# Patient Record
Sex: FEMALE | Race: BLACK OR AFRICAN AMERICAN | NOT HISPANIC OR LATINO | Employment: OTHER | URBAN - METROPOLITAN AREA
[De-identification: names, ages, dates, MRNs, and addresses within clinical notes are randomized per-mention and may not be internally consistent; named-entity substitution may affect disease eponyms.]

---

## 2019-04-03 ENCOUNTER — OFFICE VISIT (OUTPATIENT)
Dept: INTERNAL MEDICINE | Facility: CLINIC | Age: 69
End: 2019-04-03
Payer: MEDICARE

## 2019-04-03 VITALS
BODY MASS INDEX: 34.16 KG/M2 | RESPIRATION RATE: 18 BRPM | HEIGHT: 69 IN | OXYGEN SATURATION: 99 % | WEIGHT: 230.63 LBS | SYSTOLIC BLOOD PRESSURE: 162 MMHG | DIASTOLIC BLOOD PRESSURE: 100 MMHG | TEMPERATURE: 97 F | HEART RATE: 96 BPM

## 2019-04-03 DIAGNOSIS — Z12.11 ENCOUNTER FOR SCREENING COLONOSCOPY: ICD-10-CM

## 2019-04-03 DIAGNOSIS — Z78.0 POST-MENOPAUSAL: ICD-10-CM

## 2019-04-03 DIAGNOSIS — Z11.59 ENCOUNTER FOR HEPATITIS C SCREENING TEST FOR LOW RISK PATIENT: ICD-10-CM

## 2019-04-03 DIAGNOSIS — I10 ESSENTIAL HYPERTENSION, BENIGN: ICD-10-CM

## 2019-04-03 DIAGNOSIS — Z12.31 SCREENING MAMMOGRAM, ENCOUNTER FOR: ICD-10-CM

## 2019-04-03 DIAGNOSIS — I10 ESSENTIAL HYPERTENSION: ICD-10-CM

## 2019-04-03 DIAGNOSIS — Z00.00 ENCOUNTER FOR MEDICAL EXAMINATION TO ESTABLISH CARE: Primary | ICD-10-CM

## 2019-04-03 PROCEDURE — 99387 INIT PM E/M NEW PAT 65+ YRS: CPT | Mod: S$GLB,,, | Performed by: FAMILY MEDICINE

## 2019-04-03 PROCEDURE — 99999 PR PBB SHADOW E&M-EST. PATIENT-LVL IV: ICD-10-PCS | Mod: PBBFAC,,, | Performed by: FAMILY MEDICINE

## 2019-04-03 PROCEDURE — 99999 PR PBB SHADOW E&M-EST. PATIENT-LVL IV: CPT | Mod: PBBFAC,,, | Performed by: FAMILY MEDICINE

## 2019-04-03 PROCEDURE — 99387 PR PREVENTIVE VISIT,NEW,65 & OVER: ICD-10-PCS | Mod: S$GLB,,, | Performed by: FAMILY MEDICINE

## 2019-04-03 PROCEDURE — 99214 OFFICE O/P EST MOD 30 MIN: CPT | Mod: PBBFAC | Performed by: FAMILY MEDICINE

## 2019-04-03 RX ORDER — LOSARTAN POTASSIUM AND HYDROCHLOROTHIAZIDE 12.5; 5 MG/1; MG/1
1 TABLET ORAL DAILY
Qty: 90 TABLET | Refills: 1 | Status: SHIPPED | OUTPATIENT
Start: 2019-04-03 | End: 2019-04-24 | Stop reason: ALTCHOICE

## 2019-04-03 NOTE — PROGRESS NOTES
Subjective:       Patient ID: Ambar Duenas is a 68 y.o. female.    Chief Complaint: Establish Care    HPI  Ms. Duenas presents today to establish care.     Acid reflux symptoms   Tums help    Sees GYN on the 23rd     Has not taken her blood pressure medication because she has not had it  She has been walking.     Allergic reaction to pneumonia vaccination     Review of Systems   Constitutional: Negative for activity change, appetite change, fatigue and fever.   HENT: Negative for congestion, ear pain and sinus pressure.    Eyes: Negative for pain and visual disturbance.   Respiratory: Negative for cough, chest tightness and wheezing.    Cardiovascular: Negative for chest pain, palpitations and leg swelling.   Gastrointestinal: Negative for abdominal distention, abdominal pain, constipation, diarrhea, nausea and vomiting.   Endocrine: Negative for polydipsia and polyuria.   Genitourinary: Negative for difficulty urinating, dyspareunia, dysuria, flank pain and hematuria.   Musculoskeletal: Negative for arthralgias and back pain.   Skin: Negative for rash.   Neurological: Negative for dizziness, tremors, syncope, weakness, numbness and headaches.   Psychiatric/Behavioral: Negative for agitation and confusion.           Past Medical History:   Diagnosis Date    Fibroids     GERD (gastroesophageal reflux disease)     Hyperlipidemia     Hypertension      Past Surgical History:   Procedure Laterality Date    HEMORRHOID SURGERY       History reviewed. No pertinent family history.  Social History     Socioeconomic History    Marital status:      Spouse name: Not on file    Number of children: Not on file    Years of education: Not on file    Highest education level: Not on file   Occupational History    Not on file   Social Needs    Financial resource strain: Not on file    Food insecurity:     Worry: Not on file     Inability: Not on file    Transportation needs:     Medical: Not on file      Non-medical: Not on file   Tobacco Use    Smoking status: Never Smoker    Smokeless tobacco: Never Used   Substance and Sexual Activity    Alcohol use: Yes     Alcohol/week: 1.2 oz     Types: 2 Glasses of wine per week     Frequency: 2-4 times a month    Drug use: Never    Sexual activity: Not on file   Lifestyle    Physical activity:     Days per week: Not on file     Minutes per session: Not on file    Stress: Not on file   Relationships    Social connections:     Talks on phone: Not on file     Gets together: Not on file     Attends Religion service: Not on file     Active member of club or organization: Not on file     Attends meetings of clubs or organizations: Not on file     Relationship status: Not on file   Other Topics Concern    Not on file   Social History Narrative    Not on file       Objective:        Physical Exam   Constitutional: She is oriented to person, place, and time. She appears well-nourished. No distress.   HENT:   Head: Normocephalic and atraumatic.   Right Ear: External ear normal.   Left Ear: External ear normal.   Nose: Nose normal.   Mouth/Throat: Oropharynx is clear and moist.   Eyes: Pupils are equal, round, and reactive to light. EOM are normal. Right eye exhibits no discharge. Left eye exhibits no discharge.   Neck: Normal range of motion. Neck supple. No thyromegaly present.   Cardiovascular: Normal rate, regular rhythm and normal heart sounds.   No murmur heard.  Pulmonary/Chest: Effort normal and breath sounds normal. No respiratory distress. She has no wheezes.   Abdominal: Soft. Bowel sounds are normal. She exhibits no distension. There is no tenderness.   Musculoskeletal: Normal range of motion. She exhibits no edema.   Neurological: She is alert and oriented to person, place, and time. Coordination normal.   Skin: Skin is warm and dry. No rash noted.   Psychiatric: She has a normal mood and affect. Her behavior is normal.   Nursing note and vitals reviewed.         No results found for this or any previous visit.    Assessment/Plan:     Encounter for medical examination to establish care  -     Comprehensive metabolic panel; Future; Expected date: 04/03/2019  -     CBC auto differential; Future; Expected date: 04/03/2019    Encounter for hepatitis C screening test for low risk patient  -     Hepatitis C antibody; Future; Expected date: 04/03/2019    Screening mammogram, encounter for  -     Mammo Digital Screening Bilateral With CAD; Future; Expected date: 04/03/2019    Post-menopausal  -     DXA Bone Density Spine And Hip; Future; Expected date: 04/03/2019    Essential hypertension-uncontrolled   Hasn't had medication restarted today  NV in 2 weeks   Diet and exercise encouraged I.e low salt low fat diet  -     Lipid panel; Future; Expected date: 04/03/2019    Essential hypertension, benign  -     losartan-hydrochlorothiazide 50-12.5 mg (HYZAAR) 50-12.5 mg per tablet; Take 1 tablet by mouth once daily.  Dispense: 90 tablet; Refill: 1  BP goal is less than 140/90    Encounter for screening colonoscopy  -     Case request GI: COLONOSCOPY        Follow up in about 1 month (around 5/1/2019).    Gin Vitale MD  [facility]   Family Medicine

## 2019-04-23 ENCOUNTER — TELEPHONE (OUTPATIENT)
Dept: OBSTETRICS AND GYNECOLOGY | Facility: CLINIC | Age: 69
End: 2019-04-23

## 2019-04-23 NOTE — TELEPHONE ENCOUNTER
Spoke to patient and she states that she just wants to cancel her appointment that was for today 04/23/19 with Dr. West and will call back to r/s. Call ended.

## 2019-04-23 NOTE — TELEPHONE ENCOUNTER
----- Message from Karime Silva sent at 4/23/2019  2:31 PM CDT -----  Contact: pt  Please call pt @ [phone] regarding appt today, pt states she was schedule for Thursday, but sytem say today, pt need to reschedule.

## 2019-04-24 ENCOUNTER — TELEPHONE (OUTPATIENT)
Dept: INTERNAL MEDICINE | Facility: CLINIC | Age: 69
End: 2019-04-24

## 2019-04-24 ENCOUNTER — TELEPHONE (OUTPATIENT)
Dept: ENDOSCOPY | Facility: HOSPITAL | Age: 69
End: 2019-04-24

## 2019-04-24 DIAGNOSIS — I10 ESSENTIAL HYPERTENSION: Primary | ICD-10-CM

## 2019-04-24 RX ORDER — OLMESARTAN MEDOXOMIL AND HYDROCHLOROTHIAZIDE 20/12.5 20; 12.5 MG/1; MG/1
1 TABLET ORAL DAILY
Qty: 90 TABLET | Refills: 3 | Status: SHIPPED | OUTPATIENT
Start: 2019-04-24 | End: 2019-04-26

## 2019-04-24 NOTE — TELEPHONE ENCOUNTER
----- Message from Gin Vitale MD sent at 4/23/2019  2:33 PM CDT -----  Please schedule a follow up for her BP in 2 weeks if she is able to make that

## 2019-04-24 NOTE — TELEPHONE ENCOUNTER
Pt states the BP medication was making her head hurt and also it on a new list of medication for recall.  Request medication changed to benicar   Apt made for May to come back and follow up on her BP

## 2019-04-24 NOTE — TELEPHONE ENCOUNTER
Pt received [employer] call to schedule her procedure. She stated that she's currently out of town but will call back to schedule at a later date.

## 2019-04-26 ENCOUNTER — TELEPHONE (OUTPATIENT)
Dept: INTERNAL MEDICINE | Facility: CLINIC | Age: 69
End: 2019-04-26

## 2019-04-26 DIAGNOSIS — I10 ESSENTIAL HYPERTENSION: Primary | ICD-10-CM

## 2019-04-26 RX ORDER — OLMESARTAN MEDOXOMIL 40 MG/1
40 TABLET ORAL DAILY
Qty: 90 TABLET | Refills: 3 | Status: SHIPPED | OUTPATIENT
Start: 2019-04-26 | End: 2019-05-09

## 2019-04-26 NOTE — TELEPHONE ENCOUNTER
Pt states she will be out of town and will not be back until 5/20/2019  Advise pt to keep apt already scheduled with Dr. Vitale on 5/22/2019  Pt verbalized understanding

## 2019-04-26 NOTE — TELEPHONE ENCOUNTER
----- Message from Aissatou Loza sent at 4/26/2019  2:28 PM CDT -----  Contact: Patient  Patient called and stated she has an allergic reaction when she takes HCTZ; she would like to know if she can get Benicar instead. Please call her at [phone].    Thanks,  Aissatou

## 2019-04-29 RX ORDER — LOSARTAN POTASSIUM 50 MG/1
TABLET ORAL
Qty: 30 TABLET | Refills: 0 | OUTPATIENT
Start: 2019-04-29

## 2019-05-08 ENCOUNTER — PATIENT OUTREACH (OUTPATIENT)
Dept: ADMINISTRATIVE | Facility: HOSPITAL | Age: 69
End: 2019-05-08

## 2019-05-08 ENCOUNTER — TELEPHONE (OUTPATIENT)
Dept: INTERNAL MEDICINE | Facility: CLINIC | Age: 69
End: 2019-05-08

## 2019-05-08 NOTE — TELEPHONE ENCOUNTER
----- Message from Malou Palacios sent at 5/8/2019  9:39 AM CDT -----  Contact: self  Pt is calling in regards to blood pressure medication. Pt states medication was to be changed due to pharmacy not able to get pt's medication. Pt is needing blood pressure medication. Pharmacy requesting 2 other blood pressure medications for pt, but is waiting on doctor's office. Please call pt back at [phone].    Pt uses:    [facility] [place] [address] [address] [facility] [place] [address] [facility] [address]  Phone: [phone] Fax: [phone]    Thanks,   Malou Palacios

## 2019-05-08 NOTE — TELEPHONE ENCOUNTER
Pt states Benicar is on back order, requesting one of these medications sent to the pharmacy for her.  Candesartan  telmisartan

## 2019-05-09 RX ORDER — TELMISARTAN 40 MG/1
40 TABLET ORAL DAILY
Qty: 90 TABLET | Refills: 1 | Status: SHIPPED | OUTPATIENT
Start: 2019-05-09 | End: 2020-01-29 | Stop reason: ALTCHOICE

## 2019-06-03 ENCOUNTER — OFFICE VISIT (OUTPATIENT)
Dept: INTERNAL MEDICINE | Facility: CLINIC | Age: 69
End: 2019-06-03
Payer: MEDICARE

## 2019-06-03 VITALS
OXYGEN SATURATION: 98 % | WEIGHT: 224.63 LBS | HEART RATE: 90 BPM | RESPIRATION RATE: 18 BRPM | SYSTOLIC BLOOD PRESSURE: 148 MMHG | HEIGHT: 69 IN | BODY MASS INDEX: 33.27 KG/M2 | TEMPERATURE: 98 F | DIASTOLIC BLOOD PRESSURE: 102 MMHG

## 2019-06-03 DIAGNOSIS — J01.40 ACUTE PANSINUSITIS, RECURRENCE NOT SPECIFIED: ICD-10-CM

## 2019-06-03 DIAGNOSIS — Z12.11 COLON CANCER SCREENING: ICD-10-CM

## 2019-06-03 DIAGNOSIS — I10 ESSENTIAL HYPERTENSION: Primary | ICD-10-CM

## 2019-06-03 DIAGNOSIS — R09.82 ALLERGIC RHINITIS WITH POSTNASAL DRIP: ICD-10-CM

## 2019-06-03 DIAGNOSIS — J30.9 ALLERGIC RHINITIS WITH POSTNASAL DRIP: ICD-10-CM

## 2019-06-03 PROCEDURE — 3077F PR MOST RECENT SYSTOLIC BLOOD PRESSURE >= 140 MM HG: ICD-10-PCS | Mod: CPTII,S$GLB,, | Performed by: FAMILY MEDICINE

## 2019-06-03 PROCEDURE — 99214 OFFICE O/P EST MOD 30 MIN: CPT | Mod: S$GLB,,, | Performed by: FAMILY MEDICINE

## 2019-06-03 PROCEDURE — 3080F PR MOST RECENT DIASTOLIC BLOOD PRESSURE >= 90 MM HG: ICD-10-PCS | Mod: CPTII,S$GLB,, | Performed by: FAMILY MEDICINE

## 2019-06-03 PROCEDURE — 3077F SYST BP >= 140 MM HG: CPT | Mod: CPTII,S$GLB,, | Performed by: FAMILY MEDICINE

## 2019-06-03 PROCEDURE — 1101F PR PT FALLS ASSESS DOC 0-1 FALLS W/OUT INJ PAST YR: ICD-10-PCS | Mod: CPTII,S$GLB,, | Performed by: FAMILY MEDICINE

## 2019-06-03 PROCEDURE — 99214 PR OFFICE/OUTPT VISIT, EST, LEVL IV, 30-39 MIN: ICD-10-PCS | Mod: S$GLB,,, | Performed by: FAMILY MEDICINE

## 2019-06-03 PROCEDURE — 3080F DIAST BP >= 90 MM HG: CPT | Mod: CPTII,S$GLB,, | Performed by: FAMILY MEDICINE

## 2019-06-03 PROCEDURE — 99999 PR PBB SHADOW E&M-EST. PATIENT-LVL III: ICD-10-PCS | Mod: PBBFAC,,, | Performed by: FAMILY MEDICINE

## 2019-06-03 PROCEDURE — 1101F PT FALLS ASSESS-DOCD LE1/YR: CPT | Mod: CPTII,S$GLB,, | Performed by: FAMILY MEDICINE

## 2019-06-03 PROCEDURE — 99999 PR PBB SHADOW E&M-EST. PATIENT-LVL III: CPT | Mod: PBBFAC,,, | Performed by: FAMILY MEDICINE

## 2019-06-03 RX ORDER — LEVOCETIRIZINE DIHYDROCHLORIDE 5 MG/1
5 TABLET, FILM COATED ORAL NIGHTLY
Qty: 10 TABLET | Refills: 0 | Status: SHIPPED | OUTPATIENT
Start: 2019-06-03 | End: 2021-03-11 | Stop reason: SDUPTHER

## 2019-06-03 RX ORDER — AMOXICILLIN 875 MG/1
875 TABLET, FILM COATED ORAL 2 TIMES DAILY
Qty: 14 TABLET | Refills: 0 | Status: SHIPPED | OUTPATIENT
Start: 2019-06-03 | End: 2019-06-10

## 2019-06-03 NOTE — PROGRESS NOTES
Ambar Duenas  68 y.o. Black or  female    Here for follow up on hypertension.    At home she has 137/86  130's/90 yesterday  Just got blood pressure medication for about 2 weeks.     She drove from [place] today.    Went to  for headaches (2.5) weeks ago.   She was having sinus pressure   She was told she had a sinus infection.     Walk for exercise  Changing diet    Reports taking medications as instructed.  Not taking any OTC meds.    Past Medical History:   Diagnosis Date    Fibroids     GERD (gastroesophageal reflux disease)     Hyperlipidemia     Hypertension        Current Outpatient Medications:     telmisartan (MICARDIS) 40 MG Tab, Take 1 tablet (40 mg total) by mouth once daily., Disp: 90 tablet, Rfl: 1    amoxicillin (AMOXIL) 875 MG tablet, Take 1 tablet (875 mg total) by mouth 2 (two) times daily. for 7 days, Disp: 14 tablet, Rfl: 0    levocetirizine (XYZAL) 5 MG tablet, Take 1 tablet (5 mg total) by mouth every evening., Disp: 10 tablet, Rfl: 0    Review of patient's allergies indicates:  No Known Allergies    ROS: Denies dizziness, headache, chest pain, shortness of breath or edema    PE:  GEN: Alert and oriented, no acute distress  HEENT: Maxillary sinus tenderness on palpation, mucosal edema  HEART: Normal S1 and S2, RRR, no lower extremity edema  LUNGS: Respirations unlabored, bilaterally clear to auscultation    ASSESSMENT/PLAN:  Essential hypertension-uncontrolled   Informed patient of digital HTN program and she declined.   She has been exercising and does not consume extra salt.     Feels she has elevated pressures today b/c she has a sinus infection she has been dealing with.     Acute pansinusitis, recurrence not specified  -     amoxicillin (AMOXIL) 875 MG tablet; Take 1 tablet (875 mg total) by mouth 2 (two) times daily. for 7 days  Dispense: 14 tablet; Refill: 0  She did not get antibiotics when it was offered at  a week or so ago.   Treating today.      Allergic rhinitis with postnasal drip  -     levocetirizine (XYZAL) 5 MG tablet; Take 1 tablet (5 mg total) by mouth every evening.  Dispense: 10 tablet; Refill: 0    Colon cancer screening  -     Case request GI: COLONOSCOPY      Follow up 2 weeks

## 2019-06-05 ENCOUNTER — TELEPHONE (OUTPATIENT)
Dept: ENDOSCOPY | Facility: HOSPITAL | Age: 69
End: 2019-06-05

## 2019-06-05 NOTE — TELEPHONE ENCOUNTER
"Calling patient in regards to scheduling endoscopy procedure . Unable to leave voice message, "voicemail full".  "

## 2019-06-12 DIAGNOSIS — R53.83 OTHER FATIGUE: Primary | ICD-10-CM

## 2019-08-20 ENCOUNTER — TELEPHONE (OUTPATIENT)
Dept: INTERNAL MEDICINE | Facility: CLINIC | Age: 69
End: 2019-08-20

## 2019-09-05 ENCOUNTER — PATIENT OUTREACH (OUTPATIENT)
Dept: ADMINISTRATIVE | Facility: HOSPITAL | Age: 69
End: 2019-09-05

## 2019-09-05 NOTE — PROGRESS NOTES
Contacted patient to follow up on overdue Colonoscopy. Mailbox is full, unable to leave a message

## 2019-09-27 ENCOUNTER — PATIENT OUTREACH (OUTPATIENT)
Dept: ADMINISTRATIVE | Facility: HOSPITAL | Age: 69
End: 2019-09-27

## 2019-09-27 NOTE — PROGRESS NOTES
I have attempted without success to contact this patient re Mammogram. No answer, unable to lvm.

## 2019-11-01 ENCOUNTER — TELEPHONE (OUTPATIENT)
Dept: ENDOSCOPY | Facility: HOSPITAL | Age: 69
End: 2019-11-01

## 2019-12-04 ENCOUNTER — TELEPHONE (OUTPATIENT)
Dept: INTERNAL MEDICINE | Facility: CLINIC | Age: 69
End: 2019-12-04

## 2019-12-04 NOTE — TELEPHONE ENCOUNTER
----- Message from Olman Mello sent at 12/4/2019 12:56 PM CST -----  Contact: PT   States she's calling rg being 20 -25 min late and can be reached at [phone] and pt states she may show up //thanks/dbw

## 2020-01-29 ENCOUNTER — TELEPHONE (OUTPATIENT)
Dept: ENDOSCOPY | Facility: HOSPITAL | Age: 70
End: 2020-01-29

## 2020-01-29 ENCOUNTER — OFFICE VISIT (OUTPATIENT)
Dept: INTERNAL MEDICINE | Facility: CLINIC | Age: 70
End: 2020-01-29
Payer: MEDICARE

## 2020-01-29 VITALS
WEIGHT: 236.31 LBS | DIASTOLIC BLOOD PRESSURE: 96 MMHG | SYSTOLIC BLOOD PRESSURE: 158 MMHG | OXYGEN SATURATION: 99 % | TEMPERATURE: 98 F | HEART RATE: 105 BPM | BODY MASS INDEX: 34.9 KG/M2

## 2020-01-29 DIAGNOSIS — I10 ESSENTIAL HYPERTENSION: ICD-10-CM

## 2020-01-29 DIAGNOSIS — Z12.11 COLON CANCER SCREENING: ICD-10-CM

## 2020-01-29 DIAGNOSIS — Z00.00 ROUTINE PHYSICAL EXAMINATION: Primary | ICD-10-CM

## 2020-01-29 DIAGNOSIS — M62.838 MUSCLE SPASM: ICD-10-CM

## 2020-01-29 DIAGNOSIS — N95.0 POSTMENOPAUSAL BLEEDING: ICD-10-CM

## 2020-01-29 PROCEDURE — 3080F PR MOST RECENT DIASTOLIC BLOOD PRESSURE >= 90 MM HG: ICD-10-PCS | Mod: CPTII,S$GLB,, | Performed by: FAMILY MEDICINE

## 2020-01-29 PROCEDURE — 3077F PR MOST RECENT SYSTOLIC BLOOD PRESSURE >= 140 MM HG: ICD-10-PCS | Mod: CPTII,S$GLB,, | Performed by: FAMILY MEDICINE

## 2020-01-29 PROCEDURE — 3080F DIAST BP >= 90 MM HG: CPT | Mod: CPTII,S$GLB,, | Performed by: FAMILY MEDICINE

## 2020-01-29 PROCEDURE — 99999 PR PBB SHADOW E&M-EST. PATIENT-LVL III: ICD-10-PCS | Mod: PBBFAC,,, | Performed by: FAMILY MEDICINE

## 2020-01-29 PROCEDURE — 99397 PR PREVENTIVE VISIT,EST,65 & OVER: ICD-10-PCS | Mod: S$GLB,,, | Performed by: FAMILY MEDICINE

## 2020-01-29 PROCEDURE — 99397 PER PM REEVAL EST PAT 65+ YR: CPT | Mod: S$GLB,,, | Performed by: FAMILY MEDICINE

## 2020-01-29 PROCEDURE — 99999 PR PBB SHADOW E&M-EST. PATIENT-LVL III: CPT | Mod: PBBFAC,,, | Performed by: FAMILY MEDICINE

## 2020-01-29 PROCEDURE — 3077F SYST BP >= 140 MM HG: CPT | Mod: CPTII,S$GLB,, | Performed by: FAMILY MEDICINE

## 2020-01-29 RX ORDER — AMLODIPINE BESYLATE 5 MG/1
5 TABLET ORAL DAILY
Qty: 90 TABLET | Refills: 0 | Status: SHIPPED | OUTPATIENT
Start: 2020-01-29 | End: 2020-05-04 | Stop reason: SDUPTHER

## 2020-01-29 RX ORDER — METHOCARBAMOL 500 MG/1
500 TABLET, FILM COATED ORAL 4 TIMES DAILY
Qty: 40 TABLET | Refills: 0 | Status: SHIPPED | OUTPATIENT
Start: 2020-01-29 | End: 2020-02-08

## 2020-01-29 NOTE — PROGRESS NOTES
Ambar Duenas  01/30/2020  [id number]    Gin Vitale MD  Patient Care Team:  Gin Vitale MD as PCP - General (Internal Medicine)    Has the patient seen any provider outside of the network since the last visit ? (no). If yes, HIPPA forms completed and records requested.      Visit Type:a scheduled routine follow-up visit    Chief Complaint:  No chief complaint on file.      History of Present Illness:  HPI Ms. Duenas presents today for her routine annual exam. She has not had a change in her medical history since her last visit.     She has been working out and muscles are sore.   Started with a  about a month ago.     Review of Systems   Constitutional: Negative for chills and fever.   HENT: Negative for congestion and tinnitus.    Eyes: Negative for blurred vision, pain and discharge.   Respiratory: Negative for cough and wheezing.    Cardiovascular: Negative for chest pain, palpitations, orthopnea and leg swelling.   Gastrointestinal: Negative for abdominal pain, blood in stool, constipation, diarrhea, heartburn, nausea and vomiting.   Genitourinary: Negative for dysuria, flank pain, frequency, hematuria and urgency.   Musculoskeletal: Positive for joint pain and myalgias.   Skin: Negative for itching and rash.   Neurological: Negative for dizziness, tingling and headaches.   Psychiatric/Behavioral: Negative for depression.         Screening Questionnaires:    In the last two weeks how often have you felt down, depressed, or hopeless ( no )    In the last two weeks how often have you had little interest or pleasure in doing  (no )    In the last two weeks how often have you been bothered by the following problems:  1. Feeling nervous, anxious, or on edge ( no )    2. Not being able to stop or control worrying ( no)    3. Worrying too much about different things ( no)    4. Trouble relaxing ( no )    5. Being so restless that it is hard to sit still  (no )    6. Becoming easily annoyed or  irritable (no)    7. Feeling afraid as if something awful might happen (no )    How often do you have a drink containing Alcohol? denied     Do you exercise  (yes ) moderately active    Do you take a baby Aspirin daily ( no)    Do you have an advance directive ( no ) The patient was given information regarding Living Will/Durable Power-of- if requested.     The following were reviewed: Active problem list, medication list, allergies, family history, social history, and Health Maintenance.     History:  Past Medical History:   Diagnosis Date    Fibroids     GERD (gastroesophageal reflux disease)     Hyperlipidemia     Hypertension      Past Surgical History:   Procedure Laterality Date    HEMORRHOID SURGERY       History reviewed. No pertinent family history.  Social History     Socioeconomic History    Marital status:      Spouse name: Not on file    Number of children: Not on file    Years of education: Not on file    Highest education level: Not on file   Occupational History    Not on file   Social Needs    Financial resource strain: Not on file    Food insecurity:     Worry: Not on file     Inability: Not on file    Transportation needs:     Medical: Not on file     Non-medical: Not on file   Tobacco Use    Smoking status: Never Smoker    Smokeless tobacco: Never Used   Substance and Sexual Activity    Alcohol use: Yes     Alcohol/week: 2.0 standard drinks     Types: 2 Glasses of wine per week     Frequency: 2-4 times a month    Drug use: Never    Sexual activity: Not on file   Lifestyle    Physical activity:     Days per week: Not on file     Minutes per session: Not on file    Stress: Not on file   Relationships    Social connections:     Talks on phone: Not on file     Gets together: Not on file     Attends Zoroastrian service: Not on file     Active member of club or organization: Not on file     Attends meetings of clubs or organizations: Not on file     Relationship  status: Not on file   Other Topics Concern    Not on file   Social History Narrative    Not on file     There is no problem list on file for this patient.    Review of patient's allergies indicates:  No Known Allergies    Health Maintenance  The patient has no Health Maintenance topics of status Not Due  Health Maintenance Due   Topic Date Due    Hepatitis C Screening  1950    Lipid Panel  1950    TETANUS VACCINE  08/18/1970    Mammogram  08/18/1992    DEXA SCAN  08/18/1992    Shingles Vaccine (1 of 2) 08/18/2002    Colonoscopy  08/18/2002    Influenza Vaccine (1) 09/01/2019       Medications:  Current Outpatient Medications on File Prior to Visit   Medication Sig Dispense Refill    levocetirizine (XYZAL) 5 MG tablet Take 1 tablet (5 mg total) by mouth every evening. 10 tablet 0     No current facility-administered medications on file prior to visit.        Medications have been reviewed and reconciled with patient at visit today.    Barriers to medications present (no )    Adverse reactions to current medications (no)    Over the counter medications reviewed (Yes) and if needed added to active Medication list.    Exam:  Vitals:    01/29/20 1453   BP: (!) 158/96   Pulse: 105   Temp: 97.7 °F (36.5 °C)     Weight: 107.2 kg (236 lb 5.3 oz)   Body mass index is 34.9 kg/m².      Physical Exam    Laboratory Reviewed: (N/A)  No results found for: WBC, HGB, HCT, PLT, CHOL, TRIG, HDL, LDLDIRECT, ALT, AST, NA, K, CL, CREATININE, BUN, CO2, TSH, PSA, INR, GLUF, HGBA1C, MICROALBUR    Assessment:  The primary encounter diagnosis was Routine physical examination. Diagnoses of BMI 34.0-34.9,adult, Postmenopausal bleeding, Colon cancer screening, Muscle spasm, and Essential hypertension were also pertinent to this visit.    Plan:  Routine physical examination  -     CBC auto differential; Future; Expected date: 01/29/2020  Reviewed medical, social, surgical and family history. Reviewed health maintenance.      BMI 34.0-34.9,adult  -     CBC auto differential; Future; Expected date: 01/29/2020    Postmenopausal bleeding  -     Ambulatory consult to Obstetrics / Gynecology    Colon cancer screening  -     Case request GI: COLONOSCOPY    Muscle spasm  -     methocarbamol (ROBAXIN) 500 MG Tab; Take 1 tablet (500 mg total) by mouth 4 (four) times daily. for 10 days  Dispense: 40 tablet; Refill: 0    Essential hypertension-controlled  -     amLODIPine (NORVASC) 5 MG tablet; Take 1 tablet (5 mg total) by mouth once daily.  Dispense: 90 tablet; Refill: 0        -Patient's lab results were reviewed and discussed with patient  -Treatment options and alternatives were discussed with the patient. Patient expressed understanding. Patient was given the opportunity to ask questions and be an active participant in their medical care. Patient had no further questions or concerns at this time.   -Documentation of patient's health and condition was obtained from family member who was present during visit.  -Patient is an overall moderate risk for health complications from their medical conditions.     Follow up: Follow up in about 1 year (around 1/29/2021), or if symptoms worsen or fail to improve.      Care Plan/Goals: Reviewed N/A  Goals    None             After visit summary printed and given to patient upon discharge.  Patient goals and care plan are included in After visit summary.

## 2020-03-05 ENCOUNTER — PATIENT OUTREACH (OUTPATIENT)
Dept: ADMINISTRATIVE | Facility: OTHER | Age: 70
End: 2020-03-05

## 2020-05-04 DIAGNOSIS — I10 ESSENTIAL HYPERTENSION: ICD-10-CM

## 2020-05-04 RX ORDER — AMLODIPINE BESYLATE 5 MG/1
TABLET ORAL
Qty: 90 TABLET | Refills: 0 | Status: SHIPPED | OUTPATIENT
Start: 2020-05-04 | End: 2020-07-31 | Stop reason: SDUPTHER

## 2020-06-12 ENCOUNTER — PATIENT OUTREACH (OUTPATIENT)
Dept: ADMINISTRATIVE | Facility: HOSPITAL | Age: 70
End: 2020-06-12

## 2020-06-25 ENCOUNTER — PATIENT OUTREACH (OUTPATIENT)
Dept: ADMINISTRATIVE | Facility: HOSPITAL | Age: 70
End: 2020-06-25

## 2020-06-25 NOTE — PROGRESS NOTES
Contacted patient to follow up on overdue Colonoscopy. Patient states she is currently in [place] with her sister and wont be back for two weeks and will call back

## 2020-07-31 ENCOUNTER — PATIENT OUTREACH (OUTPATIENT)
Dept: ADMINISTRATIVE | Facility: HOSPITAL | Age: 70
End: 2020-07-31

## 2020-07-31 ENCOUNTER — TELEPHONE (OUTPATIENT)
Dept: ADMINISTRATIVE | Facility: HOSPITAL | Age: 70
End: 2020-07-31

## 2020-07-31 DIAGNOSIS — I10 ESSENTIAL HYPERTENSION: ICD-10-CM

## 2020-07-31 DIAGNOSIS — Z12.31 ENCOUNTER FOR SCREENING MAMMOGRAM FOR BREAST CANCER: Primary | ICD-10-CM

## 2020-07-31 RX ORDER — AMLODIPINE BESYLATE 5 MG/1
5 TABLET ORAL DAILY
Qty: 90 TABLET | Refills: 0 | Status: SHIPPED | OUTPATIENT
Start: 2020-07-31 | End: 2021-03-11 | Stop reason: SDUPTHER

## 2020-07-31 NOTE — TELEPHONE ENCOUNTER
Working Dr. Vitale Mammo overdue. Spoke with pt she will callback to schedule annual, mammo and dex scan at later time. Her sister is ill right now.

## 2020-07-31 NOTE — TELEPHONE ENCOUNTER
----- Message from Mishel Pichardo sent at 7/31/2020 10:33 AM CDT -----  Regarding: Blood Pressure Medication  Pt says she called a month ago in regards to Amlodipine refill, pt has only 4 pills left and would like a callback to discuss..[phone] (home)

## 2020-10-27 ENCOUNTER — TELEPHONE (OUTPATIENT)
Dept: INTERNAL MEDICINE | Facility: CLINIC | Age: 70
End: 2020-10-27

## 2020-10-27 NOTE — TELEPHONE ENCOUNTER
----- Message from Rachel Cantu sent at 10/27/2020  2:22 PM CDT -----  Regarding: due to weather pt needs to change appt  Type:  Needs Medical Advice    Who Called: pt  Symptoms (please be specific): na/   How long has patient had these symptoms:  n/a  Pharmacy name and phone #:  na/a  Would the patient rather a call back or a response via [facility]? Call back  Best Call Back Number: [phone]  Additional Information: Please call back in regards to appt on tomorrow.Thanks

## 2020-10-29 ENCOUNTER — TELEPHONE (OUTPATIENT)
Dept: ADMINISTRATIVE | Facility: HOSPITAL | Age: 70
End: 2020-10-29

## 2020-10-29 NOTE — TELEPHONE ENCOUNTER
Contacted patient to schedule overdue mammogram. Patient states that she will call back to schedule mammogram.

## 2020-11-10 ENCOUNTER — PATIENT OUTREACH (OUTPATIENT)
Dept: ADMINISTRATIVE | Facility: HOSPITAL | Age: 70
End: 2020-11-10

## 2020-11-10 NOTE — PROGRESS NOTES
Working HTN Report       Called pt for home BP Reading, No answer, L/M     Manda MEDINA LPN Care Coordinator  Care Coordination Department  [facility]  [phone]

## 2020-11-24 ENCOUNTER — PATIENT OUTREACH (OUTPATIENT)
Dept: ADMINISTRATIVE | Facility: OTHER | Age: 70
End: 2020-11-24

## 2020-12-04 NOTE — PROGRESS NOTES
Working mammogram report; I have contacted patient to schedule overdue mammogram.  No answer

## 2020-12-07 ENCOUNTER — TELEPHONE (OUTPATIENT)
Dept: INTERNAL MEDICINE | Facility: CLINIC | Age: 70
End: 2020-12-07

## 2020-12-07 NOTE — TELEPHONE ENCOUNTER
----- Message from Aleena Boothe sent at 12/7/2020  3:14 PM CST -----  Type:  Sooner Apoointment Request    Caller is requesting a sooner appointment.  Caller declined first available appointment listed below.  Caller will not accept being placed on the waitlist and is requesting a message be sent to doctor.  Name of Caller:pt  When is the first available appointment?12/31  Symptoms:she pulled a muscle in her thigh  Would the patient rather a call back or a response via [facility]? Call back   Best Call Back Number:[phone]  Additional Information: .    Thank you

## 2021-02-12 ENCOUNTER — TELEPHONE (OUTPATIENT)
Dept: ADMINISTRATIVE | Facility: HOSPITAL | Age: 71
End: 2021-02-12

## 2021-02-24 ENCOUNTER — TELEPHONE (OUTPATIENT)
Dept: INTERNAL MEDICINE | Facility: CLINIC | Age: 71
End: 2021-02-24

## 2021-03-02 ENCOUNTER — PATIENT OUTREACH (OUTPATIENT)
Dept: ADMINISTRATIVE | Facility: HOSPITAL | Age: 71
End: 2021-03-02

## 2021-03-11 ENCOUNTER — OFFICE VISIT (OUTPATIENT)
Dept: INTERNAL MEDICINE | Facility: CLINIC | Age: 71
End: 2021-03-11
Payer: MEDICARE

## 2021-03-11 ENCOUNTER — HOSPITAL ENCOUNTER (OUTPATIENT)
Dept: RADIOLOGY | Facility: HOSPITAL | Age: 71
Discharge: HOME | End: 2021-03-11
Attending: FAMILY MEDICINE
Payer: MEDICARE

## 2021-03-11 VITALS
OXYGEN SATURATION: 99 % | TEMPERATURE: 99 F | BODY MASS INDEX: 33.86 KG/M2 | HEART RATE: 100 BPM | WEIGHT: 228.63 LBS | DIASTOLIC BLOOD PRESSURE: 92 MMHG | HEIGHT: 69 IN | SYSTOLIC BLOOD PRESSURE: 152 MMHG

## 2021-03-11 DIAGNOSIS — M25.559 HIP PAIN: ICD-10-CM

## 2021-03-11 DIAGNOSIS — Z78.0 ASYMPTOMATIC MENOPAUSAL STATE: ICD-10-CM

## 2021-03-11 DIAGNOSIS — M62.838 MUSCLE SPASM: ICD-10-CM

## 2021-03-11 DIAGNOSIS — Z00.00 ROUTINE PHYSICAL EXAMINATION: Primary | ICD-10-CM

## 2021-03-11 DIAGNOSIS — Z12.11 COLON CANCER SCREENING: ICD-10-CM

## 2021-03-11 DIAGNOSIS — N95.0 POSTMENOPAUSAL BLEEDING: ICD-10-CM

## 2021-03-11 DIAGNOSIS — Z11.59 ENCOUNTER FOR HEPATITIS C SCREENING TEST FOR LOW RISK PATIENT: ICD-10-CM

## 2021-03-11 DIAGNOSIS — R09.82 ALLERGIC RHINITIS WITH POSTNASAL DRIP: ICD-10-CM

## 2021-03-11 DIAGNOSIS — I10 ESSENTIAL HYPERTENSION: ICD-10-CM

## 2021-03-11 DIAGNOSIS — J30.9 ALLERGIC RHINITIS WITH POSTNASAL DRIP: ICD-10-CM

## 2021-03-11 PROCEDURE — 3077F SYST BP >= 140 MM HG: CPT | Mod: CPTII,S$GLB,, | Performed by: FAMILY MEDICINE

## 2021-03-11 PROCEDURE — 3008F BODY MASS INDEX DOCD: CPT | Mod: CPTII,S$GLB,, | Performed by: FAMILY MEDICINE

## 2021-03-11 PROCEDURE — 99397 PER PM REEVAL EST PAT 65+ YR: CPT | Mod: S$GLB,,, | Performed by: FAMILY MEDICINE

## 2021-03-11 PROCEDURE — 3080F PR MOST RECENT DIASTOLIC BLOOD PRESSURE >= 90 MM HG: ICD-10-PCS | Mod: CPTII,S$GLB,, | Performed by: FAMILY MEDICINE

## 2021-03-11 PROCEDURE — 99397 PR PREVENTIVE VISIT,EST,65 & OVER: ICD-10-PCS | Mod: S$GLB,,, | Performed by: FAMILY MEDICINE

## 2021-03-11 PROCEDURE — 3077F PR MOST RECENT SYSTOLIC BLOOD PRESSURE >= 140 MM HG: ICD-10-PCS | Mod: CPTII,S$GLB,, | Performed by: FAMILY MEDICINE

## 2021-03-11 PROCEDURE — 3288F FALL RISK ASSESSMENT DOCD: CPT | Mod: CPTII,S$GLB,, | Performed by: FAMILY MEDICINE

## 2021-03-11 PROCEDURE — 99999 PR PBB SHADOW E&M-EST. PATIENT-LVL III: ICD-10-PCS | Mod: PBBFAC,,, | Performed by: FAMILY MEDICINE

## 2021-03-11 PROCEDURE — 3288F PR FALLS RISK ASSESSMENT DOCUMENTED: ICD-10-PCS | Mod: CPTII,S$GLB,, | Performed by: FAMILY MEDICINE

## 2021-03-11 PROCEDURE — 3080F DIAST BP >= 90 MM HG: CPT | Mod: CPTII,S$GLB,, | Performed by: FAMILY MEDICINE

## 2021-03-11 PROCEDURE — 1101F PT FALLS ASSESS-DOCD LE1/YR: CPT | Mod: CPTII,S$GLB,, | Performed by: FAMILY MEDICINE

## 2021-03-11 PROCEDURE — 1101F PR PT FALLS ASSESS DOC 0-1 FALLS W/OUT INJ PAST YR: ICD-10-PCS | Mod: CPTII,S$GLB,, | Performed by: FAMILY MEDICINE

## 2021-03-11 PROCEDURE — 3008F PR BODY MASS INDEX (BMI) DOCUMENTED: ICD-10-PCS | Mod: CPTII,S$GLB,, | Performed by: FAMILY MEDICINE

## 2021-03-11 PROCEDURE — 99999 PR PBB SHADOW E&M-EST. PATIENT-LVL III: CPT | Mod: PBBFAC,,, | Performed by: FAMILY MEDICINE

## 2021-03-11 RX ORDER — METHOCARBAMOL 500 MG/1
500 TABLET, FILM COATED ORAL 4 TIMES DAILY
Qty: 40 TABLET | Refills: 2 | Status: SHIPPED | OUTPATIENT
Start: 2021-03-11 | End: 2021-03-21

## 2021-03-11 RX ORDER — LEVOCETIRIZINE DIHYDROCHLORIDE 5 MG/1
5 TABLET, FILM COATED ORAL NIGHTLY
Qty: 30 TABLET | Refills: 3 | Status: SHIPPED | OUTPATIENT
Start: 2021-03-11 | End: 2021-04-28

## 2021-03-11 RX ORDER — AMLODIPINE BESYLATE 5 MG/1
5 TABLET ORAL DAILY
Qty: 90 TABLET | Refills: 3 | Status: SHIPPED | OUTPATIENT
Start: 2021-03-11 | End: 2022-06-04

## 2021-03-12 ENCOUNTER — TELEPHONE (OUTPATIENT)
Dept: ENDOSCOPY | Facility: HOSPITAL | Age: 71
End: 2021-03-12

## 2021-03-16 ENCOUNTER — PATIENT OUTREACH (OUTPATIENT)
Dept: ADMINISTRATIVE | Facility: HOSPITAL | Age: 71
End: 2021-03-16

## 2021-03-20 ENCOUNTER — IMMUNIZATION (OUTPATIENT)
Dept: INTERNAL MEDICINE | Facility: CLINIC | Age: 71
End: 2021-03-20
Payer: MEDICARE

## 2021-03-20 DIAGNOSIS — Z23 NEED FOR VACCINATION: Primary | ICD-10-CM

## 2021-03-20 PROCEDURE — 91300 COVID-19, MRNA, LNP-S, PF, 30 MCG/0.3 ML DOSE VACCINE: CPT | Mod: PBBFAC | Performed by: FAMILY MEDICINE

## 2021-03-25 ENCOUNTER — TELEPHONE (OUTPATIENT)
Dept: INTERNAL MEDICINE | Facility: CLINIC | Age: 71
End: 2021-03-25

## 2021-03-31 ENCOUNTER — TELEPHONE (OUTPATIENT)
Dept: INTERNAL MEDICINE | Facility: CLINIC | Age: 71
End: 2021-03-31

## 2021-04-10 ENCOUNTER — IMMUNIZATION (OUTPATIENT)
Dept: INTERNAL MEDICINE | Facility: CLINIC | Age: 71
End: 2021-04-10
Payer: MEDICARE

## 2021-04-10 DIAGNOSIS — Z23 NEED FOR VACCINATION: Primary | ICD-10-CM

## 2021-04-10 PROCEDURE — 91300 COVID-19, MRNA, LNP-S, PF, 30 MCG/0.3 ML DOSE VACCINE: CPT | Mod: S$GLB,,, | Performed by: FAMILY MEDICINE

## 2021-04-10 PROCEDURE — 0002A COVID-19, MRNA, LNP-S, PF, 30 MCG/0.3 ML DOSE VACCINE: CPT | Mod: CV19,S$GLB,, | Performed by: FAMILY MEDICINE

## 2021-04-10 PROCEDURE — 0002A COVID-19, MRNA, LNP-S, PF, 30 MCG/0.3 ML DOSE VACCINE: ICD-10-PCS | Mod: CV19,S$GLB,, | Performed by: FAMILY MEDICINE

## 2021-04-10 PROCEDURE — 91300 COVID-19, MRNA, LNP-S, PF, 30 MCG/0.3 ML DOSE VACCINE: ICD-10-PCS | Mod: S$GLB,,, | Performed by: FAMILY MEDICINE

## 2021-04-13 ENCOUNTER — PATIENT OUTREACH (OUTPATIENT)
Dept: ADMINISTRATIVE | Facility: OTHER | Age: 71
End: 2021-04-13

## 2021-04-14 ENCOUNTER — TELEPHONE (OUTPATIENT)
Dept: OBSTETRICS AND GYNECOLOGY | Facility: CLINIC | Age: 71
End: 2021-04-14

## 2021-04-16 ENCOUNTER — PATIENT OUTREACH (OUTPATIENT)
Dept: ADMINISTRATIVE | Facility: HOSPITAL | Age: 71
End: 2021-04-16

## 2021-04-23 ENCOUNTER — PATIENT OUTREACH (OUTPATIENT)
Dept: ADMINISTRATIVE | Facility: HOSPITAL | Age: 71
End: 2021-04-23

## 2021-04-28 ENCOUNTER — OFFICE VISIT (OUTPATIENT)
Dept: OBSTETRICS AND GYNECOLOGY | Facility: CLINIC | Age: 71
End: 2021-04-28
Payer: MEDICARE

## 2021-04-28 ENCOUNTER — CLINICAL SUPPORT (OUTPATIENT)
Dept: INTERNAL MEDICINE | Facility: CLINIC | Age: 71
End: 2021-04-28
Payer: MEDICARE

## 2021-04-28 ENCOUNTER — HOSPITAL ENCOUNTER (OUTPATIENT)
Dept: RADIOLOGY | Facility: HOSPITAL | Age: 71
Discharge: HOME | End: 2021-04-28
Attending: FAMILY MEDICINE
Payer: MEDICARE

## 2021-04-28 VITALS
WEIGHT: 227.5 LBS | BODY MASS INDEX: 33.69 KG/M2 | SYSTOLIC BLOOD PRESSURE: 138 MMHG | HEIGHT: 69 IN | DIASTOLIC BLOOD PRESSURE: 90 MMHG

## 2021-04-28 VITALS — SYSTOLIC BLOOD PRESSURE: 130 MMHG | DIASTOLIC BLOOD PRESSURE: 84 MMHG

## 2021-04-28 DIAGNOSIS — N95.0 POSTMENOPAUSAL BLEEDING: Primary | ICD-10-CM

## 2021-04-28 PROCEDURE — 1159F MED LIST DOCD IN RCRD: CPT | Mod: HCNC,S$GLB,, | Performed by: NURSE PRACTITIONER

## 2021-04-28 PROCEDURE — 99202 OFFICE O/P NEW SF 15 MIN: CPT | Mod: HCNC,S$GLB,, | Performed by: NURSE PRACTITIONER

## 2021-04-28 PROCEDURE — 1126F AMNT PAIN NOTED NONE PRSNT: CPT | Mod: HCNC,S$GLB,, | Performed by: NURSE PRACTITIONER

## 2021-04-28 PROCEDURE — 73521 X-RAY EXAM HIPS BI 2 VIEWS: CPT | Mod: TC,HCNC

## 2021-04-28 PROCEDURE — 3288F FALL RISK ASSESSMENT DOCD: CPT | Mod: HCNC,CPTII,S$GLB, | Performed by: NURSE PRACTITIONER

## 2021-04-28 PROCEDURE — 1159F PR MEDICATION LIST DOCUMENTED IN MEDICAL RECORD: ICD-10-PCS | Mod: HCNC,S$GLB,, | Performed by: NURSE PRACTITIONER

## 2021-04-28 PROCEDURE — 1126F PR PAIN SEVERITY QUANTIFIED, NO PAIN PRESENT: ICD-10-PCS | Mod: HCNC,S$GLB,, | Performed by: NURSE PRACTITIONER

## 2021-04-28 PROCEDURE — 99202 PR OFFICE/OUTPT VISIT, NEW, LEVL II, 15-29 MIN: ICD-10-PCS | Mod: HCNC,S$GLB,, | Performed by: NURSE PRACTITIONER

## 2021-04-28 PROCEDURE — 73521 X-RAY EXAM HIPS BI 2 VIEWS: CPT | Mod: 26,HCNC,, | Performed by: RADIOLOGY

## 2021-04-28 PROCEDURE — 99999 PR PBB SHADOW E&M-EST. PATIENT-LVL III: ICD-10-PCS | Mod: PBBFAC,HCNC,, | Performed by: NURSE PRACTITIONER

## 2021-04-28 PROCEDURE — 73521 XR HIPS BILATERAL 2 VIEW INCL AP PELVIS: ICD-10-PCS | Mod: 26,HCNC,, | Performed by: RADIOLOGY

## 2021-04-28 PROCEDURE — 3288F PR FALLS RISK ASSESSMENT DOCUMENTED: ICD-10-PCS | Mod: HCNC,CPTII,S$GLB, | Performed by: NURSE PRACTITIONER

## 2021-04-28 PROCEDURE — 3008F BODY MASS INDEX DOCD: CPT | Mod: HCNC,CPTII,S$GLB, | Performed by: NURSE PRACTITIONER

## 2021-04-28 PROCEDURE — 3008F PR BODY MASS INDEX (BMI) DOCUMENTED: ICD-10-PCS | Mod: HCNC,CPTII,S$GLB, | Performed by: NURSE PRACTITIONER

## 2021-04-28 PROCEDURE — 1101F PT FALLS ASSESS-DOCD LE1/YR: CPT | Mod: HCNC,CPTII,S$GLB, | Performed by: NURSE PRACTITIONER

## 2021-04-28 PROCEDURE — 1101F PR PT FALLS ASSESS DOC 0-1 FALLS W/OUT INJ PAST YR: ICD-10-PCS | Mod: HCNC,CPTII,S$GLB, | Performed by: NURSE PRACTITIONER

## 2021-04-28 PROCEDURE — 99999 PR PBB SHADOW E&M-EST. PATIENT-LVL III: CPT | Mod: PBBFAC,HCNC,, | Performed by: NURSE PRACTITIONER

## 2021-04-28 RX ORDER — CYCLOBENZAPRINE HCL 5 MG
5 TABLET ORAL 3 TIMES DAILY PRN
Status: ON HOLD | COMMUNITY
End: 2022-05-10

## 2021-05-18 ENCOUNTER — PATIENT OUTREACH (OUTPATIENT)
Dept: ADMINISTRATIVE | Facility: OTHER | Age: 71
End: 2021-05-18

## 2021-05-31 ENCOUNTER — TELEPHONE (OUTPATIENT)
Dept: ADMINISTRATIVE | Facility: HOSPITAL | Age: 71
End: 2021-05-31

## 2021-06-15 ENCOUNTER — TELEPHONE (OUTPATIENT)
Dept: ADMINISTRATIVE | Facility: HOSPITAL | Age: 71
End: 2021-06-15

## 2021-06-22 ENCOUNTER — PATIENT OUTREACH (OUTPATIENT)
Dept: ADMINISTRATIVE | Facility: OTHER | Age: 71
End: 2021-06-22

## 2021-06-23 ENCOUNTER — PATIENT OUTREACH (OUTPATIENT)
Dept: ADMINISTRATIVE | Facility: OTHER | Age: 71
End: 2021-06-23

## 2021-07-06 ENCOUNTER — PATIENT OUTREACH (OUTPATIENT)
Dept: ADMINISTRATIVE | Facility: HOSPITAL | Age: 71
End: 2021-07-06

## 2021-07-27 ENCOUNTER — PATIENT OUTREACH (OUTPATIENT)
Dept: ADMINISTRATIVE | Facility: OTHER | Age: 71
End: 2021-07-27

## 2021-08-12 ENCOUNTER — PATIENT OUTREACH (OUTPATIENT)
Dept: ADMINISTRATIVE | Facility: HOSPITAL | Age: 71
End: 2021-08-12

## 2021-10-01 ENCOUNTER — TELEPHONE (OUTPATIENT)
Dept: INTERNAL MEDICINE | Facility: CLINIC | Age: 71
End: 2021-10-01
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
## 2021-10-06 ENCOUNTER — PATIENT OUTREACH (OUTPATIENT)
Dept: ADMINISTRATIVE | Facility: OTHER | Age: 71
End: 2021-10-06

## 2021-10-06 DIAGNOSIS — Z12.31 ENCOUNTER FOR SCREENING MAMMOGRAM FOR BREAST CANCER: Primary | ICD-10-CM

## 2021-10-07 ENCOUNTER — HOSPITAL ENCOUNTER (OUTPATIENT)
Dept: RADIOLOGY | Facility: HOSPITAL | Age: 71
Discharge: HOME | End: 2021-10-07
Attending: FAMILY MEDICINE
Payer: MEDICARE
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
## 2021-10-07 PROCEDURE — 73060 X-RAY EXAM OF HUMERUS: CPT | Mod: 26,HCNC,LT, | Performed by: RADIOLOGY

## 2021-10-07 PROCEDURE — 73560 X-RAY EXAM OF KNEE 1 OR 2: CPT | Mod: 26,HCNC,RT, | Performed by: RADIOLOGY

## 2021-10-07 PROCEDURE — 73502 XR HIP WITH PELVIS WHEN PERFORMED, 2 OR 3 VIEWS LEFT: ICD-10-PCS | Mod: 26,HCNC,LT, | Performed by: RADIOLOGY

## 2021-10-07 PROCEDURE — 73502 X-RAY EXAM HIP UNI 2-3 VIEWS: CPT | Mod: TC,HCNC,LT

## 2021-10-07 PROCEDURE — 73060 XR HUMERUS 2 VIEW LEFT: ICD-10-PCS | Mod: 26,HCNC,LT, | Performed by: RADIOLOGY

## 2021-10-07 PROCEDURE — 73562 X-RAY EXAM OF KNEE 3: CPT | Mod: 26,HCNC,LT, | Performed by: RADIOLOGY

## 2021-10-07 PROCEDURE — 73502 X-RAY EXAM HIP UNI 2-3 VIEWS: CPT | Mod: 26,HCNC,LT, | Performed by: RADIOLOGY

## 2021-10-07 PROCEDURE — 73560 X-RAY EXAM OF KNEE 1 OR 2: CPT | Mod: TC,HCNC,RT

## 2021-10-07 PROCEDURE — 73562 XR KNEE ORTHO LEFT: ICD-10-PCS | Mod: 26,HCNC,LT, | Performed by: RADIOLOGY

## 2021-10-07 PROCEDURE — 73560 XR KNEE ORTHO LEFT: ICD-10-PCS | Mod: 26,HCNC,RT, | Performed by: RADIOLOGY

## 2021-10-07 PROCEDURE — 73060 X-RAY EXAM OF HUMERUS: CPT | Mod: TC,HCNC,LT

## 2021-10-08 ENCOUNTER — PATIENT MESSAGE (OUTPATIENT)
Dept: INTERNAL MEDICINE | Facility: CLINIC | Age: 71
End: 2021-10-08

## 2021-10-08 ENCOUNTER — TELEPHONE (OUTPATIENT)
Dept: ORTHOPEDICS | Facility: CLINIC | Age: 71
End: 2021-10-08

## 2021-10-08 DIAGNOSIS — M25.559 HIP PAIN: Primary | ICD-10-CM

## 2021-10-15 ENCOUNTER — TELEPHONE (OUTPATIENT)
Dept: PRIMARY CARE CLINIC | Facility: CLINIC | Age: 71
End: 2021-10-15

## 2021-10-15 RX ORDER — PANTOPRAZOLE SODIUM 40 MG/1
40 TABLET, DELAYED RELEASE ORAL DAILY
Qty: 90 TABLET | Refills: 0 | Status: SHIPPED | OUTPATIENT
Start: 2021-10-15 | End: 2022-01-18

## 2021-11-08 ENCOUNTER — TELEPHONE (OUTPATIENT)
Dept: INTERNAL MEDICINE | Facility: CLINIC | Age: 71
End: 2021-11-08
Payer: MEDICARE

## 2021-11-09 ENCOUNTER — OFFICE VISIT (OUTPATIENT)
Dept: INTERNAL MEDICINE | Facility: CLINIC | Age: 71
End: 2021-11-09
Payer: MEDICARE

## 2021-11-09 DIAGNOSIS — K21.9 GASTROESOPHAGEAL REFLUX DISEASE, UNSPECIFIED WHETHER ESOPHAGITIS PRESENT: ICD-10-CM

## 2021-11-09 DIAGNOSIS — R09.82 POST-NASAL DRIP: ICD-10-CM

## 2021-11-09 DIAGNOSIS — J30.9 ALLERGIC RHINITIS, UNSPECIFIED SEASONALITY, UNSPECIFIED TRIGGER: Primary | ICD-10-CM

## 2021-11-09 DIAGNOSIS — R05.9 COUGH: ICD-10-CM

## 2021-11-09 PROCEDURE — 1160F PR REVIEW ALL MEDS BY PRESCRIBER/CLIN PHARMACIST DOCUMENTED: ICD-10-PCS | Mod: HCNC,CPTII,95, | Performed by: PHYSICIAN ASSISTANT

## 2021-11-09 PROCEDURE — 99499 UNLISTED E&M SERVICE: CPT | Mod: 95,,, | Performed by: PHYSICIAN ASSISTANT

## 2021-11-09 PROCEDURE — 1159F MED LIST DOCD IN RCRD: CPT | Mod: HCNC,CPTII,95, | Performed by: PHYSICIAN ASSISTANT

## 2021-11-09 PROCEDURE — 1160F RVW MEDS BY RX/DR IN RCRD: CPT | Mod: HCNC,CPTII,95, | Performed by: PHYSICIAN ASSISTANT

## 2021-11-09 PROCEDURE — 99499 RISK ADDL DX/OHS AUDIT: ICD-10-PCS | Mod: 95,,, | Performed by: PHYSICIAN ASSISTANT

## 2021-11-09 PROCEDURE — 1159F PR MEDICATION LIST DOCUMENTED IN MEDICAL RECORD: ICD-10-PCS | Mod: HCNC,CPTII,95, | Performed by: PHYSICIAN ASSISTANT

## 2021-11-09 PROCEDURE — 99214 OFFICE O/P EST MOD 30 MIN: CPT | Mod: HCNC,95,, | Performed by: PHYSICIAN ASSISTANT

## 2021-11-09 PROCEDURE — 99214 PR OFFICE/OUTPT VISIT, EST, LEVL IV, 30-39 MIN: ICD-10-PCS | Mod: HCNC,95,, | Performed by: PHYSICIAN ASSISTANT

## 2021-11-09 RX ORDER — BENZONATATE 200 MG/1
200 CAPSULE ORAL 3 TIMES DAILY PRN
Qty: 30 CAPSULE | Refills: 0 | Status: SHIPPED | OUTPATIENT
Start: 2021-11-09 | End: 2021-11-19

## 2021-11-09 RX ORDER — MONTELUKAST SODIUM 10 MG/1
10 TABLET ORAL NIGHTLY
Qty: 30 TABLET | Refills: 0 | Status: SHIPPED | OUTPATIENT
Start: 2021-11-09 | End: 2021-11-12 | Stop reason: SDUPTHER

## 2021-11-09 RX ORDER — PROMETHAZINE HYDROCHLORIDE AND DEXTROMETHORPHAN HYDROBROMIDE 6.25; 15 MG/5ML; MG/5ML
5 SYRUP ORAL NIGHTLY PRN
Qty: 120 ML | Refills: 0 | Status: SHIPPED | OUTPATIENT
Start: 2021-11-09 | End: 2022-02-18

## 2021-11-12 DIAGNOSIS — J30.9 ALLERGIC RHINITIS, UNSPECIFIED SEASONALITY, UNSPECIFIED TRIGGER: ICD-10-CM

## 2021-11-12 DIAGNOSIS — R09.82 POST-NASAL DRIP: ICD-10-CM

## 2021-11-12 RX ORDER — MONTELUKAST SODIUM 10 MG/1
10 TABLET ORAL NIGHTLY
Qty: 90 TABLET | Refills: 0 | Status: SHIPPED | OUTPATIENT
Start: 2021-11-12 | End: 2021-12-12

## 2022-01-11 ENCOUNTER — IMMUNIZATION (OUTPATIENT)
Dept: PRIMARY CARE CLINIC | Facility: CLINIC | Age: 72
End: 2022-01-11
Payer: MEDICARE

## 2022-01-11 DIAGNOSIS — Z23 NEED FOR VACCINATION: Primary | ICD-10-CM

## 2022-01-11 PROCEDURE — 0004A COVID-19, MRNA, LNP-S, PF, 30 MCG/0.3 ML DOSE VACCINE: CPT | Mod: CV19,PBBFAC | Performed by: FAMILY MEDICINE

## 2022-01-18 RX ORDER — PANTOPRAZOLE SODIUM 40 MG/1
TABLET, DELAYED RELEASE ORAL
Qty: 90 TABLET | Refills: 0 | Status: SHIPPED | OUTPATIENT
Start: 2022-01-18 | End: 2022-02-25

## 2022-01-18 NOTE — TELEPHONE ENCOUNTER
Encounter details require adjustment(s)/ updating by ORC Staff  As of this time Protocols and CDM: did not populate or display   Adjustment(s) made: Department  CDM should display. Medication(s) delegated by the OR.  Will resend refill request encounter to P Centralized Refill Staff Pool.   [facility]   Note composed:9:13 AM 01/18/2022

## 2022-01-18 NOTE — TELEPHONE ENCOUNTER
Care Due:                  Date            Visit Type   Department     Provider  --------------------------------------------------------------------------------                                             [facility]  Last Visit: 03-      Tamia Vitale  Next Visit: None Scheduled  None         None Found                                                            Last  Test          Frequency    Reason                     Performed    Due Date  --------------------------------------------------------------------------------    Office Visit  12 months..  amLODIPine, pantoprazole.  03- 03-    Powered by [employer]. Reference number: [id number].   1/18/2022 9:14:46 AM CST

## 2022-01-19 ENCOUNTER — TELEPHONE (OUTPATIENT)
Dept: INTERNAL MEDICINE | Facility: CLINIC | Age: 72
End: 2022-01-19
Payer: MEDICARE

## 2022-01-19 NOTE — TELEPHONE ENCOUNTER
----- Message from Jeni Scott sent at 1/19/2022  8:35 AM CST -----  Contact: Patient [phone]  Caller is requesting an earlier appointment then we can schedule.  Caller is requesting a message be sent to the provider.  If this is for urgent care symptoms, did you offer other providers at this location, providers at other locations, or [facility]? (yes, no, n/a):  yes  If this is for the patients physical, did you offer to schedule next available and put on wait list, or to see NP or PA for their physical?  (yes, no, n/a):  yes  When is the next available appointment with their provider:  05/04/22  Reason for the appointment:  Physical   Patient preference of timeframe to be scheduled:  In February   Would the patient like a call back, or a response through their [facility] portal?: call back   Comments:

## 2022-02-18 ENCOUNTER — PATIENT MESSAGE (OUTPATIENT)
Dept: INTERNAL MEDICINE | Facility: CLINIC | Age: 72
End: 2022-02-18

## 2022-02-18 ENCOUNTER — OFFICE VISIT (OUTPATIENT)
Dept: INTERNAL MEDICINE | Facility: CLINIC | Age: 72
End: 2022-02-18
Payer: MEDICARE

## 2022-02-18 VITALS
HEIGHT: 69 IN | SYSTOLIC BLOOD PRESSURE: 122 MMHG | RESPIRATION RATE: 18 BRPM | BODY MASS INDEX: 29.98 KG/M2 | HEART RATE: 100 BPM | DIASTOLIC BLOOD PRESSURE: 76 MMHG | TEMPERATURE: 100 F | OXYGEN SATURATION: 97 % | WEIGHT: 202.38 LBS

## 2022-02-18 DIAGNOSIS — R13.10 DYSPHAGIA, UNSPECIFIED TYPE: ICD-10-CM

## 2022-02-18 DIAGNOSIS — I10 ESSENTIAL HYPERTENSION: ICD-10-CM

## 2022-02-18 DIAGNOSIS — Z12.11 COLON CANCER SCREENING: ICD-10-CM

## 2022-02-18 DIAGNOSIS — M25.641 STIFFNESS OF FINGER JOINT, RIGHT: ICD-10-CM

## 2022-02-18 DIAGNOSIS — M62.89 MUSCLE STIFFNESS: ICD-10-CM

## 2022-02-18 DIAGNOSIS — Z00.00 ANNUAL PHYSICAL EXAM: Primary | ICD-10-CM

## 2022-02-18 DIAGNOSIS — R61 EXCESSIVE SWEATING: ICD-10-CM

## 2022-02-18 PROCEDURE — 1126F PR PAIN SEVERITY QUANTIFIED, NO PAIN PRESENT: ICD-10-PCS | Mod: HCNC,CPTII,S$GLB, | Performed by: INTERNAL MEDICINE

## 2022-02-18 PROCEDURE — 1126F AMNT PAIN NOTED NONE PRSNT: CPT | Mod: HCNC,CPTII,S$GLB, | Performed by: INTERNAL MEDICINE

## 2022-02-18 PROCEDURE — 3078F DIAST BP <80 MM HG: CPT | Mod: HCNC,CPTII,S$GLB, | Performed by: INTERNAL MEDICINE

## 2022-02-18 PROCEDURE — 99999 PR PBB SHADOW E&M-EST. PATIENT-LVL IV: CPT | Mod: PBBFAC,HCNC,, | Performed by: INTERNAL MEDICINE

## 2022-02-18 PROCEDURE — 3008F BODY MASS INDEX DOCD: CPT | Mod: HCNC,CPTII,S$GLB, | Performed by: INTERNAL MEDICINE

## 2022-02-18 PROCEDURE — 1159F PR MEDICATION LIST DOCUMENTED IN MEDICAL RECORD: ICD-10-PCS | Mod: HCNC,CPTII,S$GLB, | Performed by: INTERNAL MEDICINE

## 2022-02-18 PROCEDURE — 3074F PR MOST RECENT SYSTOLIC BLOOD PRESSURE < 130 MM HG: ICD-10-PCS | Mod: HCNC,CPTII,S$GLB, | Performed by: INTERNAL MEDICINE

## 2022-02-18 PROCEDURE — 99214 OFFICE O/P EST MOD 30 MIN: CPT | Mod: HCNC,S$GLB,, | Performed by: INTERNAL MEDICINE

## 2022-02-18 PROCEDURE — 1160F RVW MEDS BY RX/DR IN RCRD: CPT | Mod: HCNC,CPTII,S$GLB, | Performed by: INTERNAL MEDICINE

## 2022-02-18 PROCEDURE — 3008F PR BODY MASS INDEX (BMI) DOCUMENTED: ICD-10-PCS | Mod: HCNC,CPTII,S$GLB, | Performed by: INTERNAL MEDICINE

## 2022-02-18 PROCEDURE — 3074F SYST BP LT 130 MM HG: CPT | Mod: HCNC,CPTII,S$GLB, | Performed by: INTERNAL MEDICINE

## 2022-02-18 PROCEDURE — 1101F PR PT FALLS ASSESS DOC 0-1 FALLS W/OUT INJ PAST YR: ICD-10-PCS | Mod: HCNC,CPTII,S$GLB, | Performed by: INTERNAL MEDICINE

## 2022-02-18 PROCEDURE — 1159F MED LIST DOCD IN RCRD: CPT | Mod: HCNC,CPTII,S$GLB, | Performed by: INTERNAL MEDICINE

## 2022-02-18 PROCEDURE — 99999 PR PBB SHADOW E&M-EST. PATIENT-LVL IV: ICD-10-PCS | Mod: PBBFAC,HCNC,, | Performed by: INTERNAL MEDICINE

## 2022-02-18 PROCEDURE — 3078F PR MOST RECENT DIASTOLIC BLOOD PRESSURE < 80 MM HG: ICD-10-PCS | Mod: HCNC,CPTII,S$GLB, | Performed by: INTERNAL MEDICINE

## 2022-02-18 PROCEDURE — 99214 PR OFFICE/OUTPT VISIT, EST, LEVL IV, 30-39 MIN: ICD-10-PCS | Mod: HCNC,S$GLB,, | Performed by: INTERNAL MEDICINE

## 2022-02-18 PROCEDURE — 1101F PT FALLS ASSESS-DOCD LE1/YR: CPT | Mod: HCNC,CPTII,S$GLB, | Performed by: INTERNAL MEDICINE

## 2022-02-18 PROCEDURE — 3288F FALL RISK ASSESSMENT DOCD: CPT | Mod: HCNC,CPTII,S$GLB, | Performed by: INTERNAL MEDICINE

## 2022-02-18 PROCEDURE — 3288F PR FALLS RISK ASSESSMENT DOCUMENTED: ICD-10-PCS | Mod: HCNC,CPTII,S$GLB, | Performed by: INTERNAL MEDICINE

## 2022-02-18 PROCEDURE — 1160F PR REVIEW ALL MEDS BY PRESCRIBER/CLIN PHARMACIST DOCUMENTED: ICD-10-PCS | Mod: HCNC,CPTII,S$GLB, | Performed by: INTERNAL MEDICINE
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
## 2022-02-21 ENCOUNTER — PATIENT MESSAGE (OUTPATIENT)
Dept: ENDOSCOPY | Facility: HOSPITAL | Age: 72
End: 2022-02-21
Payer: MEDICARE

## 2022-02-23 ENCOUNTER — PATIENT MESSAGE (OUTPATIENT)
Dept: ENDOSCOPY | Facility: HOSPITAL | Age: 72
End: 2022-02-23
Payer: MEDICARE

## 2022-02-23 RX ORDER — SODIUM, POTASSIUM,MAG SULFATES 17.5-3.13G
1 SOLUTION, RECONSTITUTED, ORAL ORAL DAILY
Qty: 1 KIT | Refills: 0 | Status: SHIPPED | OUTPATIENT
Start: 2022-02-23 | End: 2022-02-25

## 2022-02-24 ENCOUNTER — TELEPHONE (OUTPATIENT)
Dept: INTERNAL MEDICINE | Facility: CLINIC | Age: 72
End: 2022-02-24
Payer: MEDICARE

## 2022-02-24 NOTE — TELEPHONE ENCOUNTER
----- Message from Pricila Delvalle sent at 2/24/2022 12:09 PM CST -----  Pt have some questions in regard to her stomach. The medication isn't working very well for her. Please call back .[phone].

## 2022-02-24 NOTE — TELEPHONE ENCOUNTER
----- Message from Isi Lord sent at 2/24/2022 11:33 AM CST -----  Contact: Ambar Villalta needs a call back at [phone], regards to some question she has to ask the office.    Thanks  Td

## 2022-02-25 RX ORDER — PANTOPRAZOLE SODIUM 40 MG/1
40 TABLET, DELAYED RELEASE ORAL 2 TIMES DAILY
Qty: 180 TABLET | Refills: 0 | Status: SHIPPED | OUTPATIENT
Start: 2022-02-25 | End: 2022-03-04 | Stop reason: SDUPTHER

## 2022-02-25 NOTE — TELEPHONE ENCOUNTER
Patient states she having some acid reflux. Patient ask can you call something in at the pharmacy.

## 2022-02-25 NOTE — TELEPHONE ENCOUNTER
Patient was advised to take pantoprazole twice daily. Patient states she don't have much left or no refills. I advised pt I will let Dr. Gonzalez know. Patient states and verbalized understanding

## 2022-02-28 NOTE — TELEPHONE ENCOUNTER
Patient advised of new rx sent to pharmacy. Patient states she have a appt to see NP tomorrow on 03/01/2022 and to get xrays done. Pt states and verbalize understanding

## 2022-03-03 RX ORDER — MONTELUKAST SODIUM 10 MG/1
10 TABLET ORAL NIGHTLY
Qty: 30 TABLET | Refills: 3 | Status: SHIPPED | OUTPATIENT
Start: 2022-03-03 | End: 2023-05-12 | Stop reason: SDUPTHER

## 2022-03-03 RX ORDER — MONTELUKAST SODIUM 10 MG/1
10 TABLET ORAL NIGHTLY
COMMUNITY
End: 2022-03-03 | Stop reason: SDUPTHER

## 2022-03-04 ENCOUNTER — OFFICE VISIT (OUTPATIENT)
Dept: INTERNAL MEDICINE | Facility: CLINIC | Age: 72
End: 2022-03-04
Payer: MEDICARE

## 2022-03-04 VITALS
DIASTOLIC BLOOD PRESSURE: 86 MMHG | BODY MASS INDEX: 28.9 KG/M2 | OXYGEN SATURATION: 98 % | HEART RATE: 98 BPM | TEMPERATURE: 99 F | HEIGHT: 69 IN | WEIGHT: 195.13 LBS | SYSTOLIC BLOOD PRESSURE: 136 MMHG

## 2022-03-04 DIAGNOSIS — Z01.419 ENCOUNTER FOR WELL WOMAN EXAM: ICD-10-CM

## 2022-03-04 DIAGNOSIS — K21.9 GASTROESOPHAGEAL REFLUX DISEASE, UNSPECIFIED WHETHER ESOPHAGITIS PRESENT: Primary | ICD-10-CM

## 2022-03-04 DIAGNOSIS — I10 ESSENTIAL HYPERTENSION: ICD-10-CM

## 2022-03-04 PROCEDURE — 3288F PR FALLS RISK ASSESSMENT DOCUMENTED: ICD-10-PCS | Mod: CPTII,S$GLB,,

## 2022-03-04 PROCEDURE — 1160F RVW MEDS BY RX/DR IN RCRD: CPT | Mod: CPTII,S$GLB,,

## 2022-03-04 PROCEDURE — 99999 PR PBB SHADOW E&M-EST. PATIENT-LVL III: CPT | Mod: PBBFAC,,,

## 2022-03-04 PROCEDURE — 3075F PR MOST RECENT SYSTOLIC BLOOD PRESS GE 130-139MM HG: ICD-10-PCS | Mod: CPTII,S$GLB,,

## 2022-03-04 PROCEDURE — 99213 OFFICE O/P EST LOW 20 MIN: CPT | Mod: S$GLB,,,

## 2022-03-04 PROCEDURE — 1160F PR REVIEW ALL MEDS BY PRESCRIBER/CLIN PHARMACIST DOCUMENTED: ICD-10-PCS | Mod: CPTII,S$GLB,,

## 2022-03-04 PROCEDURE — 3075F SYST BP GE 130 - 139MM HG: CPT | Mod: CPTII,S$GLB,,

## 2022-03-04 PROCEDURE — 3079F DIAST BP 80-89 MM HG: CPT | Mod: CPTII,S$GLB,,

## 2022-03-04 PROCEDURE — 1101F PT FALLS ASSESS-DOCD LE1/YR: CPT | Mod: CPTII,S$GLB,,

## 2022-03-04 PROCEDURE — 1101F PR PT FALLS ASSESS DOC 0-1 FALLS W/OUT INJ PAST YR: ICD-10-PCS | Mod: CPTII,S$GLB,,

## 2022-03-04 PROCEDURE — 3079F PR MOST RECENT DIASTOLIC BLOOD PRESSURE 80-89 MM HG: ICD-10-PCS | Mod: CPTII,S$GLB,,

## 2022-03-04 PROCEDURE — 99999 PR PBB SHADOW E&M-EST. PATIENT-LVL III: ICD-10-PCS | Mod: PBBFAC,,,

## 2022-03-04 PROCEDURE — 3288F FALL RISK ASSESSMENT DOCD: CPT | Mod: CPTII,S$GLB,,

## 2022-03-04 PROCEDURE — 99213 PR OFFICE/OUTPT VISIT, EST, LEVL III, 20-29 MIN: ICD-10-PCS | Mod: S$GLB,,,

## 2022-03-04 PROCEDURE — 3008F BODY MASS INDEX DOCD: CPT | Mod: CPTII,S$GLB,,

## 2022-03-04 PROCEDURE — 1159F MED LIST DOCD IN RCRD: CPT | Mod: CPTII,S$GLB,,

## 2022-03-04 PROCEDURE — 1159F PR MEDICATION LIST DOCUMENTED IN MEDICAL RECORD: ICD-10-PCS | Mod: CPTII,S$GLB,,

## 2022-03-04 PROCEDURE — 3008F PR BODY MASS INDEX (BMI) DOCUMENTED: ICD-10-PCS | Mod: CPTII,S$GLB,,

## 2022-03-04 RX ORDER — PANTOPRAZOLE SODIUM 40 MG/1
40 TABLET, DELAYED RELEASE ORAL 2 TIMES DAILY
Qty: 180 TABLET | Refills: 0 | Status: SHIPPED | OUTPATIENT
Start: 2022-03-04 | End: 2022-03-10

## 2022-03-04 NOTE — PROGRESS NOTES
Ambar Duenas  03/04/2022  [id number]    Gin Vitale MD  Patient Care Team:  Gin Vitale MD as PCP - General (Internal Medicine)  Has the patient seen any provider outside of the [facility] network since the last visit? (no). If yes, HIPPA forms completed and records requested.        Visit Type:an urgent visit for an existing problem     Chief Complaint:  Chief Complaint   Patient presents with    Gastroesophageal Reflux       History of Present Illness:    She states that she had problems with Acid Reflux  She has not been taking her Acid Reflux medicine  She states that she does not eat fried or spicy food  She is scheduled to get an EGD at the end of the month  She started back taking her ACID Reflux med and it helped         History:  Past Medical History:   Diagnosis Date    Fibroids     GERD (gastroesophageal reflux disease)     Hyperlipidemia     Hypertension      Past Surgical History:   Procedure Laterality Date    HEMORRHOID SURGERY       No family history on file.  Social History     Socioeconomic History    Marital status:    Tobacco Use    Smoking status: Never Smoker    Smokeless tobacco: Never Used   Substance and Sexual Activity    Alcohol use: Yes     Alcohol/week: 2.0 standard drinks     Types: 2 Glasses of wine per week    Drug use: Never    Sexual activity: Not Currently     Partners: Male     Birth control/protection: Post-menopausal     There is no problem list on file for this patient.    Review of patient's allergies indicates:  No Known Allergies    The following were reviewed at this visit: active problem list, medication list, allergies, family history, social history, and health maintenance.    Medications:  Current Outpatient Medications on File Prior to Visit   Medication Sig Dispense Refill    amLODIPine (NORVASC) 5 MG tablet Take 1 tablet (5 mg total) by mouth once daily. 90 tablet 3    cyclobenzaprine (FLEXERIL) 5 MG tablet Take 5 mg by mouth 3 (three)  times daily as needed for Muscle spasms.      montelukast (SINGULAIR) 10 mg tablet Take 1 tablet (10 mg total) by mouth every evening. 30 tablet 3    pantoprazole (PROTONIX) 40 MG tablet Take 1 tablet (40 mg total) by mouth 2 (two) times daily. 180 tablet 0     No current facility-administered medications on file prior to visit.       Medications have been reviewed and reconciled with patient at this visit.  Barriers to medications reviewed with patient.    Adverse reactions to current medications reviewed with patient..    Over the counter medications reviewed and reconciled with patient.    Exam:  Wt Readings from Last 3 Encounters:   02/18/22 91.8 kg (202 lb 6.1 oz)   04/28/21 103.2 kg (227 lb 8.2 oz)   03/11/21 103.7 kg (228 lb 9.9 oz)     Temp Readings from Last 3 Encounters:   02/18/22 99.9 °F (37.7 °C) (Tympanic)   03/11/21 98.9 °F (37.2 °C) (Tympanic)   01/29/20 97.7 °F (36.5 °C)     BP Readings from Last 3 Encounters:   02/18/22 122/76   04/28/21 (!) 138/90   04/28/21 130/84     Pulse Readings from Last 3 Encounters:   02/18/22 100   03/11/21 100   01/29/20 105     There is no height or weight on file to calculate BMI.      Review of Systems   Constitutional: Negative.  Negative for chills and fever.   HENT: Positive for congestion. Negative for sinus pain and sore throat.    Eyes: Negative for blurred vision and double vision.   Respiratory: Negative for cough, sputum production, shortness of breath and wheezing.    Cardiovascular: Negative for chest pain, palpitations and leg swelling.   Gastrointestinal: Positive for heartburn. Negative for constipation, diarrhea, nausea and vomiting.   Genitourinary: Negative.    Musculoskeletal: Negative.    Skin: Negative.  Negative for rash.   Neurological: Negative.    Endo/Heme/Allergies: Negative.  Negative for polydipsia. Does not bruise/bleed easily.   Psychiatric/Behavioral: Negative for depression and substance abuse.     Physical Exam  Vitals and nursing  note reviewed.   Constitutional:       General: She is not in acute distress.     Appearance: She is well-developed. She is not diaphoretic.   HENT:      Head: Normocephalic and atraumatic.      Right Ear: External ear normal.      Left Ear: External ear normal.      Nose: Nose normal.   Eyes:      General:         Right eye: No discharge.         Left eye: No discharge.      Conjunctiva/sclera: Conjunctivae normal.      Pupils: Pupils are equal, round, and reactive to light.   Neck:      Thyroid: No thyromegaly.   Cardiovascular:      Rate and Rhythm: Normal rate and regular rhythm.      Pulses: Normal pulses.      Heart sounds: Normal heart sounds. No murmur heard.  Pulmonary:      Effort: Pulmonary effort is normal. No respiratory distress.      Breath sounds: Normal breath sounds. No wheezing.   Abdominal:      General: Bowel sounds are normal.   Musculoskeletal:         General: Normal range of motion.      Cervical back: Normal range of motion and neck supple.   Lymphadenopathy:      Cervical: No cervical adenopathy.   Skin:     General: Skin is warm and dry.      Capillary Refill: Capillary refill takes less than 2 seconds.   Neurological:      Mental Status: She is alert and oriented to person, place, and time.      Cranial Nerves: No cranial nerve deficit.   Psychiatric:         Behavior: Behavior normal.         Thought Content: Thought content normal.         Judgment: Judgment normal.         Laboratory Reviewed ({N/A)  No results found for: WBC, HGB, HCT, PLT, CHOL, TRIG, HDL, LDLDIRECT, ALT, AST, NA, K, CL, CREATININE, BUN, CO2, TSH, PSA, INR, GLUF, HGBA1C, MICROALBUR    Ambar was seen today for gastroesophageal reflux.    Diagnoses and all orders for this visit:    Gastroesophageal reflux disease, unspecified whether esophagitis present  -     pantoprazole (PROTONIX) 40 MG tablet; Take 1 tablet (40 mg total) by mouth 2 (two) times daily.    Essential hypertension   At goal during visit     She would  like to wait to get her health maintenance done at a later time       Care Plan/Goals: Reviewed    Goals    None         Follow up: No follow-ups on file.    After visit summary was printed and given to patient upon discharge today.  Patient goals and care plan are included in After Visit Summary.

## 2022-03-08 ENCOUNTER — LAB VISIT (OUTPATIENT)
Dept: LAB | Facility: HOSPITAL | Age: 72
End: 2022-03-08
Attending: FAMILY MEDICINE
Payer: MEDICARE

## 2022-03-08 ENCOUNTER — TELEPHONE (OUTPATIENT)
Dept: INTERNAL MEDICINE | Facility: CLINIC | Age: 72
End: 2022-03-08
Payer: MEDICARE

## 2022-03-08 ENCOUNTER — PATIENT OUTREACH (OUTPATIENT)
Dept: ADMINISTRATIVE | Facility: OTHER | Age: 72
End: 2022-03-08
Payer: MEDICARE

## 2022-03-08 DIAGNOSIS — I10 ESSENTIAL HYPERTENSION: ICD-10-CM

## 2022-03-08 DIAGNOSIS — Z11.59 ENCOUNTER FOR HEPATITIS C SCREENING TEST FOR LOW RISK PATIENT: ICD-10-CM

## 2022-03-08 DIAGNOSIS — Z00.00 ROUTINE PHYSICAL EXAMINATION: ICD-10-CM

## 2022-03-08 DIAGNOSIS — R61 EXCESSIVE SWEATING: ICD-10-CM

## 2022-03-08 LAB
ALBUMIN SERPL BCP-MCNC: 2.9 G/DL (ref 3.5–5.2)
ALBUMIN SERPL BCP-MCNC: 2.9 G/DL (ref 3.5–5.2)
ALP SERPL-CCNC: 56 U/L (ref 55–135)
ALP SERPL-CCNC: 56 U/L (ref 55–135)
ALT SERPL W/O P-5'-P-CCNC: 9 U/L (ref 10–44)
ALT SERPL W/O P-5'-P-CCNC: 9 U/L (ref 10–44)
ANION GAP SERPL CALC-SCNC: 11 MMOL/L (ref 8–16)
ANION GAP SERPL CALC-SCNC: 11 MMOL/L (ref 8–16)
AST SERPL-CCNC: 12 U/L (ref 10–40)
AST SERPL-CCNC: 12 U/L (ref 10–40)
BASOPHILS # BLD AUTO: 0.04 K/UL (ref 0–0.2)
BASOPHILS NFR BLD: 0.8 % (ref 0–1.9)
BILIRUB SERPL-MCNC: 0.2 MG/DL (ref 0.1–1)
BILIRUB SERPL-MCNC: 0.2 MG/DL (ref 0.1–1)
BUN SERPL-MCNC: 9 MG/DL (ref 8–23)
BUN SERPL-MCNC: 9 MG/DL (ref 8–23)
CALCIUM SERPL-MCNC: 9.3 MG/DL (ref 8.7–10.5)
CALCIUM SERPL-MCNC: 9.3 MG/DL (ref 8.7–10.5)
CHLORIDE SERPL-SCNC: 104 MMOL/L (ref 95–110)
CHLORIDE SERPL-SCNC: 104 MMOL/L (ref 95–110)
CHOLEST SERPL-MCNC: 211 MG/DL (ref 120–199)
CHOLEST/HDLC SERPL: 3.9 {RATIO} (ref 2–5)
CO2 SERPL-SCNC: 25 MMOL/L (ref 23–29)
CO2 SERPL-SCNC: 25 MMOL/L (ref 23–29)
CREAT SERPL-MCNC: 0.7 MG/DL (ref 0.5–1.4)
CREAT SERPL-MCNC: 0.7 MG/DL (ref 0.5–1.4)
DIFFERENTIAL METHOD: ABNORMAL
EOSINOPHIL # BLD AUTO: 0.1 K/UL (ref 0–0.5)
EOSINOPHIL NFR BLD: 0.9 % (ref 0–8)
ERYTHROCYTE [DISTWIDTH] IN BLOOD BY AUTOMATED COUNT: 18.6 % (ref 11.5–14.5)
EST. GFR  (AFRICAN AMERICAN): >60 ML/MIN/1.73 M^2
EST. GFR  (AFRICAN AMERICAN): >60 ML/MIN/1.73 M^2
EST. GFR  (NON AFRICAN AMERICAN): >60 ML/MIN/1.73 M^2
EST. GFR  (NON AFRICAN AMERICAN): >60 ML/MIN/1.73 M^2
GLUCOSE SERPL-MCNC: 86 MG/DL (ref 70–110)
GLUCOSE SERPL-MCNC: 86 MG/DL (ref 70–110)
HCT VFR BLD AUTO: 24.9 % (ref 37–48.5)
HDLC SERPL-MCNC: 54 MG/DL (ref 40–75)
HDLC SERPL: 25.6 % (ref 20–50)
HGB BLD-MCNC: 7 G/DL (ref 12–16)
IMM GRANULOCYTES # BLD AUTO: 0.01 K/UL (ref 0–0.04)
IMM GRANULOCYTES NFR BLD AUTO: 0.2 % (ref 0–0.5)
LDLC SERPL CALC-MCNC: 144 MG/DL (ref 63–159)
LYMPHOCYTES # BLD AUTO: 1.2 K/UL (ref 1–4.8)
LYMPHOCYTES NFR BLD: 23.2 % (ref 18–48)
MCH RBC QN AUTO: 19.6 PG (ref 27–31)
MCHC RBC AUTO-ENTMCNC: 28.1 G/DL (ref 32–36)
MCV RBC AUTO: 70 FL (ref 82–98)
MONOCYTES # BLD AUTO: 0.4 K/UL (ref 0.3–1)
MONOCYTES NFR BLD: 6.6 % (ref 4–15)
NEUTROPHILS # BLD AUTO: 3.6 K/UL (ref 1.8–7.7)
NEUTROPHILS NFR BLD: 68.3 % (ref 38–73)
NONHDLC SERPL-MCNC: 157 MG/DL
NRBC BLD-RTO: 0 /100 WBC
PLATELET # BLD AUTO: 633 K/UL (ref 150–450)
PMV BLD AUTO: 9 FL (ref 9.2–12.9)
POTASSIUM SERPL-SCNC: 3.7 MMOL/L (ref 3.5–5.1)
POTASSIUM SERPL-SCNC: 3.7 MMOL/L (ref 3.5–5.1)
PROT SERPL-MCNC: 7.3 G/DL (ref 6–8.4)
PROT SERPL-MCNC: 7.3 G/DL (ref 6–8.4)
RBC # BLD AUTO: 3.58 M/UL (ref 4–5.4)
SODIUM SERPL-SCNC: 140 MMOL/L (ref 136–145)
SODIUM SERPL-SCNC: 140 MMOL/L (ref 136–145)
TRIGL SERPL-MCNC: 65 MG/DL (ref 30–150)
TSH SERPL DL<=0.005 MIU/L-ACNC: 1.36 UIU/ML (ref 0.4–4)
WBC # BLD AUTO: 5.31 K/UL (ref 3.9–12.7)

## 2022-03-08 PROCEDURE — 84443 ASSAY THYROID STIM HORMONE: CPT | Performed by: INTERNAL MEDICINE

## 2022-03-08 PROCEDURE — 86803 HEPATITIS C AB TEST: CPT | Performed by: FAMILY MEDICINE

## 2022-03-08 PROCEDURE — 36415 COLL VENOUS BLD VENIPUNCTURE: CPT | Performed by: FAMILY MEDICINE

## 2022-03-08 PROCEDURE — 80061 LIPID PANEL: CPT | Performed by: FAMILY MEDICINE

## 2022-03-08 PROCEDURE — 80053 COMPREHEN METABOLIC PANEL: CPT | Performed by: FAMILY MEDICINE

## 2022-03-08 PROCEDURE — 85025 COMPLETE CBC W/AUTO DIFF WBC: CPT | Performed by: INTERNAL MEDICINE

## 2022-03-08 NOTE — TELEPHONE ENCOUNTER
----- Message from Lashaun Lew sent at 3/8/2022 10:39 AM CST -----  Contact: Ambar Duenas @ [phone]  Hilaria please call her in ref to the text she sent you yesterday and she never received a call or test back to her response.

## 2022-03-08 NOTE — TELEPHONE ENCOUNTER
Spoke with pt states her urine was dark- 1 time a few nights ago.   Notified if not drinking a lot of fluids urine can be dark.    other/coagulation(Bleeding disorder R/T clinical cond/anti-coags)

## 2022-03-08 NOTE — PROGRESS NOTES
Health Maintenance Due   Topic Date Due    Hepatitis C Screening  Never done    Lipid Panel  Never done    TETANUS VACCINE  Never done    Mammogram  Never done    DEXA Scan  Never done    Colorectal Cancer Screening  Never done    Shingles Vaccine (1 of 2) Never done    Pneumococcal Vaccines (Age 65+) (1 of 1 - PPSV23) Never done    Influenza Vaccine (1) Never done     Updates were requested from care everywhere.  Chart was reviewed for overdue Proactive [facility] Encounters ([facility]ON) topics (CRS, Breast Cancer Screening, Eye exam)  Health Maintenance has been updated.  LINKS immunization registry triggered.  Immunizations were reconciled.

## 2022-03-09 ENCOUNTER — OFFICE VISIT (OUTPATIENT)
Dept: OBSTETRICS AND GYNECOLOGY | Facility: CLINIC | Age: 72
End: 2022-03-09
Payer: MEDICARE

## 2022-03-09 VITALS
HEIGHT: 69 IN | BODY MASS INDEX: 29.16 KG/M2 | SYSTOLIC BLOOD PRESSURE: 128 MMHG | DIASTOLIC BLOOD PRESSURE: 78 MMHG | WEIGHT: 196.88 LBS

## 2022-03-09 DIAGNOSIS — N95.0 POSTMENOPAUSAL BLEEDING: ICD-10-CM

## 2022-03-09 DIAGNOSIS — Z01.419 ENCOUNTER FOR WELL WOMAN EXAM: ICD-10-CM

## 2022-03-09 DIAGNOSIS — N95.1 VASOMOTOR SYMPTOMS DUE TO MENOPAUSE: Primary | ICD-10-CM

## 2022-03-09 PROCEDURE — 3008F PR BODY MASS INDEX (BMI) DOCUMENTED: ICD-10-PCS | Mod: CPTII,S$GLB,, | Performed by: NURSE PRACTITIONER

## 2022-03-09 PROCEDURE — 1159F MED LIST DOCD IN RCRD: CPT | Mod: CPTII,S$GLB,, | Performed by: NURSE PRACTITIONER

## 2022-03-09 PROCEDURE — 1160F RVW MEDS BY RX/DR IN RCRD: CPT | Mod: CPTII,S$GLB,, | Performed by: NURSE PRACTITIONER

## 2022-03-09 PROCEDURE — 99999 PR PBB SHADOW E&M-EST. PATIENT-LVL III: CPT | Mod: PBBFAC,,, | Performed by: NURSE PRACTITIONER

## 2022-03-09 PROCEDURE — 3288F PR FALLS RISK ASSESSMENT DOCUMENTED: ICD-10-PCS | Mod: CPTII,S$GLB,, | Performed by: NURSE PRACTITIONER

## 2022-03-09 PROCEDURE — 1160F PR REVIEW ALL MEDS BY PRESCRIBER/CLIN PHARMACIST DOCUMENTED: ICD-10-PCS | Mod: CPTII,S$GLB,, | Performed by: NURSE PRACTITIONER

## 2022-03-09 PROCEDURE — 1126F AMNT PAIN NOTED NONE PRSNT: CPT | Mod: CPTII,S$GLB,, | Performed by: NURSE PRACTITIONER

## 2022-03-09 PROCEDURE — 1101F PR PT FALLS ASSESS DOC 0-1 FALLS W/OUT INJ PAST YR: ICD-10-PCS | Mod: CPTII,S$GLB,, | Performed by: NURSE PRACTITIONER

## 2022-03-09 PROCEDURE — 1159F PR MEDICATION LIST DOCUMENTED IN MEDICAL RECORD: ICD-10-PCS | Mod: CPTII,S$GLB,, | Performed by: NURSE PRACTITIONER

## 2022-03-09 PROCEDURE — 3078F PR MOST RECENT DIASTOLIC BLOOD PRESSURE < 80 MM HG: ICD-10-PCS | Mod: CPTII,S$GLB,, | Performed by: NURSE PRACTITIONER

## 2022-03-09 PROCEDURE — 88175 CYTOPATH C/V AUTO FLUID REDO: CPT | Performed by: NURSE PRACTITIONER

## 2022-03-09 PROCEDURE — 3008F BODY MASS INDEX DOCD: CPT | Mod: CPTII,S$GLB,, | Performed by: NURSE PRACTITIONER

## 2022-03-09 PROCEDURE — 3074F PR MOST RECENT SYSTOLIC BLOOD PRESSURE < 130 MM HG: ICD-10-PCS | Mod: CPTII,S$GLB,, | Performed by: NURSE PRACTITIONER

## 2022-03-09 PROCEDURE — G0101 CA SCREEN;PELVIC/BREAST EXAM: HCPCS | Mod: S$GLB,,, | Performed by: NURSE PRACTITIONER

## 2022-03-09 PROCEDURE — 3078F DIAST BP <80 MM HG: CPT | Mod: CPTII,S$GLB,, | Performed by: NURSE PRACTITIONER

## 2022-03-09 PROCEDURE — 87624 HPV HI-RISK TYP POOLED RSLT: CPT | Performed by: NURSE PRACTITIONER

## 2022-03-09 PROCEDURE — 1101F PT FALLS ASSESS-DOCD LE1/YR: CPT | Mod: CPTII,S$GLB,, | Performed by: NURSE PRACTITIONER

## 2022-03-09 PROCEDURE — 87625 HPV TYPES 16 & 18 ONLY: CPT | Performed by: NURSE PRACTITIONER

## 2022-03-09 PROCEDURE — 3288F FALL RISK ASSESSMENT DOCD: CPT | Mod: CPTII,S$GLB,, | Performed by: NURSE PRACTITIONER

## 2022-03-09 PROCEDURE — 1126F PR PAIN SEVERITY QUANTIFIED, NO PAIN PRESENT: ICD-10-PCS | Mod: CPTII,S$GLB,, | Performed by: NURSE PRACTITIONER

## 2022-03-09 PROCEDURE — 3074F SYST BP LT 130 MM HG: CPT | Mod: CPTII,S$GLB,, | Performed by: NURSE PRACTITIONER

## 2022-03-09 PROCEDURE — 99999 PR PBB SHADOW E&M-EST. PATIENT-LVL III: ICD-10-PCS | Mod: PBBFAC,,, | Performed by: NURSE PRACTITIONER

## 2022-03-09 PROCEDURE — G0101 PR CA SCREEN;PELVIC/BREAST EXAM: ICD-10-PCS | Mod: S$GLB,,, | Performed by: NURSE PRACTITIONER

## 2022-03-09 RX ORDER — CONJUGATED ESTROGENS AND MEDROXYPROGESTERONE ACETATE .625; 2.5 MG/1; MG/1
1 TABLET, SUGAR COATED ORAL DAILY
Qty: 30 TABLET | Refills: 2 | Status: SHIPPED | OUTPATIENT
Start: 2022-03-09 | End: 2022-03-16

## 2022-03-09 RX ORDER — SODIUM, POTASSIUM,MAG SULFATES 17.5-3.13G
SOLUTION, RECONSTITUTED, ORAL ORAL
Status: ON HOLD | COMMUNITY
Start: 2022-03-06 | End: 2022-03-28 | Stop reason: ALTCHOICE

## 2022-03-09 NOTE — PROGRESS NOTES
Chief Complaint   Patient presents with    Well Woman        Ambar Duenas is a 71 y.o. female    Complains of hot flashes. Desires MHT  She has been seen in the past for  bleeding 2021, work up was not complete due to client did not come back to complete visit. States that since that time she has seen spotting on tissue after urination from time to time (was not able to be more specific). She has been Postmenopausal for less than 10 years (verbal history)       Past Medical History:   Diagnosis Date    Fibroids     GERD (gastroesophageal reflux disease)     Hyperlipidemia     Hypertension      Past Surgical History:   Procedure Laterality Date    HEMORRHOID SURGERY       Social History     Tobacco Use    Smoking status: Never Smoker    Smokeless tobacco: Never Used   Substance Use Topics    Alcohol use: Yes     Alcohol/week: 2.0 standard drinks     Types: 2 Glasses of wine per week     Comment: occ    Drug use: Never     History reviewed. No pertinent family history.  OB History    Para Term  AB Living   1         1   SAB IAB Ectopic Multiple Live Births                  # Outcome Date GA Lbr Jordan/2nd Weight Sex Delivery Anes PTL Lv   1                 Medication List with Changes/Refills   New Medications    ESTROGEN, CONJUGATED,-MEDROXYPROGESTERONE 0.625-2.5MG (PREMPRO) 0.625-2.5 MG PER TABLET    Take 1 tablet by mouth once daily.   Current Medications    AMLODIPINE (NORVASC) 5 MG TABLET    Take 1 tablet (5 mg total) by mouth once daily.    CYCLOBENZAPRINE (FLEXERIL) 5 MG TABLET    Take 5 mg by mouth 3 (three) times daily as needed for Muscle spasms.    MONTELUKAST (SINGULAIR) 10 MG TABLET    Take 1 tablet (10 mg total) by mouth every evening.    SUPREP BOWEL PREP KIT 17.5-3.13-1.6 GRAM SOLR    Take by mouth.   Changed and/or Refilled Medications    Modified Medication Previous Medication    PANTOPRAZOLE (PROTONIX) 40 MG TABLET pantoprazole (PROTONIX) 40 MG tablet  "      TAKE 1 TABLET(40 MG) BY MOUTH TWICE DAILY    Take 1 tablet (40 mg total) by mouth 2 (two) times daily.      /78   Ht 5' 9" (1.753 m)   Wt 89.3 kg (196 lb 13.9 oz)   LMP 06/03/2019   BMI 29.07 kg/m²     ROS:  Review of Systems      PHYSICAL EXAM:  Physical Exam  Genitourinary:      Vulva exam comments: Normal .      Vaginal exam comments: Normal .      Adnexa exam comments: Normal .      Cervical exam comments: Unable to visualize cervix  Upon palpation cervix felt flat, cervical os palpated but no ectocervix.      Uterus exam comments: Normal .         Blind pap collected she is 71 years old. Her last pap smear ws 5 years ago and she has never had an abnormal pap smear.   ASSESSMENT and PLAN:    ICD-10-CM ICD-9-CM    1. Encounter for well woman exam  Z01.419 V72.31 Ambulatory referral/consult to Obstetrics / Gynecology   2. Vasomotor symptoms due to menopause  N95.1 627.2 estrogen, conjugated,-medroxyprogesterone 0.625-2.5mg (PREMPRO) 0.625-2.5 mg per tablet   3. Postmenopausal bleeding  N95.0 627.1 US Pelvis Complete Non OB      CANCELED: Liquid-Based Pap Smear, Screening      CANCELED: HPV High Risk Genotypes, PCR   CBC and TSH collected at PCP     Josefina Hatfield NP   "

## 2022-03-10 ENCOUNTER — PATIENT MESSAGE (OUTPATIENT)
Dept: INTERNAL MEDICINE | Facility: CLINIC | Age: 72
End: 2022-03-10
Payer: MEDICARE

## 2022-03-10 LAB — HCV AB SERPL QL IA: NEGATIVE

## 2022-03-15 ENCOUNTER — PATIENT MESSAGE (OUTPATIENT)
Dept: INTERNAL MEDICINE | Facility: CLINIC | Age: 72
End: 2022-03-15
Payer: MEDICARE

## 2022-03-15 ENCOUNTER — HOSPITAL ENCOUNTER (OUTPATIENT)
Dept: RADIOLOGY | Facility: HOSPITAL | Age: 72
Discharge: HOME | End: 2022-03-15
Attending: NURSE PRACTITIONER
Payer: MEDICARE

## 2022-03-15 DIAGNOSIS — N95.0 POSTMENOPAUSAL BLEEDING: ICD-10-CM

## 2022-03-15 PROCEDURE — 76856 US PELVIS COMP WITH TRANSVAG NON-OB (XPD): ICD-10-PCS | Mod: 26,,, | Performed by: RADIOLOGY

## 2022-03-15 PROCEDURE — 76830 TRANSVAGINAL US NON-OB: CPT | Mod: 26,,, | Performed by: RADIOLOGY

## 2022-03-15 PROCEDURE — 76856 US EXAM PELVIC COMPLETE: CPT | Mod: 26,,, | Performed by: RADIOLOGY

## 2022-03-15 PROCEDURE — 76830 US PELVIS COMP WITH TRANSVAG NON-OB (XPD): ICD-10-PCS | Mod: 26,,, | Performed by: RADIOLOGY

## 2022-03-15 PROCEDURE — 76830 TRANSVAGINAL US NON-OB: CPT | Mod: TC

## 2022-03-16 ENCOUNTER — TELEPHONE (OUTPATIENT)
Dept: OBSTETRICS AND GYNECOLOGY | Facility: CLINIC | Age: 72
End: 2022-03-16
Payer: MEDICARE

## 2022-03-16 LAB
CLINICAL INFO: NORMAL
CYTO CVX: NORMAL
CYTOLOGIST CVX/VAG CYTO: NORMAL
CYTOLOGIST CVX/VAG CYTO: NORMAL
CYTOLOGY CMNT CVX/VAG CYTO-IMP: NORMAL
CYTOLOGY PAP THIN PREP EXPLANATION: NORMAL
DATE OF PREVIOUS PAP: NORMAL
DATE PREVIOUS BX: NO
GEN CATEG CVX/VAG CYTO-IMP: NORMAL
HPV I/H RISK 4 DNA CVX QL NAA+PROBE: DETECTED
HPV16 DNA CVX QL PROBE+SIG AMP: NOT DETECTED
HPV18 DNA CVX QL PROBE+SIG AMP: NOT DETECTED
LMP START DATE: NORMAL
MICROORGANISM CVX/VAG CYTO: NORMAL
PATHOLOGIST CVX/VAG CYTO: NORMAL
SERVICE CMNT-IMP: NORMAL
SPECIMEN SOURCE CVX/VAG CYTO: NORMAL
STAT OF ADQ CVX/VAG CYTO-IMP: NORMAL

## 2022-03-16 NOTE — TELEPHONE ENCOUNTER
----- Message from Minerva Palacios sent at 3/16/2022  3:38 PM CDT -----  Contact: self  She was speaking with someone there about her pap smear results and was disconnected. Please call back at [phone]

## 2022-03-22 ENCOUNTER — PATIENT MESSAGE (OUTPATIENT)
Dept: ENDOSCOPY | Facility: HOSPITAL | Age: 72
End: 2022-03-22
Payer: MEDICARE

## 2022-03-28 ENCOUNTER — HOSPITAL ENCOUNTER (OUTPATIENT)
Facility: HOSPITAL | Age: 72
Discharge: HOME | End: 2022-03-28
Attending: INTERNAL MEDICINE | Admitting: INTERNAL MEDICINE
Payer: MEDICARE

## 2022-03-28 ENCOUNTER — ANESTHESIA (OUTPATIENT)
Dept: ENDOSCOPY | Facility: HOSPITAL | Age: 72
End: 2022-03-28
Payer: MEDICARE

## 2022-03-28 ENCOUNTER — ANESTHESIA EVENT (OUTPATIENT)
Dept: ENDOSCOPY | Facility: HOSPITAL | Age: 72
End: 2022-03-28
Payer: MEDICARE

## 2022-03-28 VITALS
SYSTOLIC BLOOD PRESSURE: 135 MMHG | TEMPERATURE: 98 F | BODY MASS INDEX: 27.4 KG/M2 | HEART RATE: 83 BPM | DIASTOLIC BLOOD PRESSURE: 74 MMHG | HEIGHT: 69 IN | OXYGEN SATURATION: 100 % | WEIGHT: 185 LBS | RESPIRATION RATE: 15 BRPM

## 2022-03-28 DIAGNOSIS — K63.5 POLYP OF COLON, UNSPECIFIED PART OF COLON, UNSPECIFIED TYPE: Primary | ICD-10-CM

## 2022-03-28 DIAGNOSIS — Z12.11 COLON CANCER SCREENING: ICD-10-CM

## 2022-03-28 PROCEDURE — 45380 COLONOSCOPY AND BIOPSY: CPT | Performed by: INTERNAL MEDICINE

## 2022-03-28 PROCEDURE — 37000009 HC ANESTHESIA EA ADD 15 MINS: Performed by: INTERNAL MEDICINE

## 2022-03-28 PROCEDURE — 88341 PR IHC OR ICC EACH ADD'L SINGLE ANTIBODY  STAINPR: ICD-10-PCS | Mod: 26,,, | Performed by: PATHOLOGY

## 2022-03-28 PROCEDURE — 43239 EGD BIOPSY SINGLE/MULTIPLE: CPT | Performed by: INTERNAL MEDICINE

## 2022-03-28 PROCEDURE — 88305 TISSUE EXAM BY PATHOLOGIST: ICD-10-PCS | Mod: 26,,, | Performed by: PATHOLOGY

## 2022-03-28 PROCEDURE — 88342 IMHCHEM/IMCYTCHM 1ST ANTB: CPT | Performed by: PATHOLOGY

## 2022-03-28 PROCEDURE — 88312 PR  SPECIAL STAINS,GROUP I: ICD-10-PCS | Mod: 26,,, | Performed by: PATHOLOGY

## 2022-03-28 PROCEDURE — 45380 PR COLONOSCOPY,BIOPSY: ICD-10-PCS | Mod: 52,PT,, | Performed by: INTERNAL MEDICINE

## 2022-03-28 PROCEDURE — 37000008 HC ANESTHESIA 1ST 15 MINUTES: Performed by: INTERNAL MEDICINE

## 2022-03-28 PROCEDURE — 27201012 HC FORCEPS, HOT/COLD, DISP: Performed by: INTERNAL MEDICINE

## 2022-03-28 PROCEDURE — 88342 IMHCHEM/IMCYTCHM 1ST ANTB: CPT | Mod: 26,,, | Performed by: PATHOLOGY

## 2022-03-28 PROCEDURE — 88305 TISSUE EXAM BY PATHOLOGIST: CPT | Mod: 26,,, | Performed by: PATHOLOGY

## 2022-03-28 PROCEDURE — 45380 COLONOSCOPY AND BIOPSY: CPT | Mod: 52,PT,, | Performed by: INTERNAL MEDICINE

## 2022-03-28 PROCEDURE — 88312 SPECIAL STAINS GROUP 1: CPT | Mod: 26,,, | Performed by: PATHOLOGY

## 2022-03-28 PROCEDURE — 63600175 PHARM REV CODE 636 W HCPCS: Performed by: NURSE ANESTHETIST, CERTIFIED REGISTERED

## 2022-03-28 PROCEDURE — 88341 IMHCHEM/IMCYTCHM EA ADD ANTB: CPT | Mod: 26,,, | Performed by: PATHOLOGY

## 2022-03-28 PROCEDURE — 43239 EGD BIOPSY SINGLE/MULTIPLE: CPT | Mod: ,,, | Performed by: INTERNAL MEDICINE

## 2022-03-28 PROCEDURE — 88312 SPECIAL STAINS GROUP 1: CPT | Performed by: PATHOLOGY

## 2022-03-28 PROCEDURE — 88342 CHG IMMUNOCYTOCHEMISTRY: ICD-10-PCS | Mod: 26,,, | Performed by: PATHOLOGY

## 2022-03-28 PROCEDURE — 88305 TISSUE EXAM BY PATHOLOGIST: CPT | Performed by: PATHOLOGY

## 2022-03-28 PROCEDURE — 88341 IMHCHEM/IMCYTCHM EA ADD ANTB: CPT | Performed by: PATHOLOGY

## 2022-03-28 PROCEDURE — 43239 PR EGD, FLEX, W/BIOPSY, SGL/MULTI: ICD-10-PCS | Mod: ,,, | Performed by: INTERNAL MEDICINE

## 2022-03-28 RX ORDER — SODIUM CHLORIDE, SODIUM LACTATE, POTASSIUM CHLORIDE, CALCIUM CHLORIDE 600; 310; 30; 20 MG/100ML; MG/100ML; MG/100ML; MG/100ML
INJECTION, SOLUTION INTRAVENOUS CONTINUOUS PRN
Status: DISCONTINUED | OUTPATIENT
Start: 2022-03-28 | End: 2022-03-28

## 2022-03-28 RX ORDER — PROPOFOL 10 MG/ML
VIAL (ML) INTRAVENOUS
Status: DISCONTINUED | OUTPATIENT
Start: 2022-03-28 | End: 2022-03-28

## 2022-03-28 RX ADMIN — SODIUM CHLORIDE, SODIUM LACTATE, POTASSIUM CHLORIDE, AND CALCIUM CHLORIDE: 600; 310; 30; 20 INJECTION, SOLUTION INTRAVENOUS at 12:03

## 2022-03-28 RX ADMIN — PROPOFOL 100 MG: 10 INJECTION, EMULSION INTRAVENOUS at 12:03

## 2022-03-28 RX ADMIN — PROPOFOL 50 MG: 10 INJECTION, EMULSION INTRAVENOUS at 12:03

## 2022-03-28 NOTE — H&P
PRE PROCEDURE H&P    Patient Name: Ambar Duenas  MRN: [id number]  : 1950  Date of Procedure:  3/28/2022  Referring Physician: Lin Gonzalez DO  Primary Physician: iGn Vitale MD  Procedure Physician: Melanie Ballard MD       Planned Procedure: Colonoscopy and EGD  Diagnosis: dysphagia, GERD, and colon cancer screening   Chief Complaint: Same as above    HPI: Patient is an 71 y.o. female is here for the above.     Last colonoscopy: 12 years ago   Family history: None  Anticoagulation: None     Past Medical History:   Past Medical History:   Diagnosis Date    Anemia, unspecified     Fibroids     GERD (gastroesophageal reflux disease)     Hyperlipidemia     Hypertension         Past Surgical History:  Past Surgical History:   Procedure Laterality Date    HEMORRHOID SURGERY          Home Medications:  Prior to Admission medications    Medication Sig Start Date End Date Taking? Authorizing Provider   amLODIPine (NORVASC) 5 MG tablet Take 1 tablet (5 mg total) by mouth once daily. 3/11/21  Yes Gin Vitale MD   montelukast (SINGULAIR) 10 mg tablet Take 1 tablet (10 mg total) by mouth every evening. 3/3/22  Yes Marina Corea PA-C   pantoprazole (PROTONIX) 40 MG tablet TAKE 1 TABLET(40 MG) BY MOUTH TWICE DAILY 3/10/22  Yes Julia Gonzalez NP   cyclobenzaprine (FLEXERIL) 5 MG tablet Take 5 mg by mouth 3 (three) times daily as needed for Muscle spasms.    Historical Provider   SUPREP BOWEL PREP KIT 17.5-3.13-1.6 gram SolR Take by mouth. 3/6/22 3/28/22  Historical Provider        Allergies:  Review of patient's allergies indicates:  No Known Allergies     Social History:   Social History     Socioeconomic History    Marital status:    Tobacco Use    Smoking status: Never Smoker    Smokeless tobacco: Never Used   Substance and Sexual Activity    Alcohol use: Yes     Alcohol/week: 2.0 standard drinks     Types: 2 Glasses of wine per week     Comment: occ    Drug use: Never     "Sexual activity: Not Currently     Partners: Male     Birth control/protection: Post-menopausal       Family History:  History reviewed. No pertinent family history.    ROS: No acute cardiac events, no acute respiratory complaints.     Physical Exam (all patients):    /65 (BP Location: Left arm, Patient Position: Sitting)   Pulse 105   Temp 98.1 °F (36.7 °C) (Temporal)   Resp 19   Ht 5' 9" (1.753 m)   Wt 83.9 kg (185 lb)   LMP 06/03/2019   SpO2 100%   Breastfeeding No   BMI 27.32 kg/m²   Lungs: Clear to auscultation bilaterally, respirations unlabored  Heart: Regular rate and rhythm, S1 and S2 normal, no obvious murmurs  Abdomen:         Soft, non-tender, bowel sounds normal, no masses, no organomegaly    Lab Results   Component Value Date    WBC 5.31 03/08/2022    MCV 70 (L) 03/08/2022    RDW 18.6 (H) 03/08/2022     (H) 03/08/2022    GLU 86 03/08/2022    GLU 86 03/08/2022    BUN 9 03/08/2022    BUN 9 03/08/2022     03/08/2022     03/08/2022    K 3.7 03/08/2022    K 3.7 03/08/2022     03/08/2022     03/08/2022        SEDATION PLAN: per anesthesia      History reviewed, vital signs satisfactory, cardiopulmonary status satisfactory, sedation options, risks and plans have been discussed with the patient  All their questions were answered and the patient agrees to the sedation procedures as planned and the patient is deemed an appropriate candidate for the sedation as planned.    Procedure explained to patient, informed consent obtained and placed in chart.    Melanie Ballard  3/28/2022  12:27 PM     "

## 2022-03-28 NOTE — ANESTHESIA POSTPROCEDURE EVALUATION
Anesthesia Post Evaluation    Patient: Ambar Duenas    Procedure(s) Performed: Procedure(s) (LRB):  ESOPHAGOGASTRODUODENOSCOPY (EGD) (N/A)  COLONOSCOPY (N/A)    Final Anesthesia Type: MAC      Patient location during evaluation: PACU  Patient participation: Yes- Able to Participate  Level of consciousness: awake and alert, oriented and awake  Post-procedure vital signs: reviewed and stable  Pain management: adequate  Airway patency: patent    PONV status at discharge: No PONV  Anesthetic complications: no      Cardiovascular status: blood pressure returned to baseline  Respiratory status: unassisted, spontaneous ventilation and room air  Hydration status: euvolemic  Follow-up not needed.          Vitals Value Taken Time   /65 03/28/22 1209   Temp 36.7 °C (98.1 °F) 03/28/22 1154   Pulse 105 03/28/22 1209   Resp 19 03/28/22 1209   SpO2 100 % 03/28/22 1209         No case tracking events are documented in the log.      Pain/Terrence Score: No data recorded

## 2022-03-28 NOTE — ANESTHESIA PREPROCEDURE EVALUATION
03/28/2022  Ambar Duenas is a 71 y.o., female.      Pre-op Assessment    I have reviewed the Patient Summary Reports.     I have reviewed the Nursing Notes.       Review of Systems  Anesthesia Hx:  No problems with previous Anesthesia    EENT/Dental:EENT/Dental Normal   Cardiovascular:   Exercise tolerance: good Hypertension    Pulmonary:  Pulmonary Normal    Hepatic/GI:   GERD    Neurological:  Neurology Normal        Physical Exam  General: Well nourished    Airway:  Mallampati: II   Mouth Opening: Normal  TM Distance: Normal  Neck ROM: Normal ROM    Dental:  Intact        Anesthesia Plan  Type of Anesthesia, risks & benefits discussed:    Anesthesia Type: MAC  Intra-op Monitoring Plan: Standard ASA Monitors  Post Op Pain Control Plan: IV/PO Opioids PRN  Induction:  IV  Informed Consent: Informed consent signed with the Patient and all parties understand the risks and agree with anesthesia plan.  All questions answered. Patient consented to blood products? Yes  ASA Score: 2  Day of Surgery Review of History & Physical: H&P Update referred to the surgeon/provider.I have interviewed and examined the patient. I have reviewed the patient's H&P dated: There are no significant changes.     Ready For Surgery From Anesthesia Perspective.     .

## 2022-03-28 NOTE — PROVATION PATIENT INSTRUCTIONS
Discharge Summary/Instructions after an Endoscopic Procedure  Patient Name: Ambar Duenas  Patient MRN: [id number]  Patient YOB: 1950 Monday, March 28, 2022 Melanie Ballard MD  Dear patient,  As a result of recent federal legislation (The Federal Cures Act), you may   receive lab or pathology results from your procedure in your [facility]   account before your physician is able to contact you. Your physician or   their representative will relay the results to you with their   recommendations at their soonest availability.  Thank you,  RESTRICTIONS:  During your procedure today, you received medications for sedation.  These   medications may affect your judgment, balance and coordination.  Therefore,   for 24 hours, you have the following restrictions:   - DO NOT drive a car, operate machinery, make legal/financial decisions,   sign important papers or drink alcohol.    ACTIVITY:  Today: no heavy lifting, straining or running due to procedural   sedation/anesthesia.  The following day: return to full activity including work.  DIET:  Eat and drink normally unless instructed otherwise.     TREATMENT FOR COMMON SIDE EFFECTS:  - Mild abdominal pain, nausea, belching, bloating or excessive gas:  rest,   eat lightly and use a heating pad.  - Sore Throat: treat with throat lozenges and/or gargle with warm salt   water.  - Because air was used during the procedure, expelling large amounts of air   from your rectum or belching is normal.  - If a bowel prep was taken, you may not have a bowel movement for 1-3 days.    This is normal.  SYMPTOMS TO WATCH FOR AND REPORT TO YOUR PHYSICIAN:  1. Abdominal pain or bloating, other than gas cramps.  2. Chest pain.  3. Back pain.  4. Signs of infection such as: chills or fever occurring within 24 hours   after the procedure.  5. Rectal bleeding, which would show as bright red, maroon, or black stools.   (A tablespoon of blood from the rectum is not serious, especially  if   hemorrhoids are present.)  6. Vomiting.  7. Weakness or dizziness.  GO DIRECTLY TO THE NEAREST EMERGENCY ROOM IF YOU HAVE ANY OF THE FOLLOWING:      Difficulty breathing              Chills and/or fever over 101 F   Persistent vomiting and/or vomiting blood   Severe abdominal pain   Severe chest pain   Black, tarry stools   Bleeding- more than one tablespoon   Any other symptom or condition that you feel may need urgent attention  Your doctor recommends these additional instructions:  If any biopsies were taken, your doctors clinic will contact you in 1 to 2   weeks with any results.  - Discharge patient to home.   - Resume previous diet.   - Continue present medications.   - Await pathology results.   - Repeat colonoscopy for surveillance based on pathology results.   -CT chest, abdomen and pelvis will be ordered.  - Patient has a contact number available for emergencies.  The signs and   symptoms of potential delayed complications were discussed with the   patient.  Return to normal activities tomorrow.  Written discharge   instructions were provided to the patient.  For questions, problems or results please call your physician Melanie Ballrad MD at Work:  [phone]  If you have any questions about the above instructions, call the GI   department at [phone] or call the endoscopy unit at [phone]   from 7am until 3 pm.  [facility] [place], EMERGENCY ROOM PHONE NUMBER:   [phone]  IF A COMPLICATION OR EMERGENCY SITUATION ARISES AND YOU ARE UNABLE TO REACH   YOUR PHYSICIAN - GO DIRECTLY TO THE EMERGENCY ROOM.  I have read or have had read to me these discharge instructions for my   procedure and have received a written copy.  I understand these   instructions and will follow-up with my physician if I have any questions.     __________________________________       _____________________________________  Nurse Signature                                           Patient/Designated   Responsible Party Signature  Melanie Ballard MD  3/28/2022 12:54:55 PM  This report has been verified and signed electronically.  Dear patient,  As a result of recent federal legislation (The Federal Cures Act), you may   receive lab or pathology results from your procedure in your [facility]   account before your physician is able to contact you. Your physician or   their representative will relay the results to you with their   recommendations at their soonest availability.  Thank you,  PROVATION

## 2022-03-28 NOTE — PROVATION PATIENT INSTRUCTIONS
Discharge Summary/Instructions after an Endoscopic Procedure  Patient Name: Ambar Duenas  Patient MRN: [id number]  Patient YOB: 1950 Monday, March 28, 2022 Melanie Ballard MD  Dear patient,  As a result of recent federal legislation (The Federal Cures Act), you may   receive lab or pathology results from your procedure in your [facility]   account before your physician is able to contact you. Your physician or   their representative will relay the results to you with their   recommendations at their soonest availability.  Thank you,  RESTRICTIONS:  During your procedure today, you received medications for sedation.  These   medications may affect your judgment, balance and coordination.  Therefore,   for 24 hours, you have the following restrictions:   - DO NOT drive a car, operate machinery, make legal/financial decisions,   sign important papers or drink alcohol.    ACTIVITY:  Today: no heavy lifting, straining or running due to procedural   sedation/anesthesia.  The following day: return to full activity including work.  DIET:  Eat and drink normally unless instructed otherwise.     TREATMENT FOR COMMON SIDE EFFECTS:  - Mild abdominal pain, nausea, belching, bloating or excessive gas:  rest,   eat lightly and use a heating pad.  - Sore Throat: treat with throat lozenges and/or gargle with warm salt   water.  - Because air was used during the procedure, expelling large amounts of air   from your rectum or belching is normal.  - If a bowel prep was taken, you may not have a bowel movement for 1-3 days.    This is normal.  SYMPTOMS TO WATCH FOR AND REPORT TO YOUR PHYSICIAN:  1. Abdominal pain or bloating, other than gas cramps.  2. Chest pain.  3. Back pain.  4. Signs of infection such as: chills or fever occurring within 24 hours   after the procedure.  5. Rectal bleeding, which would show as bright red, maroon, or black stools.   (A tablespoon of blood from the rectum is not serious, especially  if   hemorrhoids are present.)  6. Vomiting.  7. Weakness or dizziness.  GO DIRECTLY TO THE NEAREST EMERGENCY ROOM IF YOU HAVE ANY OF THE FOLLOWING:      Difficulty breathing              Chills and/or fever over 101 F   Persistent vomiting and/or vomiting blood   Severe abdominal pain   Severe chest pain   Black, tarry stools   Bleeding- more than one tablespoon   Any other symptom or condition that you feel may need urgent attention  Your doctor recommends these additional instructions:  If any biopsies were taken, your doctors clinic will contact you in 1 to 2   weeks with any results.  - Discharge patient to home.   - Resume previous diet.   - Continue present medications.   - Await pathology results.   - Return to referring physician.  For questions, problems or results please call your physician Melanie Ballard MD at Work:  [phone]  If you have any questions about the above instructions, call the GI   department at [phone] or call the endoscopy unit at [phone]   from 7am until 3 pm.  [facility] [place] [facility], EMERGENCY ROOM PHONE NUMBER:   [phone]  IF A COMPLICATION OR EMERGENCY SITUATION ARISES AND YOU ARE UNABLE TO REACH   YOUR PHYSICIAN - GO DIRECTLY TO THE EMERGENCY ROOM.  I have read or have had read to me these discharge instructions for my   procedure and have received a written copy.  I understand these   instructions and will follow-up with my physician if I have any questions.     __________________________________       _____________________________________  Nurse Signature                                          Patient/Designated   Responsible Party Signature  Melanie Ballard MD  3/28/2022 12:44:59 PM  This report has been verified and signed electronically.  Dear patient,  As a result of recent federal legislation (The Federal Cures Act), you may   receive lab or pathology results from your procedure in your [facility]   account before your  physician is able to contact you. Your physician or   their representative will relay the results to you with their   recommendations at their soonest availability.  Thank you,  PROVATION

## 2022-03-28 NOTE — PLAN OF CARE
Dr guadarrama came to bedside to discuss findings. Vital signs stable, no patient c/o nausea/vomitting, no abdominal pain, no gi bleeding. Patient to be discharged from unit.

## 2022-03-28 NOTE — TRANSFER OF CARE
"Anesthesia Transfer of Care Note    Patient: Ambar Duenas    Procedure(s) Performed: Procedure(s) (LRB):  ESOPHAGOGASTRODUODENOSCOPY (EGD) (N/A)  COLONOSCOPY (N/A)    Patient location: PACU    Anesthesia Type: MAC    Transport from OR: Transported from OR on room air with adequate spontaneous ventilation    Post pain: adequate analgesia    Post assessment: no apparent anesthetic complications    Post vital signs: stable    Level of consciousness: awake and alert    Nausea/Vomiting: no nausea/vomiting    Complications: none          Last vitals:   Visit Vitals  /65 (BP Location: Left arm, Patient Position: Sitting)   Pulse 105   Temp 36.7 °C (98.1 °F) (Temporal)   Resp 19   Ht 5' 9" (1.753 m)   Wt 83.9 kg (185 lb)   LMP 06/03/2019   SpO2 100%   Breastfeeding No   BMI 27.32 kg/m²     "

## 2022-03-30 ENCOUNTER — PATIENT MESSAGE (OUTPATIENT)
Dept: INTERNAL MEDICINE | Facility: CLINIC | Age: 72
End: 2022-03-30
Payer: MEDICARE

## 2022-04-07 ENCOUNTER — TELEPHONE (OUTPATIENT)
Dept: OBSTETRICS AND GYNECOLOGY | Facility: CLINIC | Age: 72
End: 2022-04-07
Payer: MEDICARE

## 2022-04-07 NOTE — TELEPHONE ENCOUNTER
----- Message from Isi Lord sent at 4/7/2022  9:50 AM CDT -----  Contact: Ambar Villalta would like a callback at [phone] to reschedule appointment that was missed for today-

## 2022-04-08 ENCOUNTER — OFFICE VISIT (OUTPATIENT)
Dept: INTERNAL MEDICINE | Facility: CLINIC | Age: 72
End: 2022-04-08
Payer: MEDICARE

## 2022-04-08 VITALS
SYSTOLIC BLOOD PRESSURE: 122 MMHG | HEART RATE: 122 BPM | OXYGEN SATURATION: 95 % | HEIGHT: 69 IN | TEMPERATURE: 99 F | DIASTOLIC BLOOD PRESSURE: 70 MMHG | BODY MASS INDEX: 27.62 KG/M2 | RESPIRATION RATE: 18 BRPM | WEIGHT: 186.5 LBS

## 2022-04-08 DIAGNOSIS — D64.9 ANEMIA, UNSPECIFIED TYPE: ICD-10-CM

## 2022-04-08 DIAGNOSIS — C18.9 COLON CANCER: Primary | ICD-10-CM

## 2022-04-08 DIAGNOSIS — Z78.0 POSTMENOPAUSAL: Primary | ICD-10-CM

## 2022-04-08 PROCEDURE — 3008F BODY MASS INDEX DOCD: CPT | Mod: CPTII,S$GLB,, | Performed by: FAMILY MEDICINE

## 2022-04-08 PROCEDURE — 1159F PR MEDICATION LIST DOCUMENTED IN MEDICAL RECORD: ICD-10-PCS | Mod: CPTII,S$GLB,, | Performed by: FAMILY MEDICINE

## 2022-04-08 PROCEDURE — 3074F SYST BP LT 130 MM HG: CPT | Mod: CPTII,S$GLB,, | Performed by: FAMILY MEDICINE

## 2022-04-08 PROCEDURE — 99999 PR PBB SHADOW E&M-EST. PATIENT-LVL IV: CPT | Mod: PBBFAC,,, | Performed by: FAMILY MEDICINE

## 2022-04-08 PROCEDURE — 1159F MED LIST DOCD IN RCRD: CPT | Mod: CPTII,S$GLB,, | Performed by: FAMILY MEDICINE

## 2022-04-08 PROCEDURE — 3078F PR MOST RECENT DIASTOLIC BLOOD PRESSURE < 80 MM HG: ICD-10-PCS | Mod: CPTII,S$GLB,, | Performed by: FAMILY MEDICINE

## 2022-04-08 PROCEDURE — 1101F PT FALLS ASSESS-DOCD LE1/YR: CPT | Mod: CPTII,S$GLB,, | Performed by: FAMILY MEDICINE

## 2022-04-08 PROCEDURE — 3008F PR BODY MASS INDEX (BMI) DOCUMENTED: ICD-10-PCS | Mod: CPTII,S$GLB,, | Performed by: FAMILY MEDICINE

## 2022-04-08 PROCEDURE — 3078F DIAST BP <80 MM HG: CPT | Mod: CPTII,S$GLB,, | Performed by: FAMILY MEDICINE

## 2022-04-08 PROCEDURE — 3288F PR FALLS RISK ASSESSMENT DOCUMENTED: ICD-10-PCS | Mod: CPTII,S$GLB,, | Performed by: FAMILY MEDICINE

## 2022-04-08 PROCEDURE — 1126F PR PAIN SEVERITY QUANTIFIED, NO PAIN PRESENT: ICD-10-PCS | Mod: CPTII,S$GLB,, | Performed by: FAMILY MEDICINE

## 2022-04-08 PROCEDURE — 99397 PER PM REEVAL EST PAT 65+ YR: CPT | Mod: S$GLB,,, | Performed by: FAMILY MEDICINE

## 2022-04-08 PROCEDURE — 1101F PR PT FALLS ASSESS DOC 0-1 FALLS W/OUT INJ PAST YR: ICD-10-PCS | Mod: CPTII,S$GLB,, | Performed by: FAMILY MEDICINE

## 2022-04-08 PROCEDURE — 99999 PR PBB SHADOW E&M-EST. PATIENT-LVL IV: ICD-10-PCS | Mod: PBBFAC,,, | Performed by: FAMILY MEDICINE

## 2022-04-08 PROCEDURE — 3288F FALL RISK ASSESSMENT DOCD: CPT | Mod: CPTII,S$GLB,, | Performed by: FAMILY MEDICINE

## 2022-04-08 PROCEDURE — 99397 PR PREVENTIVE VISIT,EST,65 & OVER: ICD-10-PCS | Mod: S$GLB,,, | Performed by: FAMILY MEDICINE

## 2022-04-08 PROCEDURE — 3074F PR MOST RECENT SYSTOLIC BLOOD PRESSURE < 130 MM HG: ICD-10-PCS | Mod: CPTII,S$GLB,, | Performed by: FAMILY MEDICINE

## 2022-04-08 PROCEDURE — 1126F AMNT PAIN NOTED NONE PRSNT: CPT | Mod: CPTII,S$GLB,, | Performed by: FAMILY MEDICINE

## 2022-04-08 NOTE — PROGRESS NOTES
Ambar Duenas  04/09/2022  [id number]    Gin Vitale MD  Patient Care Team:  Gin Vitale MD as PCP - General (Internal Medicine)    Has the patient seen any provider outside of the network since the last visit ? (no). If yes, HIPPA forms completed and records requested.      Visit Type:a scheduled routine follow-up visit    Chief Complaint:  Chief Complaint   Patient presents with    Annual Exam       History of Present Illness:  HPI Ms. Duenas presents today for her annual exam. 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 Biopsied.     Recent labs showed anemia   H/H: 7/24.9    Review of Systems   Constitutional: Negative for chills and fever.   HENT: Negative for congestion and tinnitus.    Eyes: Negative for blurred vision, pain and discharge.   Respiratory: Negative for cough and wheezing.    Cardiovascular: Negative for chest pain, palpitations, orthopnea and leg swelling.   Gastrointestinal: Negative for abdominal pain, blood in stool, constipation, diarrhea, heartburn, nausea and vomiting.   Genitourinary: Negative for dysuria, flank pain, frequency, hematuria and urgency.   Skin: Negative for itching and rash.   Neurological: Negative for dizziness, tingling and headaches.   Psychiatric/Behavioral: Negative for depression.       Screening Questionnaires:    In the last two weeks how often have you felt down, depressed, or hopeless ( no )    In the last two weeks how often have you had little interest or pleasure in doing  (no )    In the last two weeks how often have you been bothered by the following problems:  1. Feeling nervous, anxious, or on edge ( no )    2. Not being able to stop or control worrying ( no)    3. Worrying too much about different things ( no)    4. Trouble relaxing ( no )    5. Being so restless that it is hard to sit still  (no )    6. Becoming easily  annoyed or irritable (no)    7. Feeling afraid as if something awful might happen (no )    How often do you have a drink containing Alcohol? denied     Do you exercise  (yes ) very active    Do you take a baby Aspirin daily ( no)    Do you have an advance directive ( no ) The patient was given information regarding Living Will/Durable Power-of- if requested.     The following were reviewed: Active problem list, medication list, allergies, family history, social history, and Health Maintenance.     History:  Past Medical History:   Diagnosis Date    Anemia, unspecified     Fibroids     GERD (gastroesophageal reflux disease)     Hyperlipidemia     Hypertension      Past Surgical History:   Procedure Laterality Date    COLONOSCOPY N/A 3/28/2022    Procedure: COLONOSCOPY;  Surgeon: Melanie Ballard MD;  Location: [facility];  Service: Endoscopy;  Laterality: N/A;    ESOPHAGOGASTRODUODENOSCOPY N/A 3/28/2022    Procedure: ESOPHAGOGASTRODUODENOSCOPY (EGD);  Surgeon: Melanie Ballard MD;  Location: [facility];  Service: Endoscopy;  Laterality: N/A;    HEMORRHOID SURGERY       No family history on file.  Social History     Socioeconomic History    Marital status:    Tobacco Use    Smoking status: Never Smoker    Smokeless tobacco: Never Used   Substance and Sexual Activity    Alcohol use: Yes     Alcohol/week: 2.0 standard drinks     Types: 2 Glasses of wine per week     Comment: occ    Drug use: Never    Sexual activity: Not Currently     Partners: Male     Birth control/protection: Post-menopausal     There is no problem list on file for this patient.    Review of patient's allergies indicates:  No Known Allergies    Health Maintenance  Health Maintenance Topics with due status: Not Due       Topic Last Completion Date    Lipid Panel 03/08/2022    Colorectal Cancer Screening 03/28/2022     Health Maintenance Due   Topic Date Due    TETANUS VACCINE  Never done    Mammogram  Never done     DEXA Scan  Never done    Shingles Vaccine (1 of 2) Never done    Pneumococcal Vaccines (Age 65+) (1 - PCV) Never done       Medications:  Current Outpatient Medications on File Prior to Visit   Medication Sig Dispense Refill    amLODIPine (NORVASC) 5 MG tablet Take 1 tablet (5 mg total) by mouth once daily. 90 tablet 3    montelukast (SINGULAIR) 10 mg tablet Take 1 tablet (10 mg total) by mouth every evening. 30 tablet 3    pantoprazole (PROTONIX) 40 MG tablet TAKE 1 TABLET(40 MG) BY MOUTH TWICE DAILY 180 tablet 0    cyclobenzaprine (FLEXERIL) 5 MG tablet Take 5 mg by mouth 3 (three) times daily as needed for Muscle spasms.       No current facility-administered medications on file prior to visit.       Medications have been reviewed and reconciled with patient at visit today.    Barriers to medications present (no )    Adverse reactions to current medications (no)    Over the counter medications reviewed (Yes) and if needed added to active Medication list.    Exam:  Vitals:    04/08/22 1307   BP: 122/70   Pulse: (!) 122   Resp: 18   Temp: 98.8 °F (37.1 °C)     Weight: 84.6 kg (186 lb 8.2 oz)   Body mass index is 27.54 kg/m².      Physical Exam  Vitals and nursing note reviewed.   Constitutional:       General: She is not in acute distress.  HENT:      Head: Normocephalic and atraumatic.      Right Ear: External ear normal.      Left Ear: External ear normal.      Nose: Nose normal.   Eyes:      General:         Right eye: No discharge.         Left eye: No discharge.      Pupils: Pupils are equal, round, and reactive to light.   Neck:      Thyroid: No thyromegaly.   Cardiovascular:      Rate and Rhythm: Normal rate and regular rhythm.      Heart sounds: Normal heart sounds. No murmur heard.  Pulmonary:      Effort: Pulmonary effort is normal. No respiratory distress.      Breath sounds: Normal breath sounds. No wheezing.   Abdominal:      General: Bowel sounds are normal. There is no distension.       Palpations: Abdomen is soft.      Tenderness: There is no abdominal tenderness.   Musculoskeletal:         General: Normal range of motion.      Cervical back: Normal range of motion and neck supple.   Skin:     General: Skin is warm and dry.      Findings: No rash.   Neurological:      Mental Status: She is alert and oriented to person, place, and time.      Coordination: Coordination normal.   Psychiatric:         Behavior: Behavior normal.         Laboratory Reviewed: (Yes)  Lab Results   Component Value Date    WBC 5.31 03/08/2022    HGB 7.0 (L) 03/08/2022    HCT 24.9 (L) 03/08/2022     (H) 03/08/2022    CHOL 211 (H) 03/08/2022    TRIG 65 03/08/2022    HDL 54 03/08/2022    ALT 9 (L) 03/08/2022    ALT 9 (L) 03/08/2022    AST 12 03/08/2022    AST 12 03/08/2022     03/08/2022     03/08/2022    K 3.7 03/08/2022    K 3.7 03/08/2022     03/08/2022     03/08/2022    CREATININE 0.7 03/08/2022    CREATININE 0.7 03/08/2022    BUN 9 03/08/2022    BUN 9 03/08/2022    CO2 25 03/08/2022    CO2 25 03/08/2022    TSH 1.362 03/08/2022       Assessment:  The primary encounter diagnosis was Postmenopausal. A diagnosis of Anemia, unspecified type was also pertinent to this visit.    Plan:  Postmenopausal  -     DXA Bone Density Spine And Hip; Future; Expected date: 04/08/2022    Anemia, unspecified type  -     Ambulatory referral/consult to Hematology / Oncology; Future; Expected date: 04/15/2022      -Patient's lab results were reviewed and discussed with patient  -Treatment options and alternatives were discussed with the patient. Patient expressed understanding. Patient was given the opportunity to ask questions and be an active participant in their medical care. Patient had no further questions or concerns at this time.   -Documentation of patient's health and condition was obtained from family member who was present during visit.  -Patient is an overall moderate risk for health complications from  their medical conditions.     Follow up: follow up as needed y  Follow up 1 year for annual      Care Plan/Goals: Reviewed N/A   Goals    None             After visit summary printed and given to patient upon discharge.  Patient goals and care plan are included in After visit summary.

## 2022-04-13 LAB
COMMENT: NORMAL
FINAL PATHOLOGIC DIAGNOSIS: NORMAL
GROSS: NORMAL
Lab: NORMAL
MICROSCOPIC EXAM: NORMAL
SUPPLEMENTAL DIAGNOSIS: NORMAL

## 2022-04-14 ENCOUNTER — PATIENT MESSAGE (OUTPATIENT)
Dept: GASTROENTEROLOGY | Facility: CLINIC | Age: 72
End: 2022-04-14
Payer: MEDICARE

## 2022-04-14 DIAGNOSIS — C18.9 MALIGNANT NEOPLASM OF COLON, UNSPECIFIED PART OF COLON: Primary | ICD-10-CM

## 2022-04-14 DIAGNOSIS — C18.7 MALIGNANT NEOPLASM OF SIGMOID COLON: Primary | ICD-10-CM

## 2022-04-18 ENCOUNTER — PATIENT MESSAGE (OUTPATIENT)
Dept: GASTROENTEROLOGY | Facility: CLINIC | Age: 72
End: 2022-04-18
Payer: MEDICARE

## 2022-04-19 ENCOUNTER — PATIENT MESSAGE (OUTPATIENT)
Dept: GASTROENTEROLOGY | Facility: CLINIC | Age: 72
End: 2022-04-19
Payer: MEDICARE

## 2022-04-20 ENCOUNTER — TELEPHONE (OUTPATIENT)
Dept: ADMINISTRATIVE | Facility: HOSPITAL | Age: 72
End: 2022-04-20
Payer: MEDICARE

## 2022-04-20 ENCOUNTER — OFFICE VISIT (OUTPATIENT)
Dept: HEMATOLOGY/ONCOLOGY | Facility: CLINIC | Age: 72
End: 2022-04-20
Payer: MEDICARE

## 2022-04-20 ENCOUNTER — LAB VISIT (OUTPATIENT)
Dept: LAB | Facility: HOSPITAL | Age: 72
End: 2022-04-20
Attending: INTERNAL MEDICINE
Payer: MEDICARE

## 2022-04-20 VITALS
WEIGHT: 178.56 LBS | OXYGEN SATURATION: 100 % | BODY MASS INDEX: 26.45 KG/M2 | TEMPERATURE: 98 F | HEIGHT: 69 IN | DIASTOLIC BLOOD PRESSURE: 76 MMHG | HEART RATE: 107 BPM | SYSTOLIC BLOOD PRESSURE: 123 MMHG

## 2022-04-20 DIAGNOSIS — D53.9 NUTRITIONAL ANEMIA: ICD-10-CM

## 2022-04-20 DIAGNOSIS — D75.839 THROMBOCYTOSIS: ICD-10-CM

## 2022-04-20 DIAGNOSIS — C18.7 MALIGNANT NEOPLASM OF SIGMOID COLON: ICD-10-CM

## 2022-04-20 DIAGNOSIS — D50.9 MICROCYTIC ANEMIA: ICD-10-CM

## 2022-04-20 DIAGNOSIS — D53.9 NUTRITIONAL ANEMIA: Primary | ICD-10-CM

## 2022-04-20 LAB
ALBUMIN SERPL BCP-MCNC: 2.7 G/DL (ref 3.5–5.2)
ALP SERPL-CCNC: 56 U/L (ref 55–135)
ALT SERPL W/O P-5'-P-CCNC: 8 U/L (ref 10–44)
ANION GAP SERPL CALC-SCNC: 12 MMOL/L (ref 8–16)
AST SERPL-CCNC: 10 U/L (ref 10–40)
BASOPHILS # BLD AUTO: 0.03 K/UL (ref 0–0.2)
BASOPHILS NFR BLD: 0.4 % (ref 0–1.9)
BILIRUB SERPL-MCNC: 0.3 MG/DL (ref 0.1–1)
BUN SERPL-MCNC: 13 MG/DL (ref 8–23)
CALCIUM SERPL-MCNC: 9.3 MG/DL (ref 8.7–10.5)
CHLORIDE SERPL-SCNC: 105 MMOL/L (ref 95–110)
CO2 SERPL-SCNC: 23 MMOL/L (ref 23–29)
CREAT SERPL-MCNC: 0.8 MG/DL (ref 0.5–1.4)
DIFFERENTIAL METHOD: ABNORMAL
EOSINOPHIL # BLD AUTO: 0 K/UL (ref 0–0.5)
EOSINOPHIL NFR BLD: 0.4 % (ref 0–8)
ERYTHROCYTE [DISTWIDTH] IN BLOOD BY AUTOMATED COUNT: 19.9 % (ref 11.5–14.5)
EST. GFR  (AFRICAN AMERICAN): >60 ML/MIN/1.73 M^2
EST. GFR  (NON AFRICAN AMERICAN): >60 ML/MIN/1.73 M^2
GLUCOSE SERPL-MCNC: 102 MG/DL (ref 70–110)
HCT VFR BLD AUTO: 22.8 % (ref 37–48.5)
HGB BLD-MCNC: 6.7 G/DL (ref 12–16)
IMM GRANULOCYTES # BLD AUTO: 0.03 K/UL (ref 0–0.04)
IMM GRANULOCYTES NFR BLD AUTO: 0.4 % (ref 0–0.5)
LYMPHOCYTES # BLD AUTO: 1.2 K/UL (ref 1–4.8)
LYMPHOCYTES NFR BLD: 16.2 % (ref 18–48)
MCH RBC QN AUTO: 19.6 PG (ref 27–31)
MCHC RBC AUTO-ENTMCNC: 29.4 G/DL (ref 32–36)
MCV RBC AUTO: 67 FL (ref 82–98)
MONOCYTES # BLD AUTO: 0.5 K/UL (ref 0.3–1)
MONOCYTES NFR BLD: 6.6 % (ref 4–15)
NEUTROPHILS # BLD AUTO: 5.7 K/UL (ref 1.8–7.7)
NEUTROPHILS NFR BLD: 76 % (ref 38–73)
NRBC BLD-RTO: 0 /100 WBC
PLATELET # BLD AUTO: 546 K/UL (ref 150–450)
PMV BLD AUTO: 8.2 FL (ref 9.2–12.9)
POTASSIUM SERPL-SCNC: 3.8 MMOL/L (ref 3.5–5.1)
PROT SERPL-MCNC: 7.6 G/DL (ref 6–8.4)
RBC # BLD AUTO: 3.41 M/UL (ref 4–5.4)
SODIUM SERPL-SCNC: 140 MMOL/L (ref 136–145)
WBC # BLD AUTO: 7.52 K/UL (ref 3.9–12.7)

## 2022-04-20 PROCEDURE — 1159F MED LIST DOCD IN RCRD: CPT | Mod: CPTII,S$GLB,, | Performed by: INTERNAL MEDICINE

## 2022-04-20 PROCEDURE — 3078F DIAST BP <80 MM HG: CPT | Mod: CPTII,S$GLB,, | Performed by: INTERNAL MEDICINE

## 2022-04-20 PROCEDURE — 82607 VITAMIN B-12: CPT | Performed by: INTERNAL MEDICINE

## 2022-04-20 PROCEDURE — 82378 CARCINOEMBRYONIC ANTIGEN: CPT | Performed by: INTERNAL MEDICINE

## 2022-04-20 PROCEDURE — 3008F PR BODY MASS INDEX (BMI) DOCUMENTED: ICD-10-PCS | Mod: CPTII,S$GLB,, | Performed by: INTERNAL MEDICINE

## 2022-04-20 PROCEDURE — 99205 PR OFFICE/OUTPT VISIT, NEW, LEVL V, 60-74 MIN: ICD-10-PCS | Mod: S$GLB,,, | Performed by: INTERNAL MEDICINE

## 2022-04-20 PROCEDURE — 85025 COMPLETE CBC W/AUTO DIFF WBC: CPT | Performed by: INTERNAL MEDICINE

## 2022-04-20 PROCEDURE — 82728 ASSAY OF FERRITIN: CPT | Performed by: INTERNAL MEDICINE

## 2022-04-20 PROCEDURE — 3008F BODY MASS INDEX DOCD: CPT | Mod: CPTII,S$GLB,, | Performed by: INTERNAL MEDICINE

## 2022-04-20 PROCEDURE — 3288F PR FALLS RISK ASSESSMENT DOCUMENTED: ICD-10-PCS | Mod: CPTII,S$GLB,, | Performed by: INTERNAL MEDICINE

## 2022-04-20 PROCEDURE — 99999 PR PBB SHADOW E&M-EST. PATIENT-LVL IV: CPT | Mod: PBBFAC,,, | Performed by: INTERNAL MEDICINE

## 2022-04-20 PROCEDURE — 36415 COLL VENOUS BLD VENIPUNCTURE: CPT | Performed by: INTERNAL MEDICINE

## 2022-04-20 PROCEDURE — 84466 ASSAY OF TRANSFERRIN: CPT | Performed by: INTERNAL MEDICINE

## 2022-04-20 PROCEDURE — 1126F PR PAIN SEVERITY QUANTIFIED, NO PAIN PRESENT: ICD-10-PCS | Mod: CPTII,S$GLB,, | Performed by: INTERNAL MEDICINE

## 2022-04-20 PROCEDURE — 84443 ASSAY THYROID STIM HORMONE: CPT | Performed by: INTERNAL MEDICINE

## 2022-04-20 PROCEDURE — 1101F PT FALLS ASSESS-DOCD LE1/YR: CPT | Mod: CPTII,S$GLB,, | Performed by: INTERNAL MEDICINE

## 2022-04-20 PROCEDURE — 82746 ASSAY OF FOLIC ACID SERUM: CPT | Performed by: INTERNAL MEDICINE

## 2022-04-20 PROCEDURE — 1101F PR PT FALLS ASSESS DOC 0-1 FALLS W/OUT INJ PAST YR: ICD-10-PCS | Mod: CPTII,S$GLB,, | Performed by: INTERNAL MEDICINE

## 2022-04-20 PROCEDURE — 3078F PR MOST RECENT DIASTOLIC BLOOD PRESSURE < 80 MM HG: ICD-10-PCS | Mod: CPTII,S$GLB,, | Performed by: INTERNAL MEDICINE

## 2022-04-20 PROCEDURE — 83921 ORGANIC ACID SINGLE QUANT: CPT | Performed by: INTERNAL MEDICINE

## 2022-04-20 PROCEDURE — 3074F SYST BP LT 130 MM HG: CPT | Mod: CPTII,S$GLB,, | Performed by: INTERNAL MEDICINE

## 2022-04-20 PROCEDURE — 99205 OFFICE O/P NEW HI 60 MIN: CPT | Mod: S$GLB,,, | Performed by: INTERNAL MEDICINE

## 2022-04-20 PROCEDURE — 1159F PR MEDICATION LIST DOCUMENTED IN MEDICAL RECORD: ICD-10-PCS | Mod: CPTII,S$GLB,, | Performed by: INTERNAL MEDICINE

## 2022-04-20 PROCEDURE — 80053 COMPREHEN METABOLIC PANEL: CPT | Performed by: INTERNAL MEDICINE

## 2022-04-20 PROCEDURE — 3074F PR MOST RECENT SYSTOLIC BLOOD PRESSURE < 130 MM HG: ICD-10-PCS | Mod: CPTII,S$GLB,, | Performed by: INTERNAL MEDICINE

## 2022-04-20 PROCEDURE — 99999 PR PBB SHADOW E&M-EST. PATIENT-LVL IV: ICD-10-PCS | Mod: PBBFAC,,, | Performed by: INTERNAL MEDICINE

## 2022-04-20 PROCEDURE — 1126F AMNT PAIN NOTED NONE PRSNT: CPT | Mod: CPTII,S$GLB,, | Performed by: INTERNAL MEDICINE

## 2022-04-20 PROCEDURE — 3288F FALL RISK ASSESSMENT DOCD: CPT | Mod: CPTII,S$GLB,, | Performed by: INTERNAL MEDICINE

## 2022-04-20 NOTE — ASSESSMENT & PLAN NOTE
Suspect blood loss anemia from colonic mass resulting in iron deficiency.  Will obtain iron studies and based results will determine to therapy.    Will repeat CBC today to determine if need for blood transfusion.  CBC from 03/08/2022 showed hemoglobin 7.0 per dL.  She remains asymptomatic.

## 2022-04-20 NOTE — PROGRESS NOTES
Subjective:   Date of Visit: 4/20/22   ?   ?    REFERRING PROVIDER: Melanie Ballard MD  [address] [place] [address] [place] [id number]   ?   CHIEF COMPLAINT:  Colon cancer???????   ?   ONCOLOGIC DIAGNOSIS:  Invasive colon adenocarcinoma with mucinous features  ?   CURRENT TREATMENT:  None    PAST TREATMENT:  None  ?   ONCOLOGIC HISTORY:     Final Pathologic Diagnosis 1. Esophagus, distal, biopsy:   Esophageal squamous mucosa with minimally active esophagitis.   Gastric cardia-type mucosa with mild chronic inflammation.   Negative for intestinal metaplasia and dysplasia.   GMS stain is negative for fungal organisms.   2. Esophagus, proximal, biopsy:   Esophageal squamous mucosa with reactive changes and focally active esophagitis.   3. Sigmoid colon, mass, biopsy:   Invasive adenocarcinoma, with mucinous features.  VC      Comment: Interp By Wilder Eddy M.D., Signed on 04/13/2022 at 16:45   Supplemental Diagnosis Immunohistochemistry (IHC) Testing for Mismatch Repair (MMR) Proteins   MLH1   Intact nuclear expression   MSH2   Intact nuclear expression   MSH6   Intact nuclear expression   PMS2   Intact nuclear expression   Background nonneoplastic tissue/internal control with intact nuclear   expression   IHC Interpretation   No loss of nuclear expression of MMR proteins: low probability of   microsatellite instability-high (MSI-H)   There are exceptions to the above IHC interpretations. These results should   not be considered in isolation, and clinical correlation with genetic   counseling is recommended to assess the need for germline testing           HPI:  71-year-old with history GERD, hyperlipidemia, hypertension was referred to us by Dr. Ballard due to recent invasive colon cancer involving the sigmoid colon.  Apparently patient was seen PCP for routine visit.  CBC revealed microcytic anemia with iron deficiency.  She was referred for screening colonoscopy which revealed a fungating completely  obstructing large mass in the sigmoid colon. Oozing was present. Biopsies showed an invasive colon adenocarcinoma mucinous features.    Given the above findings, she was referred to us for further evaluation.  She denies melena hematochezia.  Appetite is stable.  She endorses normal bowel movement.  Denies nausea vomiting chest pain or shortness of breath.    No pertinent family history.    Review of Systems   Constitutional: Negative for activity change, appetite change, chills, fever and unexpected weight change.   HENT: Negative for hearing loss, mouth sores, nosebleeds, sore throat, tinnitus, trouble swallowing and voice change.    Eyes: Negative for visual disturbance.   Respiratory: Negative for cough, chest tightness and shortness of breath.    Cardiovascular: Negative for chest pain, palpitations and leg swelling.   Gastrointestinal: Negative for anal bleeding, blood in stool, constipation, diarrhea, nausea and vomiting.   Genitourinary: Negative for dysuria, frequency, hematuria, pelvic pain, vaginal bleeding and vaginal pain.   Musculoskeletal: Negative for arthralgias, back pain, joint swelling and neck pain.   Skin: Negative for color change, pallor, rash and wound.   Allergic/Immunologic: Negative for immunocompromised state.   Neurological: Negative for dizziness, tremors, syncope, speech difficulty, light-headedness and headaches.   Hematological: Negative for adenopathy. Does not bruise/bleed easily.   Psychiatric/Behavioral: Negative for agitation, confusion, decreased concentration, hallucinations and sleep disturbance. The patient is not nervous/anxious.        ?   PAST MEDICAL HISTORY:   Past Medical History:   Diagnosis Date    Anemia, unspecified     Fibroids     GERD (gastroesophageal reflux disease)     Hyperlipidemia     Hypertension     ?     PAST SURGICAL HISTORY:   Past Surgical History:   Procedure Laterality Date    COLONOSCOPY N/A 3/28/2022    Procedure: COLONOSCOPY;  Surgeon:  Melanie Ballard MD;  Location: [facility];  Service: Endoscopy;  Laterality: N/A;    ESOPHAGOGASTRODUODENOSCOPY N/A 3/28/2022    Procedure: ESOPHAGOGASTRODUODENOSCOPY (EGD);  Surgeon: Melanie Ballard MD;  Location: [facility];  Service: Endoscopy;  Laterality: N/A;    HEMORRHOID SURGERY        ?   ALLERGIES:   Allergies as of 04/20/2022    (No Known Allergies)      ?   MEDICATIONS:?   Outpatient Medications Marked as Taking for the 4/20/22 encounter (Office Visit) with Farooq Limon MD   Medication Sig Dispense Refill    amLODIPine (NORVASC) 5 MG tablet Take 1 tablet (5 mg total) by mouth once daily. 90 tablet 3    pantoprazole (PROTONIX) 40 MG tablet TAKE 1 TABLET(40 MG) BY MOUTH TWICE DAILY 180 tablet 0      ?   SOCIAL HISTORY:?   Social History     Tobacco Use    Smoking status: Never Smoker    Smokeless tobacco: Never Used   Substance Use Topics    Alcohol use: Yes     Alcohol/week: 2.0 standard drinks     Types: 2 Glasses of wine per week     Comment: occ        ?   FAMILY HISTORY:   family history is not on file.   ?     Objective:      Physical Exam  Constitutional:       General: She is not in acute distress.     Appearance: She is well-developed. She is not ill-appearing or toxic-appearing.   HENT:      Head: Normocephalic and atraumatic.      Mouth/Throat:      Pharynx: No oropharyngeal exudate.   Eyes:      General: No scleral icterus.        Right eye: No discharge.         Left eye: No discharge.      Conjunctiva/sclera: Conjunctivae normal.      Pupils: Pupils are equal, round, and reactive to light.   Neck:      Thyroid: No thyromegaly.   Cardiovascular:      Rate and Rhythm: Normal rate and regular rhythm.      Heart sounds: No murmur heard.  Pulmonary:      Effort: Pulmonary effort is normal. No respiratory distress.      Breath sounds: Normal breath sounds.   Chest:      Chest wall: No tenderness.   Breasts:      Right: No supraclavicular adenopathy.      Left: No  supraclavicular adenopathy.       Abdominal:      General: Bowel sounds are normal. There is no distension.      Palpations: Abdomen is soft. There is no mass.      Tenderness: There is no abdominal tenderness. There is no guarding or rebound.   Musculoskeletal:         General: No tenderness. Normal range of motion.      Cervical back: Normal range of motion and neck supple.   Lymphadenopathy:      Cervical: No cervical adenopathy.      Right cervical: No superficial cervical adenopathy.     Left cervical: No superficial cervical adenopathy.      Upper Body:      Right upper body: No supraclavicular or pectoral adenopathy.      Left upper body: No supraclavicular or pectoral adenopathy.   Skin:     General: Skin is warm and dry.      Capillary Refill: Capillary refill takes 2 to 3 seconds.      Coloration: Skin is not pale.      Findings: No erythema or rash.   Neurological:      Mental Status: She is alert and oriented to person, place, and time.      Cranial Nerves: No cranial nerve deficit.      Sensory: No sensory deficit.   Psychiatric:         Behavior: Behavior normal. Behavior is cooperative.         Judgment: Judgment normal.         ?   Vitals:    04/20/22 1559   BP: 123/76   Pulse: 107   Temp: 97.9 °F (36.6 °C)      ?     ECOG SCORE    1 - Restricted in strenuous activity-ambulatory and able to carry out work of a light nature           ?   Laboratory:  ?   No visits with results within 1 Day(s) from this visit.   Latest known visit with results is:   Admission on 03/28/2022, Discharged on 03/28/2022   Component Date Value Ref Range Status    Final Pathologic Diagnosis 03/28/2022    Corrected                    Value:1. Esophagus, distal, biopsy:  Esophageal squamous mucosa with minimally active esophagitis.  Gastric cardia-type mucosa with mild chronic inflammation.  Negative for intestinal metaplasia and dysplasia.  GMS stain is negative for fungal organisms.  2. Esophagus, proximal,  biopsy:  Esophageal squamous mucosa with reactive changes and focally active  esophagitis.  3. Sigmoid colon, mass, biopsy:  Invasive adenocarcinoma, with mucinous features.      Supplemental Diagnosis 03/28/2022    Corrected                    Value:Immunohistochemistry (IHC) Testing for Mismatch Repair (MMR) Proteins  MLH1  Intact nuclear expression  MSH2  Intact nuclear expression  MSH6  Intact nuclear expression  PMS2  Intact nuclear expression  Background nonneoplastic tissue/internal control with intact nuclear  expression  IHC Interpretation  No loss of nuclear expression of MMR proteins: low probability of  microsatellite instability-high (MSI-H)  There are exceptions to the above IHC interpretations. These results should  not be considered in isolation, and clinical correlation with genetic  counseling is recommended to assess the need for germline testing.      Comment 03/28/2022    Corrected                    Value:MMR protein IHCs are pending and will be reported in an addendum.  Part 3 seen in consultation with Dr. Stanley who agrees with the above  diagnosis.      Microscopic Exam 03/28/2022    Corrected                    Value:Part 3: Additional levels were examined.  Special stain controls were reviewed and were adequate.      Gross 03/28/2022    Corrected                    Value:1:  Surgery ID:  [id number];  Pathology ID:  [id number]  1. Received in formalin labeled distal esophagus biopsy, are multiple  fragments, 1 mm in greatest dimension. Submitted entirely.  DEG--1-A  Grossed by: Sweetie Basurto   2: Surgery ID:  [id number];  Pathology ID:  [id number]  2. Received in formalin labeled prox esophageal biopsy, are multiple  fragments, 2-3 mm in greatest dimension. Submitted entirely.  ZIR--2-A  Grossed by: Sweetie Basurto   3: Surgery ID:  [id number];  Pathology ID:  [id number]  3. Received in formalin labeled sigmoid colon mass biopsy, are multiple  fragments, 0.5-2 mm in greatest dimension.  Submitted entirely.  FTU--3-A  Grossed by: Sweetie Basurto      Disclaimer 03/28/2022    Corrected                    Value:Unless the case is a 'gross only' or additional testing only, the final  diagnosis for each specimen is based on a microscopic examination of  appropriate tissue sections.        ?   Tumor markers   ?   ?   Imaging: US Pelvis Comp with Transvag NON-OB (xpd)  Narrative: EXAMINATION:  US PELVIS COMP WITH TRANSVAG NON-OB (XPD)    CLINICAL HISTORY:  Postmenopausal bleeding    TECHNIQUE:  Transabdominal sonography of the pelvis was performed, followed by transvaginal sonography to better evaluate the uterus and ovaries.    COMPARISON:  None.    FINDINGS:  Uterus:    Size: 7.7 x 3.2 x 5.2 cm    Masses: None.  Cerclage present at the cervix.    Endometrium: Abnormally thickened and mildly heterogeneous in this postmenopausal patient with bleeding measuring 13.6 mm.    Right ovary:    Size: 2.5 x 1.6 x 1.9 cm    Appearance: Normal transabdominal appearance.    Vascular flow: Normal.    Left ovary:    Size: 1.9 x 1.7 x 1.7 cm    Appearance: Normal transabdominal appearance.    Vascular Flow: Normal.    Free Fluid:    None.  Impression: Abnormal thickening and heterogeneity of endometrial stripe in postmenopausal patient with bleeding.  Further evaluation recommended.    Electronically signed by: JEAN-PAUL Chung MD  Date:    03/15/2022  Time:    14:24     ?      Pathology:  Pathology Results  (Last 10 years)               03/28/22 1259  Specimen to Pathology, Surgery Gastrointestinal tract Edited Result - FINAL    Narrative:  Pre-op Diagnosis: Dysphagia, unspecified type [R13.10]   Colon cancer screening [Z12.11]   Procedure(s):   ESOPHAGOGASTRODUODENOSCOPY (EGD)   COLONOSCOPY   Number of specimens: 3   Name of specimens:   1 distal esophagus bx r/o eoe   2 esophageal bx r/o eoe   3 sigmoid colon mass bx r/o malignancy   Release to patient->Immediate   Specimen total (fresh, frozen,  permanent):->3              ?   Assessment/Plan:       1. Nutritional anemia    2. Microcytic anemia    3. Thrombocytosis    4. Malignant neoplasm of sigmoid colon          Microcytic anemia  Suspect blood loss anemia from colonic mass resulting in iron deficiency.  Will obtain iron studies and based results will determine to therapy.    Will repeat CBC today to determine if need for blood transfusion.  CBC from 03/08/2022 showed hemoglobin 7.0 per dL.  She remains asymptomatic.      Thrombocytosis  Reactive due to iron deficiency.  Expect to improve with iron repletion    Malignant neoplasm of sigmoid colon  Invasive colonic adenocarcinoma with mucinous features.  Based on report, it appears mass was completely obstructing the sigmoid colon.    Plan;  -obtain CT scan of chest abdomen pelvis with IV contrast to rule out distant disease  -place referral to colorectal surgeon- Dr. Liu for evaluation given the large fungating obstructing mass in the sigmoid colon.    Return in 2-3 weeks to discuss management options.      ?Nutritional anemia  -     CBC Auto Differential; Future; Expected date: 04/20/2022  -     CBC Auto Differential; Future; Expected date: 04/20/2022  -     Comprehensive Metabolic Panel; Future; Expected date: 04/20/2022  -     Comprehensive Metabolic Panel; Future; Expected date: 04/20/2022  -     Iron and TIBC; Future; Expected date: 04/20/2022  -     Ferritin; Future; Expected date: 04/20/2022  -     Folate; Future; Expected date: 04/20/2022  -     TSH; Future; Expected date: 04/20/2022  -     Vitamin B12; Future; Expected date: 04/20/2022  -     Methylmalonic Acid, Serum; Future; Expected date: 04/20/2022  -     CEA; Future; Expected date: 04/20/2022  -     Ambulatory referral/consult to Colorectal Surgery; Future; Expected date: 04/27/2022    Microcytic anemia    Thrombocytosis  -     CBC Auto Differential; Future; Expected date: 04/20/2022  -     CBC Auto Differential; Future; Expected date:  04/20/2022  -     Comprehensive Metabolic Panel; Future; Expected date: 04/20/2022  -     Comprehensive Metabolic Panel; Future; Expected date: 04/20/2022  -     Iron and TIBC; Future; Expected date: 04/20/2022  -     Ferritin; Future; Expected date: 04/20/2022  -     Folate; Future; Expected date: 04/20/2022  -     TSH; Future; Expected date: 04/20/2022  -     Vitamin B12; Future; Expected date: 04/20/2022  -     Methylmalonic Acid, Serum; Future; Expected date: 04/20/2022  -     CEA; Future; Expected date: 04/20/2022  -     Ambulatory referral/consult to Colorectal Surgery; Future; Expected date: 04/27/2022    Malignant neoplasm of sigmoid colon  -     CBC Auto Differential; Future; Expected date: 04/20/2022  -     CBC Auto Differential; Future; Expected date: 04/20/2022  -     Comprehensive Metabolic Panel; Future; Expected date: 04/20/2022  -     Comprehensive Metabolic Panel; Future; Expected date: 04/20/2022  -     Iron and TIBC; Future; Expected date: 04/20/2022  -     Ferritin; Future; Expected date: 04/20/2022  -     Folate; Future; Expected date: 04/20/2022  -     TSH; Future; Expected date: 04/20/2022  -     Vitamin B12; Future; Expected date: 04/20/2022  -     Methylmalonic Acid, Serum; Future; Expected date: 04/20/2022  -     CEA; Future; Expected date: 04/20/2022  -     Ambulatory referral/consult to Colorectal Surgery; Future; Expected date: 04/27/2022  -     Ambulatory referral/consult to Hematology / Oncology    ?   Follow-Up: Follow up in about 2 weeks (around 5/4/2022).    SUSANA JUÁREZ Md., Ph.D  Hematology & Oncology Department  Phone #: [phone]

## 2022-04-20 NOTE — ASSESSMENT & PLAN NOTE
Invasive colonic adenocarcinoma with mucinous features.  Based on report, it appears mass was completely obstructing the sigmoid colon.    Plan;  -obtain CT scan of chest abdomen pelvis with IV contrast to rule out distant disease  -place referral to colorectal surgeon- Dr. Liu for evaluation given the large fungating obstructing mass in the sigmoid colon.    Return in 2-3 weeks to discuss management options.

## 2022-04-21 ENCOUNTER — TELEPHONE (OUTPATIENT)
Dept: HEMATOLOGY/ONCOLOGY | Facility: CLINIC | Age: 72
End: 2022-04-21
Payer: MEDICARE

## 2022-04-21 LAB
CEA SERPL-MCNC: 3.2 NG/ML (ref 0–5)
FERRITIN SERPL-MCNC: 59 NG/ML (ref 20–300)
FOLATE SERPL-MCNC: 6.4 NG/ML (ref 4–24)
IRON SERPL-MCNC: 10 UG/DL (ref 30–160)
SATURATED IRON: 3 % (ref 20–50)
TOTAL IRON BINDING CAPACITY: 324 UG/DL (ref 250–450)
TRANSFERRIN SERPL-MCNC: 219 MG/DL (ref 200–375)
TSH SERPL DL<=0.005 MIU/L-ACNC: 1.71 UIU/ML (ref 0.4–4)
VIT B12 SERPL-MCNC: 1110 PG/ML (ref 210–950)

## 2022-04-21 NOTE — TELEPHONE ENCOUNTER
Patient notified of CT date change to tomorrow 4/22 at the [facility] arrival time at 1250 and NPO for four hours, verbalized understanding.

## 2022-04-22 ENCOUNTER — HOSPITAL ENCOUNTER (OUTPATIENT)
Dept: RADIOLOGY | Facility: HOSPITAL | Age: 72
Discharge: HOME | End: 2022-04-22
Attending: INTERNAL MEDICINE
Payer: MEDICARE

## 2022-04-22 DIAGNOSIS — K63.5 POLYP OF COLON, UNSPECIFIED PART OF COLON, UNSPECIFIED TYPE: ICD-10-CM

## 2022-04-22 PROCEDURE — 25500020 PHARM REV CODE 255: Performed by: INTERNAL MEDICINE

## 2022-04-22 PROCEDURE — 71260 CT CHEST ABDOMEN PELVIS WITH CONTRAST (XPD): ICD-10-PCS | Mod: 26,,, | Performed by: RADIOLOGY

## 2022-04-22 PROCEDURE — A9698 NON-RAD CONTRAST MATERIALNOC: HCPCS | Performed by: INTERNAL MEDICINE

## 2022-04-22 PROCEDURE — 74177 CT CHEST ABDOMEN PELVIS WITH CONTRAST (XPD): ICD-10-PCS | Mod: 26,,, | Performed by: RADIOLOGY

## 2022-04-22 PROCEDURE — 71260 CT THORAX DX C+: CPT | Mod: TC

## 2022-04-22 PROCEDURE — 71260 CT THORAX DX C+: CPT | Mod: 26,,, | Performed by: RADIOLOGY

## 2022-04-22 PROCEDURE — 74177 CT ABD & PELVIS W/CONTRAST: CPT | Mod: TC

## 2022-04-22 PROCEDURE — 74177 CT ABD & PELVIS W/CONTRAST: CPT | Mod: 26,,, | Performed by: RADIOLOGY

## 2022-04-22 RX ADMIN — IOHEXOL 1000 ML: 12 SOLUTION ORAL at 01:04

## 2022-04-22 RX ADMIN — IOHEXOL 75 ML: 350 INJECTION, SOLUTION INTRAVENOUS at 02:04

## 2022-04-25 ENCOUNTER — OFFICE VISIT (OUTPATIENT)
Dept: SURGERY | Facility: CLINIC | Age: 72
End: 2022-04-25
Payer: MEDICARE

## 2022-04-25 ENCOUNTER — PROCEDURE VISIT (OUTPATIENT)
Dept: OBSTETRICS AND GYNECOLOGY | Facility: CLINIC | Age: 72
End: 2022-04-25
Payer: MEDICARE

## 2022-04-25 ENCOUNTER — CLINICAL SUPPORT (OUTPATIENT)
Dept: INTERNAL MEDICINE | Facility: CLINIC | Age: 72
End: 2022-04-25
Payer: MEDICARE

## 2022-04-25 VITALS
DIASTOLIC BLOOD PRESSURE: 80 MMHG | SYSTOLIC BLOOD PRESSURE: 132 MMHG | HEIGHT: 69 IN | BODY MASS INDEX: 26.87 KG/M2 | WEIGHT: 181.44 LBS

## 2022-04-25 VITALS
TEMPERATURE: 98 F | BODY MASS INDEX: 26.44 KG/M2 | HEART RATE: 94 BPM | SYSTOLIC BLOOD PRESSURE: 126 MMHG | WEIGHT: 179 LBS | DIASTOLIC BLOOD PRESSURE: 79 MMHG

## 2022-04-25 DIAGNOSIS — C18.9 MALIGNANT NEOPLASM OF COLON, UNSPECIFIED PART OF COLON: ICD-10-CM

## 2022-04-25 DIAGNOSIS — N95.0 POSTMENOPAUSAL BLEEDING: Primary | ICD-10-CM

## 2022-04-25 DIAGNOSIS — C18.9 COLON CANCER: ICD-10-CM

## 2022-04-25 PROCEDURE — 99212 OFFICE O/P EST SF 10 MIN: CPT | Mod: S$GLB,,, | Performed by: OBSTETRICS & GYNECOLOGY

## 2022-04-25 PROCEDURE — 1159F PR MEDICATION LIST DOCUMENTED IN MEDICAL RECORD: ICD-10-PCS | Mod: CPTII,S$GLB,, | Performed by: COLON & RECTAL SURGERY

## 2022-04-25 PROCEDURE — 99999 PR PBB SHADOW E&M-EST. PATIENT-LVL IV: CPT | Mod: PBBFAC,,, | Performed by: COLON & RECTAL SURGERY

## 2022-04-25 PROCEDURE — 3074F PR MOST RECENT SYSTOLIC BLOOD PRESSURE < 130 MM HG: ICD-10-PCS | Mod: CPTII,S$GLB,, | Performed by: COLON & RECTAL SURGERY

## 2022-04-25 PROCEDURE — 99499 RISK ADDL DX/OHS AUDIT: ICD-10-PCS | Mod: S$GLB,,, | Performed by: COLON & RECTAL SURGERY

## 2022-04-25 PROCEDURE — 1159F MED LIST DOCD IN RCRD: CPT | Mod: CPTII,S$GLB,, | Performed by: COLON & RECTAL SURGERY

## 2022-04-25 PROCEDURE — 99205 PR OFFICE/OUTPT VISIT, NEW, LEVL V, 60-74 MIN: ICD-10-PCS | Mod: S$GLB,,, | Performed by: COLON & RECTAL SURGERY

## 2022-04-25 PROCEDURE — 3074F SYST BP LT 130 MM HG: CPT | Mod: CPTII,S$GLB,, | Performed by: COLON & RECTAL SURGERY

## 2022-04-25 PROCEDURE — 93010 ELECTROCARDIOGRAM REPORT: CPT | Mod: S$GLB,,, | Performed by: INTERNAL MEDICINE

## 2022-04-25 PROCEDURE — 93005 ELECTROCARDIOGRAM TRACING: CPT | Mod: S$GLB,,, | Performed by: COLON & RECTAL SURGERY

## 2022-04-25 PROCEDURE — 3078F PR MOST RECENT DIASTOLIC BLOOD PRESSURE < 80 MM HG: ICD-10-PCS | Mod: CPTII,S$GLB,, | Performed by: COLON & RECTAL SURGERY

## 2022-04-25 PROCEDURE — 99999 PR PBB SHADOW E&M-EST. PATIENT-LVL IV: ICD-10-PCS | Mod: PBBFAC,,, | Performed by: COLON & RECTAL SURGERY

## 2022-04-25 PROCEDURE — 3008F BODY MASS INDEX DOCD: CPT | Mod: CPTII,S$GLB,, | Performed by: COLON & RECTAL SURGERY

## 2022-04-25 PROCEDURE — 3078F DIAST BP <80 MM HG: CPT | Mod: CPTII,S$GLB,, | Performed by: COLON & RECTAL SURGERY

## 2022-04-25 PROCEDURE — 3008F PR BODY MASS INDEX (BMI) DOCUMENTED: ICD-10-PCS | Mod: CPTII,S$GLB,, | Performed by: COLON & RECTAL SURGERY

## 2022-04-25 PROCEDURE — 99205 OFFICE O/P NEW HI 60 MIN: CPT | Mod: S$GLB,,, | Performed by: COLON & RECTAL SURGERY

## 2022-04-25 PROCEDURE — 1126F AMNT PAIN NOTED NONE PRSNT: CPT | Mod: CPTII,S$GLB,, | Performed by: COLON & RECTAL SURGERY

## 2022-04-25 PROCEDURE — 93005 EKG 12-LEAD: ICD-10-PCS | Mod: S$GLB,,, | Performed by: COLON & RECTAL SURGERY

## 2022-04-25 PROCEDURE — 93010 EKG 12-LEAD: ICD-10-PCS | Mod: S$GLB,,, | Performed by: INTERNAL MEDICINE

## 2022-04-25 PROCEDURE — 99212 PR OFFICE/OUTPT VISIT, EST, LEVL II, 10-19 MIN: ICD-10-PCS | Mod: S$GLB,,, | Performed by: OBSTETRICS & GYNECOLOGY

## 2022-04-25 PROCEDURE — 99499 UNLISTED E&M SERVICE: CPT | Mod: S$GLB,,, | Performed by: COLON & RECTAL SURGERY

## 2022-04-25 PROCEDURE — 1126F PR PAIN SEVERITY QUANTIFIED, NO PAIN PRESENT: ICD-10-PCS | Mod: CPTII,S$GLB,, | Performed by: COLON & RECTAL SURGERY

## 2022-04-25 RX ORDER — SODIUM CHLORIDE, SODIUM LACTATE, POTASSIUM CHLORIDE, CALCIUM CHLORIDE 600; 310; 30; 20 MG/100ML; MG/100ML; MG/100ML; MG/100ML
INJECTION, SOLUTION INTRAVENOUS CONTINUOUS
Status: CANCELLED | OUTPATIENT
Start: 2022-04-25

## 2022-04-25 RX ORDER — ACETAMINOPHEN 325 MG/1
1000 TABLET ORAL ONCE
Status: CANCELLED | OUTPATIENT
Start: 2022-04-25 | End: 2022-04-25

## 2022-04-25 RX ORDER — METRONIDAZOLE 500 MG/1
500 TABLET ORAL 3 TIMES DAILY
Qty: 3 TABLET | Refills: 0 | Status: ON HOLD | OUTPATIENT
Start: 2022-04-25 | End: 2022-05-10

## 2022-04-25 RX ORDER — ONDANSETRON 2 MG/ML
4 INJECTION INTRAMUSCULAR; INTRAVENOUS EVERY 12 HOURS PRN
Status: CANCELLED | OUTPATIENT
Start: 2022-04-25

## 2022-04-25 RX ORDER — POLYETHYLENE GLYCOL 3350 17 G/17G
238 POWDER, FOR SOLUTION ORAL DAILY
Qty: 1 EACH | Refills: 0 | Status: ON HOLD | OUTPATIENT
Start: 2022-04-25 | End: 2022-05-10

## 2022-04-25 RX ORDER — NEOMYCIN SULFATE 500 MG/1
1000 TABLET ORAL 3 TIMES DAILY
Qty: 6 TABLET | Refills: 0 | Status: ON HOLD | OUTPATIENT
Start: 2022-04-25 | End: 2022-05-10

## 2022-04-25 RX ORDER — GABAPENTIN 100 MG/1
800 CAPSULE ORAL
Status: CANCELLED | OUTPATIENT
Start: 2022-04-25 | End: 2022-04-25

## 2022-04-25 RX ORDER — HEPARIN SODIUM 5000 [USP'U]/ML
5000 INJECTION, SOLUTION INTRAVENOUS; SUBCUTANEOUS
Status: CANCELLED | OUTPATIENT
Start: 2022-04-25 | End: 2022-04-25

## 2022-04-25 RX ORDER — METRONIDAZOLE 500 MG/100ML
500 INJECTION, SOLUTION INTRAVENOUS
Status: CANCELLED | OUTPATIENT
Start: 2022-04-25

## 2022-04-25 RX ORDER — CELECOXIB 100 MG/1
400 CAPSULE ORAL
Status: CANCELLED | OUTPATIENT
Start: 2022-04-25 | End: 2022-04-25

## 2022-04-25 NOTE — H&P (VIEW-ONLY)
History & Physical    SUBJECTIVE:     History of Present Illness:  Patient is a 71 y.o. female presents with postmenopausal bleeding since 4/2020.  Patient reports intermittent spotting every 3- 4 months.   Pap 3/2022 negative    sono    Uterus:     Size: 7.7 x 3.2 x 5.2 cm     Masses: None.  Cerclage present at the cervix.     Endometrium: Abnormally thickened and mildly heterogeneous in this postmenopausal patient with bleeding measuring 13.6 mm.     Right ovary:     Size: 2.5 x 1.6 x 1.9 cm     Appearance: Normal transabdominal appearance.     Vascular flow: Normal.     Left ovary:     Size: 1.9 x 1.7 x 1.7 cm     Appearance: Normal transabdominal appearance.     Vascular Flow: Normal.     Free Fluid:     Has abdominal cerclage in place  Per NP--cervix somewhat flush against vaginal wall  Chief Complaint   Patient presents with    Procedure     EMB       Review of patient's allergies indicates:  No Known Allergies    Current Outpatient Medications   Medication Sig Dispense Refill    amLODIPine (NORVASC) 5 MG tablet Take 1 tablet (5 mg total) by mouth once daily. 90 tablet 3    cyclobenzaprine (FLEXERIL) 5 MG tablet Take 5 mg by mouth 3 (three) times daily as needed for Muscle spasms.      metroNIDAZOLE (FLAGYL) 500 MG tablet Take 1 tablet (500 mg total) by mouth 3 (three) times daily. Take initial dose@2pm, second dose@3pm and final dose@10pm day before surgery 3 tablet 0    montelukast (SINGULAIR) 10 mg tablet Take 1 tablet (10 mg total) by mouth every evening. 30 tablet 3    neomycin (MYCIFRADIN) 500 mg Tab Take 2 tablets (1,000 mg total) by mouth 3 (three) times daily. Take 1st dose@2pm,take 2nd dose@3pm, take last dose@10pm day before surgery 6 tablet 0    pantoprazole (PROTONIX) 40 MG tablet TAKE 1 TABLET(40 MG) BY MOUTH TWICE DAILY 180 tablet 0    polyethylene glycol (GLYCOLAX) 17 gram/dose powder Take 238 g by mouth once daily. Mix with 2L of gatorade, drink all mixed solution starting@12pm day  "before surgery, finish by 10pm 1 each 0     No current facility-administered medications for this visit.       Past Medical History:   Diagnosis Date    Anemia, unspecified     Fibroids     GERD (gastroesophageal reflux disease)     Hyperlipidemia     Hypertension      Past Surgical History:   Procedure Laterality Date    COLONOSCOPY N/A 3/28/2022    Procedure: COLONOSCOPY;  Surgeon: Melanie Ballard MD;  Location: [facility];  Service: Endoscopy;  Laterality: N/A;    ESOPHAGOGASTRODUODENOSCOPY N/A 3/28/2022    Procedure: ESOPHAGOGASTRODUODENOSCOPY (EGD);  Surgeon: Melanie Ballard MD;  Location: [facility];  Service: Endoscopy;  Laterality: N/A;    HEMORRHOID SURGERY       No family history on file.  Social History     Tobacco Use    Smoking status: Never Smoker    Smokeless tobacco: Never Used   Substance Use Topics    Alcohol use: Yes     Alcohol/week: 2.0 standard drinks     Types: 2 Glasses of wine per week     Comment: occ    Drug use: Never        Review of Systems:  Review of Systems   Genitourinary: Positive for vaginal bleeding.   All other systems reviewed and are negative.      OBJECTIVE:     Vital Signs (Most Recent)  BP: 132/80 (04/25/22 1345)  5' 9" (1.753 m)  82.3 kg (181 lb 7 oz)     Physical Exam:  Physical Exam  Vitals reviewed.   Constitutional:       Appearance: She is well-developed.   HENT:      Head: Normocephalic.   Eyes:      Conjunctiva/sclera: Conjunctivae normal.      Pupils: Pupils are equal, round, and reactive to light.   Cardiovascular:      Rate and Rhythm: Normal rate.   Pulmonary:      Effort: Pulmonary effort is normal.      Breath sounds: Normal breath sounds.   Abdominal:      Palpations: Abdomen is soft.   Musculoskeletal:         General: Normal range of motion.      Cervical back: Normal range of motion.   Skin:     General: Skin is warm and dry.   Neurological:      Mental Status: She is alert and oriented to person, place, and time.   Psychiatric:         " Behavior: Behavior normal.         Thought Content: Thought content normal.         Judgment: Judgment normal.         Laboratory  cbc, bmp to be done preop  covid vaccinated  Diagnostic Results:  US: Reviewed    ASSESSMENT/PLAN:     Encounter Diagnosis   Name Primary?    Postmenopausal bleeding Yes           PLAN:Plan     Reviewed hysteroscopy with d&c possible myosure procedure in detail.  Reviewed risks including but not limited to infection, bleeding, damage to bowel/bladder, cva;htn, dvt, death.   All questions answered to the best of my ability.  Alternatives reviewed --office attempt at emb, hystectomy.  Pt wishes to proceed with hysteroscopy with d&c, possible myosure.   Consents signed witnessed.  Post op course reviewed.  Will coordinate with Dr. Liu; (pt has surgery 5/10/22)

## 2022-04-25 NOTE — H&P (VIEW-ONLY)
History & Physical    SUBJECTIVE:     Chief Complaint   Patient presents with    Colon Cancer   Ref: Melanie Ballard MD    History of Present Illness:  Patient is a 71 y.o. female presents forEvaluation of colonic adenocarcinoma.  Patient was undergoing surveillance colonoscopy in 2022 where she was found to have a almost completely obstructing colon mass that was biopsied and resulted as adenocarcinoma.  This was initially thought to be at the sigmoid colon upon imaging with CT scan of the chest abdomen pelvis, no metastatic disease was found but she was found to have a large fungating mass near the splenic flexure with a normal appearing sigmoid.  She has been asymptomatic but before the colonoscopy in since that time.  She denies any fever, chills, nausea, vomiting, hematochezia, melena or abdominal pain.  She only reports a minor amount of reflux that has since resolved with the addition of pantoprazole.  Her only significant past surgical history is  section via lower midline incision.  She denies any family history of colorectal cancer or IBD.  She has had previous colonic polyps on a colonoscopy around 7 years ago.  She reports she is still having normal bowel movements.    Review of patient's allergies indicates:  No Known Allergies    Current Outpatient Medications   Medication Sig Dispense Refill    amLODIPine (NORVASC) 5 MG tablet Take 1 tablet (5 mg total) by mouth once daily. 90 tablet 3    montelukast (SINGULAIR) 10 mg tablet Take 1 tablet (10 mg total) by mouth every evening. 30 tablet 3    pantoprazole (PROTONIX) 40 MG tablet TAKE 1 TABLET(40 MG) BY MOUTH TWICE DAILY 180 tablet 0    cyclobenzaprine (FLEXERIL) 5 MG tablet Take 5 mg by mouth 3 (three) times daily as needed for Muscle spasms.      metroNIDAZOLE (FLAGYL) 500 MG tablet Take 1 tablet (500 mg total) by mouth 3 (three) times daily. Take initial dose@2pm, second dose@3pm and final dose@10pm day before surgery 3 tablet 0     neomycin (MYCIFRADIN) 500 mg Tab Take 2 tablets (1,000 mg total) by mouth 3 (three) times daily. Take 1st dose@2pm,take 2nd dose@3pm, take last dose@10pm day before surgery 6 tablet 0    polyethylene glycol (GLYCOLAX) 17 gram/dose powder Take 238 g by mouth once daily. Mix with 2L of gatorade, drink all mixed solution starting@12pm day before surgery, finish by 10pm 1 each 0     No current facility-administered medications for this visit.       Past Medical History:   Diagnosis Date    Anemia, unspecified     Fibroids     GERD (gastroesophageal reflux disease)     Hyperlipidemia     Hypertension      Past Surgical History:   Procedure Laterality Date    COLONOSCOPY N/A 3/28/2022    Procedure: COLONOSCOPY;  Surgeon: Melanie Ballard MD;  Location: [facility];  Service: Endoscopy;  Laterality: N/A;    ESOPHAGOGASTRODUODENOSCOPY N/A 3/28/2022    Procedure: ESOPHAGOGASTRODUODENOSCOPY (EGD);  Surgeon: Melanie Ballard MD;  Location: [facility];  Service: Endoscopy;  Laterality: N/A;    HEMORRHOID SURGERY       No family history on file.  Social History     Tobacco Use    Smoking status: Never Smoker    Smokeless tobacco: Never Used   Substance Use Topics    Alcohol use: Yes     Alcohol/week: 2.0 standard drinks     Types: 2 Glasses of wine per week     Comment: occ    Drug use: Never        Review of Systems:  Review of Systems   Constitutional: Negative for activity change, appetite change, chills, fatigue, fever and unexpected weight change.   HENT: Negative for congestion, ear pain, sore throat and trouble swallowing.    Eyes: Negative for pain, redness and itching.   Respiratory: Negative for cough, shortness of breath and wheezing.    Cardiovascular: Negative for chest pain, palpitations and leg swelling.   Gastrointestinal: Negative for abdominal distention, abdominal pain, anal bleeding, blood in stool, constipation, diarrhea, nausea, rectal pain and vomiting.   Endocrine: Negative for cold  intolerance, heat intolerance and polyuria.   Genitourinary: Negative for dysuria, flank pain, frequency and hematuria.   Musculoskeletal: Negative for gait problem, joint swelling and neck pain.   Skin: Negative for color change, rash and wound.   Allergic/Immunologic: Negative for environmental allergies and immunocompromised state.   Neurological: Negative for dizziness, speech difficulty, weakness and numbness.   Psychiatric/Behavioral: Negative for agitation, confusion and hallucinations.       OBJECTIVE:     Vital Signs (Most Recent)  Temp: 97.8 °F (36.6 °C) (04/25/22 0843)  Pulse: 94 (04/25/22 0843)  BP: 126/79 (04/25/22 0843)     81.2 kg (179 lb 0.2 oz)     Physical Exam:  Physical Exam  Constitutional:       Appearance: She is well-developed.   HENT:      Head: Normocephalic and atraumatic.   Eyes:      Conjunctiva/sclera: Conjunctivae normal.   Neck:      Thyroid: No thyromegaly.   Cardiovascular:      Rate and Rhythm: Normal rate and regular rhythm.   Pulmonary:      Effort: Pulmonary effort is normal. No respiratory distress.   Abdominal:      Comments: Soft, nondistended, nontender; +palpable mass in LUQ; well-healed lower midline incision   Musculoskeletal:         General: No tenderness. Normal range of motion.      Cervical back: Normal range of motion.   Skin:     General: Skin is warm and dry.      Capillary Refill: Capillary refill takes less than 2 seconds.      Findings: No rash.   Neurological:      Mental Status: She is alert and oriented to person, place, and time.         Laboratory  Lab Results   Component Value Date    WBC 7.52 04/20/2022    HGB 6.7 (L) 04/20/2022    HCT 22.8 (L) 04/20/2022     (H) 04/20/2022    CHOL 211 (H) 03/08/2022    TRIG 65 03/08/2022    HDL 54 03/08/2022    ALT 8 (L) 04/20/2022    AST 10 04/20/2022     04/20/2022    K 3.8 04/20/2022     04/20/2022    CREATININE 0.8 04/20/2022    BUN 13 04/20/2022    CO2 23 04/20/2022    TSH 1.710 04/20/2022        CEA: 3.2 (April '22)    Diagnostic Results:  CT: Reviewed  Colonoscopy: reviewed    CT:  FINDINGS:  CHEST:     No suspicious nodule or mass within either lung.  No focal consolidation.  No pneumothorax or pleural effusion.  Tracheobronchial tree appears normal.     Thyroid is unremarkable.  No supraclavicular, axillary, mediastinal, or hilar lymphadenopathy.     Thoracic aorta is nonaneurysmal.  Pulmonary arteries are normal in size.  Heart is normal in size.  No pericardial effusion.     ABD/PELVIS:     No focal hepatic lesion.  Spleen and pancreas are normal.  No intrahepatic or extrahepatic biliary ductal dilatation.  Gallbladder is unremarkable.  Portal, hepatic, and superior mesenteric veins are patent.  Right adrenal gland is normal.  Mild nonspecific thickening of the left adrenal gland.     There are few bilateral nonobstructing renal stones measuring up to 3 mm.  No suspicious renal lesion.  No hydronephrosis.  Bladder is normal.     Uterus demonstrates unremarkable appearance.  Nonspecific radiopaque object within the vagina near the cervix.  No adnexal mass.     Multiple diverticula are noted within the colon.  There is a large circumferential mass involving the splenic flexure of the colon indicative of biopsy-proven colon cancer.  There are peripherally enhancing soft tissue deposits extending into the mesenteric fat adjacent to this colonic mass suspicious for extra colonic invasion (series 3, image 65).  One of these peripherally enhancing soft tissue deposits abuts the greater curvature of the stomach with no intervening fat plane suggesting potential for invasion (series 3, image 63).  No extraluminal gas to indicate site of colonic perforation at the site of the large mass.  There is fat stranding within the left upper quadrant and multiple rounded upper abdominal mesenteric nodules/lymph nodes also suspicious for disease spread.  For example, 9 mm rounded mesenteric soft tissue nodule or  node (series 3, image 80).  Multiple rounded upper mesenteric nodules/nodes (series 3, image 72).     Abdominal aorta is nonaneurysmal.  Branch vessels of the abdominal aorta are patent.  Small fat containing umbilical hernia.     No suspicious lytic or blastic osseous lesion.  There is grade 1 anterolisthesis of L4 on L5 and L5 on S1.     Impression:     1.  Large circumferential mass involving the splenic flexure of the colon indicative of biopsy-proven colon cancer.  Multiple peripherally enhancing soft tissue deposits extend into the mesenteric fat adjacent to the colonic mass consistent with extracolonic invasion.  There is also fat stranding within the left upper quadrant as well as multiple rounded upper abdominal mesenteric lymph nodes/nodules suspicious for possible metastatic disease.     2.  No evidence of metastatic disease within the chest.      PATHOLOGY:  3. Sigmoid colon, mass, biopsy:   Invasive adenocarcinoma, with mucinous features.      Immunohistochemistry (IHC) Testing for Mismatch Repair (MMR) Proteins   MLH1   Intact nuclear expression   MSH2   Intact nuclear expression   MSH6   Intact nuclear expression   PMS2   Intact nuclear expression   Background nonneoplastic tissue/internal control with intact nuclear   expression   IHC Interpretation   No loss of nuclear expression of MMR proteins: low probability of   microsatellite instability-high (MSI-H)             ASSESSMENT/PLAN:     70yo F with colonic adenocarcinoma, likely at splenic flexure    - Long  Discussion with the patient regarding her diagnosis of colonic adenocarcinoma.  Discussed that initially this was thought to be a sigmoid colon although CT scan indicates likely at the splenic flexure.  Discussed that with a almost completely obstructing nature of it, as well as apparent on CT scan, I would recommend more urgent surgical resection at this time.  Discussed that this would involve a partial colectomy versus a subtotal colectomy  depending on the exact location and blood supply once we were in the operating room.  She is comfortable with this.  I discussed that I would recommend having surgery in the next week, possibly tube.  However due to family constraints and personal matters, she would like this to be scheduled on May 10th currently.  I cautioned her that this could progress to a completely obstructing tumor and require emergency surgery and she voiced understanding of this.  She will talk this over with family and caused back surgery and up which I encouraged her to do.  -   We also discussed the possibility metastatic disease at the time of surgery and inability to resect the lesion.  Discussed that this would indicate any for an ostomy at that time.  She voiced hesitancy about this and would like to be resected if at all possible.  Discussed that wall we cannot 100 percent assured her that there is no ostomy, we would decide this in the operating room but our plan would be to reconnect at the time of surgery  - plan for open partial left colectomy vs extended right colectomy vs subtotal colectomy on 5/10/22  - All risks, benefits and alternatives fully explained to patient. Risks include, but are not limited to, bleeding, infection, anastomotic leak, damage to ureter, damage to other intra-abdominal organs such as colon, rectum, small bowel, stomach, liver, bladder, reproductive organs, sexual dysfunction, urinary dysfunction, postoperative abscess, conversion to open operation, perioperative MI, CVA and death.  All questions field and appropriately answered to patient's satisfaction.  Consent signed and placed on chart.  - Mechanical and oral antibiotic bowel prep  - ERAS protocol    Maury Liu MD  Colon and Rectal Surgery  [facility]

## 2022-04-25 NOTE — PROGRESS NOTES
History & Physical    SUBJECTIVE:     History of Present Illness:  Patient is a 71 y.o. female presents with postmenopausal bleeding since 4/2020.  Patient reports intermittent spotting every 3- 4 months.   Pap 3/2022 negative    sono    Uterus:     Size: 7.7 x 3.2 x 5.2 cm     Masses: None.  Cerclage present at the cervix.     Endometrium: Abnormally thickened and mildly heterogeneous in this postmenopausal patient with bleeding measuring 13.6 mm.     Right ovary:     Size: 2.5 x 1.6 x 1.9 cm     Appearance: Normal transabdominal appearance.     Vascular flow: Normal.     Left ovary:     Size: 1.9 x 1.7 x 1.7 cm     Appearance: Normal transabdominal appearance.     Vascular Flow: Normal.     Free Fluid:     Has abdominal cerclage in place  Per NP--cervix somewhat flush against vaginal wall  Chief Complaint   Patient presents with    Procedure     EMB       Review of patient's allergies indicates:  No Known Allergies    Current Outpatient Medications   Medication Sig Dispense Refill    amLODIPine (NORVASC) 5 MG tablet Take 1 tablet (5 mg total) by mouth once daily. 90 tablet 3    cyclobenzaprine (FLEXERIL) 5 MG tablet Take 5 mg by mouth 3 (three) times daily as needed for Muscle spasms.      metroNIDAZOLE (FLAGYL) 500 MG tablet Take 1 tablet (500 mg total) by mouth 3 (three) times daily. Take initial dose@2pm, second dose@3pm and final dose@10pm day before surgery 3 tablet 0    montelukast (SINGULAIR) 10 mg tablet Take 1 tablet (10 mg total) by mouth every evening. 30 tablet 3    neomycin (MYCIFRADIN) 500 mg Tab Take 2 tablets (1,000 mg total) by mouth 3 (three) times daily. Take 1st dose@2pm,take 2nd dose@3pm, take last dose@10pm day before surgery 6 tablet 0    pantoprazole (PROTONIX) 40 MG tablet TAKE 1 TABLET(40 MG) BY MOUTH TWICE DAILY 180 tablet 0    polyethylene glycol (GLYCOLAX) 17 gram/dose powder Take 238 g by mouth once daily. Mix with 2L of gatorade, drink all mixed solution starting@12pm day  "before surgery, finish by 10pm 1 each 0     No current facility-administered medications for this visit.       Past Medical History:   Diagnosis Date    Anemia, unspecified     Fibroids     GERD (gastroesophageal reflux disease)     Hyperlipidemia     Hypertension      Past Surgical History:   Procedure Laterality Date    COLONOSCOPY N/A 3/28/2022    Procedure: COLONOSCOPY;  Surgeon: Melanie Ballard MD;  Location: [facility];  Service: Endoscopy;  Laterality: N/A;    ESOPHAGOGASTRODUODENOSCOPY N/A 3/28/2022    Procedure: ESOPHAGOGASTRODUODENOSCOPY (EGD);  Surgeon: Melanie Ballard MD;  Location: [facility];  Service: Endoscopy;  Laterality: N/A;    HEMORRHOID SURGERY       No family history on file.  Social History     Tobacco Use    Smoking status: Never Smoker    Smokeless tobacco: Never Used   Substance Use Topics    Alcohol use: Yes     Alcohol/week: 2.0 standard drinks     Types: 2 Glasses of wine per week     Comment: occ    Drug use: Never        Review of Systems:  Review of Systems   Genitourinary: Positive for vaginal bleeding.   All other systems reviewed and are negative.      OBJECTIVE:     Vital Signs (Most Recent)  BP: 132/80 (04/25/22 1345)  5' 9" (1.753 m)  82.3 kg (181 lb 7 oz)     Physical Exam:  Physical Exam  Vitals reviewed.   Constitutional:       Appearance: She is well-developed.   HENT:      Head: Normocephalic.   Eyes:      Conjunctiva/sclera: Conjunctivae normal.      Pupils: Pupils are equal, round, and reactive to light.   Cardiovascular:      Rate and Rhythm: Normal rate.   Pulmonary:      Effort: Pulmonary effort is normal.      Breath sounds: Normal breath sounds.   Abdominal:      Palpations: Abdomen is soft.   Musculoskeletal:         General: Normal range of motion.      Cervical back: Normal range of motion.   Skin:     General: Skin is warm and dry.   Neurological:      Mental Status: She is alert and oriented to person, place, and time.   Psychiatric:         " Behavior: Behavior normal.         Thought Content: Thought content normal.         Judgment: Judgment normal.         Laboratory  cbc, bmp to be done preop  covid vaccinated  Diagnostic Results:  US: Reviewed    ASSESSMENT/PLAN:     Encounter Diagnosis   Name Primary?    Postmenopausal bleeding Yes           PLAN:Plan     Reviewed hysteroscopy with d&c possible myosure procedure in detail.  Reviewed risks including but not limited to infection, bleeding, damage to bowel/bladder, cva;htn, dvt, death.   All questions answered to the best of my ability.  Alternatives reviewed --office attempt at emb, hystectomy.  Pt wishes to proceed with hysteroscopy with d&c, possible myosure.   Consents signed witnessed.  Post op course reviewed.  Will coordinate with Dr. Liu; (pt has surgery 5/10/22)

## 2022-04-25 NOTE — PROGRESS NOTES
History & Physical    SUBJECTIVE:     Chief Complaint   Patient presents with    Colon Cancer   Ref: Melanie Ballard MD    History of Present Illness:  Patient is a 71 y.o. female presents forEvaluation of colonic adenocarcinoma.  Patient was undergoing surveillance colonoscopy in 2022 where she was found to have a almost completely obstructing colon mass that was biopsied and resulted as adenocarcinoma.  This was initially thought to be at the sigmoid colon upon imaging with CT scan of the chest abdomen pelvis, no metastatic disease was found but she was found to have a large fungating mass near the splenic flexure with a normal appearing sigmoid.  She has been asymptomatic but before the colonoscopy in since that time.  She denies any fever, chills, nausea, vomiting, hematochezia, melena or abdominal pain.  She only reports a minor amount of reflux that has since resolved with the addition of pantoprazole.  Her only significant past surgical history is  section via lower midline incision.  She denies any family history of colorectal cancer or IBD.  She has had previous colonic polyps on a colonoscopy around 7 years ago.  She reports she is still having normal bowel movements.    Review of patient's allergies indicates:  No Known Allergies    Current Outpatient Medications   Medication Sig Dispense Refill    amLODIPine (NORVASC) 5 MG tablet Take 1 tablet (5 mg total) by mouth once daily. 90 tablet 3    montelukast (SINGULAIR) 10 mg tablet Take 1 tablet (10 mg total) by mouth every evening. 30 tablet 3    pantoprazole (PROTONIX) 40 MG tablet TAKE 1 TABLET(40 MG) BY MOUTH TWICE DAILY 180 tablet 0    cyclobenzaprine (FLEXERIL) 5 MG tablet Take 5 mg by mouth 3 (three) times daily as needed for Muscle spasms.      metroNIDAZOLE (FLAGYL) 500 MG tablet Take 1 tablet (500 mg total) by mouth 3 (three) times daily. Take initial dose@2pm, second dose@3pm and final dose@10pm day before surgery 3 tablet 0     neomycin (MYCIFRADIN) 500 mg Tab Take 2 tablets (1,000 mg total) by mouth 3 (three) times daily. Take 1st dose@2pm,take 2nd dose@3pm, take last dose@10pm day before surgery 6 tablet 0    polyethylene glycol (GLYCOLAX) 17 gram/dose powder Take 238 g by mouth once daily. Mix with 2L of gatorade, drink all mixed solution starting@12pm day before surgery, finish by 10pm 1 each 0     No current facility-administered medications for this visit.       Past Medical History:   Diagnosis Date    Anemia, unspecified     Fibroids     GERD (gastroesophageal reflux disease)     Hyperlipidemia     Hypertension      Past Surgical History:   Procedure Laterality Date    COLONOSCOPY N/A 3/28/2022    Procedure: COLONOSCOPY;  Surgeon: Melanie Ballard MD;  Location: [facility];  Service: Endoscopy;  Laterality: N/A;    ESOPHAGOGASTRODUODENOSCOPY N/A 3/28/2022    Procedure: ESOPHAGOGASTRODUODENOSCOPY (EGD);  Surgeon: Melanie Ballard MD;  Location: [facility];  Service: Endoscopy;  Laterality: N/A;    HEMORRHOID SURGERY       No family history on file.  Social History     Tobacco Use    Smoking status: Never Smoker    Smokeless tobacco: Never Used   Substance Use Topics    Alcohol use: Yes     Alcohol/week: 2.0 standard drinks     Types: 2 Glasses of wine per week     Comment: occ    Drug use: Never        Review of Systems:  Review of Systems   Constitutional: Negative for activity change, appetite change, chills, fatigue, fever and unexpected weight change.   HENT: Negative for congestion, ear pain, sore throat and trouble swallowing.    Eyes: Negative for pain, redness and itching.   Respiratory: Negative for cough, shortness of breath and wheezing.    Cardiovascular: Negative for chest pain, palpitations and leg swelling.   Gastrointestinal: Negative for abdominal distention, abdominal pain, anal bleeding, blood in stool, constipation, diarrhea, nausea, rectal pain and vomiting.   Endocrine: Negative for cold  intolerance, heat intolerance and polyuria.   Genitourinary: Negative for dysuria, flank pain, frequency and hematuria.   Musculoskeletal: Negative for gait problem, joint swelling and neck pain.   Skin: Negative for color change, rash and wound.   Allergic/Immunologic: Negative for environmental allergies and immunocompromised state.   Neurological: Negative for dizziness, speech difficulty, weakness and numbness.   Psychiatric/Behavioral: Negative for agitation, confusion and hallucinations.       OBJECTIVE:     Vital Signs (Most Recent)  Temp: 97.8 °F (36.6 °C) (04/25/22 0843)  Pulse: 94 (04/25/22 0843)  BP: 126/79 (04/25/22 0843)     81.2 kg (179 lb 0.2 oz)     Physical Exam:  Physical Exam  Constitutional:       Appearance: She is well-developed.   HENT:      Head: Normocephalic and atraumatic.   Eyes:      Conjunctiva/sclera: Conjunctivae normal.   Neck:      Thyroid: No thyromegaly.   Cardiovascular:      Rate and Rhythm: Normal rate and regular rhythm.   Pulmonary:      Effort: Pulmonary effort is normal. No respiratory distress.   Abdominal:      Comments: Soft, nondistended, nontender; +palpable mass in LUQ; well-healed lower midline incision   Musculoskeletal:         General: No tenderness. Normal range of motion.      Cervical back: Normal range of motion.   Skin:     General: Skin is warm and dry.      Capillary Refill: Capillary refill takes less than 2 seconds.      Findings: No rash.   Neurological:      Mental Status: She is alert and oriented to person, place, and time.         Laboratory  Lab Results   Component Value Date    WBC 7.52 04/20/2022    HGB 6.7 (L) 04/20/2022    HCT 22.8 (L) 04/20/2022     (H) 04/20/2022    CHOL 211 (H) 03/08/2022    TRIG 65 03/08/2022    HDL 54 03/08/2022    ALT 8 (L) 04/20/2022    AST 10 04/20/2022     04/20/2022    K 3.8 04/20/2022     04/20/2022    CREATININE 0.8 04/20/2022    BUN 13 04/20/2022    CO2 23 04/20/2022    TSH 1.710 04/20/2022        CEA: 3.2 (April '22)    Diagnostic Results:  CT: Reviewed  Colonoscopy: reviewed    CT:  FINDINGS:  CHEST:     No suspicious nodule or mass within either lung.  No focal consolidation.  No pneumothorax or pleural effusion.  Tracheobronchial tree appears normal.     Thyroid is unremarkable.  No supraclavicular, axillary, mediastinal, or hilar lymphadenopathy.     Thoracic aorta is nonaneurysmal.  Pulmonary arteries are normal in size.  Heart is normal in size.  No pericardial effusion.     ABD/PELVIS:     No focal hepatic lesion.  Spleen and pancreas are normal.  No intrahepatic or extrahepatic biliary ductal dilatation.  Gallbladder is unremarkable.  Portal, hepatic, and superior mesenteric veins are patent.  Right adrenal gland is normal.  Mild nonspecific thickening of the left adrenal gland.     There are few bilateral nonobstructing renal stones measuring up to 3 mm.  No suspicious renal lesion.  No hydronephrosis.  Bladder is normal.     Uterus demonstrates unremarkable appearance.  Nonspecific radiopaque object within the vagina near the cervix.  No adnexal mass.     Multiple diverticula are noted within the colon.  There is a large circumferential mass involving the splenic flexure of the colon indicative of biopsy-proven colon cancer.  There are peripherally enhancing soft tissue deposits extending into the mesenteric fat adjacent to this colonic mass suspicious for extra colonic invasion (series 3, image 65).  One of these peripherally enhancing soft tissue deposits abuts the greater curvature of the stomach with no intervening fat plane suggesting potential for invasion (series 3, image 63).  No extraluminal gas to indicate site of colonic perforation at the site of the large mass.  There is fat stranding within the left upper quadrant and multiple rounded upper abdominal mesenteric nodules/lymph nodes also suspicious for disease spread.  For example, 9 mm rounded mesenteric soft tissue nodule or  node (series 3, image 80).  Multiple rounded upper mesenteric nodules/nodes (series 3, image 72).     Abdominal aorta is nonaneurysmal.  Branch vessels of the abdominal aorta are patent.  Small fat containing umbilical hernia.     No suspicious lytic or blastic osseous lesion.  There is grade 1 anterolisthesis of L4 on L5 and L5 on S1.     Impression:     1.  Large circumferential mass involving the splenic flexure of the colon indicative of biopsy-proven colon cancer.  Multiple peripherally enhancing soft tissue deposits extend into the mesenteric fat adjacent to the colonic mass consistent with extracolonic invasion.  There is also fat stranding within the left upper quadrant as well as multiple rounded upper abdominal mesenteric lymph nodes/nodules suspicious for possible metastatic disease.     2.  No evidence of metastatic disease within the chest.      PATHOLOGY:  3. Sigmoid colon, mass, biopsy:   Invasive adenocarcinoma, with mucinous features.      Immunohistochemistry (IHC) Testing for Mismatch Repair (MMR) Proteins   MLH1   Intact nuclear expression   MSH2   Intact nuclear expression   MSH6   Intact nuclear expression   PMS2   Intact nuclear expression   Background nonneoplastic tissue/internal control with intact nuclear   expression   IHC Interpretation   No loss of nuclear expression of MMR proteins: low probability of   microsatellite instability-high (MSI-H)             ASSESSMENT/PLAN:     72yo F with colonic adenocarcinoma, likely at splenic flexure    - Long  Discussion with the patient regarding her diagnosis of colonic adenocarcinoma.  Discussed that initially this was thought to be a sigmoid colon although CT scan indicates likely at the splenic flexure.  Discussed that with a almost completely obstructing nature of it, as well as apparent on CT scan, I would recommend more urgent surgical resection at this time.  Discussed that this would involve a partial colectomy versus a subtotal colectomy  depending on the exact location and blood supply once we were in the operating room.  She is comfortable with this.  I discussed that I would recommend having surgery in the next week, possibly tube.  However due to family constraints and personal matters, she would like this to be scheduled on May 10th currently.  I cautioned her that this could progress to a completely obstructing tumor and require emergency surgery and she voiced understanding of this.  She will talk this over with family and caused back surgery and up which I encouraged her to do.  -   We also discussed the possibility metastatic disease at the time of surgery and inability to resect the lesion.  Discussed that this would indicate any for an ostomy at that time.  She voiced hesitancy about this and would like to be resected if at all possible.  Discussed that wall we cannot 100 percent assured her that there is no ostomy, we would decide this in the operating room but our plan would be to reconnect at the time of surgery  - plan for open partial left colectomy vs extended right colectomy vs subtotal colectomy on 5/10/22  - All risks, benefits and alternatives fully explained to patient. Risks include, but are not limited to, bleeding, infection, anastomotic leak, damage to ureter, damage to other intra-abdominal organs such as colon, rectum, small bowel, stomach, liver, bladder, reproductive organs, sexual dysfunction, urinary dysfunction, postoperative abscess, conversion to open operation, perioperative MI, CVA and death.  All questions field and appropriately answered to patient's satisfaction.  Consent signed and placed on chart.  - Mechanical and oral antibiotic bowel prep  - ERAS protocol    Maury Liu MD  Colon and Rectal Surgery  [facility]

## 2022-04-26 ENCOUNTER — PATIENT MESSAGE (OUTPATIENT)
Dept: ADMINISTRATIVE | Facility: HOSPITAL | Age: 72
End: 2022-04-26
Payer: MEDICARE

## 2022-04-26 LAB — METHYLMALONATE SERPL-SCNC: 0.14 UMOL/L

## 2022-04-27 ENCOUNTER — TELEPHONE (OUTPATIENT)
Dept: SURGERY | Facility: CLINIC | Age: 72
End: 2022-04-27
Payer: MEDICARE

## 2022-04-27 NOTE — TELEPHONE ENCOUNTER
"After speaking to patient the following message was sent to Dr Lui:    "Sx 5/10 Colectomy - Patient wants to know can she move it up to 5/5 - You currently only have 1 Sx scheduled 5/5 Lap (R) Amaury Colectomy."  "

## 2022-04-29 ENCOUNTER — TELEPHONE (OUTPATIENT)
Dept: SURGERY | Facility: CLINIC | Age: 72
End: 2022-04-29
Payer: MEDICARE

## 2022-04-29 NOTE — TELEPHONE ENCOUNTER
Dr Liu advised patient has changed her mind about wanting to move Sx to 5/5 - Decided to keep it on 5/10    ----- Message -----  From: Kim Chicas LPN  Sent: 4/27/2022   3:41 PM CDT  To: Maury Liu MD  Subject: RE: Sx                                           Changed her mind and wants to keep it on 5/10    ----- Message -----  From: Maury Liu MD  Sent: 4/27/2022   3:15 PM CDT  To: Kim Chicas LPN  Subject: RE: Sx                                           Sure    ----- Message -----  From: Kim Chicas LPN  Sent: 4/27/2022   2:00 PM CDT  To: Maury Liu MD  Subject: Sx                                               Sx 5/10 Colectomy - Patient wants to know can she move it up to 5/5 - You currently only have 1 Sx scheduled 5/5 Lap (R) Amaury Colectomy.

## 2022-05-05 ENCOUNTER — TELEPHONE (OUTPATIENT)
Dept: SURGERY | Facility: CLINIC | Age: 72
End: 2022-05-05
Payer: MEDICARE

## 2022-05-05 ENCOUNTER — TELEPHONE (OUTPATIENT)
Dept: PREADMISSION TESTING | Facility: HOSPITAL | Age: 72
End: 2022-05-05
Payer: MEDICARE

## 2022-05-05 DIAGNOSIS — Z01.818 PREOP TESTING: Primary | ICD-10-CM

## 2022-05-05 NOTE — TELEPHONE ENCOUNTER
Called pt to review surgery instructions for 5/10/22. Pt also aware that she needs type and screen lab done on 5/9/22 at the hospital. Patient verbalized understanding.

## 2022-05-05 NOTE — PRE ADMISSION SCREENING
Pre op instructions reviewed with pt per phone: Spoke about the following, Patient verbalized understanding.    To confirm, Your Surgery is scheduled on 5/10/22, please arrive at 0630am.    Please report to the Main Hospital (1st Floor) at [facility] off of [facility] (2nd building on the left, in front of the flag pole).  [address] [address], [place].  We will call you the afternoon prior to surgery to confirm arrival time, as it is subject to change due to cancellations & emergencies.        INSTRUCTIONS IMPORTANT!!!  Nothing By Mouth after 12 midnight! NO WATER after midnight! OK to brush teeth, no gum, candy or mints!    May have light breakfast day before surgery, but ONLY CLEAR LIQUIDS starting at 12 Noon. NO SOLID FOOD AFTER STARTING BOWEL PREP!  Take antibiotics day before surgery. Start bowel prep day before surgery at 12pm, finish by 10pm.       metronidazole 500 mg Oral 3 times daily, Take initial dose@2pm, second dose@3pm and final dose@10pm day before surgery     neomycin sulfate 1,000 mg Oral 3 times daily, Take 1st dose@2pm,take 2nd dose@3pm, take last dose@10pm day before surgery     polyethylene glycol 3350 238 g Oral Daily, Mix with 2L of Gatorade, drink all mixed solution starting@12pm day before surgery, finish by 10pm         *Take only these medicines with a small swallow of water-morning of surgery.  Amlodipine      ____  NO Acrylic/fake nails worn day of surgery (hand/arm surgeries)  ____  NO powder, lotions, deodorants or creams to surgical area.  ____  Please remove all jewelry, including piercings and leave at home.  ____  Dentures, Hearing Aids and Contact Lens will need to be removed prior to the start of surgery.  ____  Please bring photo ID and insurance information to hospital (Leave Valuables at Home)  ____  If going home the same day, arrange for a ride home. You will not be able to             drive if Anesthesia was used.   ____  Wear clean, loose fitting clothing. Allow  for dressings, bandages.  ____  Stop all Aspirin products, Ibuprofen, Motrin, Advil and Aleve at least 5-7 days before surgery, unless otherwise instructed by your doctor, or the nurse.   ____ Blood Thinners are stopped based on your Provider's recommendation; Patients need to call their Surgeon regarding when to stop/hold.  ____ Stop taking any Fish Oil supplements or Vitamins at least 5 days prior to surgery, unless instructed otherwise by your Doctor.    Diabetic Patients: If you take Diabetic medication, do NOT take morning of surgery unless instructed by             Doctor. Metformin to be stopped 24 hrs prior to surgery time. DO NOT take long-acting insulin the evening before surgery. Blood sugars will be checked in pre-op morning of.          Bathing Instructions: The night before surgery and the morning prior to coming to the hospital:   -Do not shave your face or body the day before or the day of surgery!   -Shower & Rinse your body as usual with anti-bacterial Soap (Dial, Lever 2000, or Hibiclens)   -Do not use Hibiclens on your head, face, or genitals.   -Do not wash with anti-bacterial soap after you use the Hibiclens.   -Rinse your body thoroughly.      Discharge Instructions: will be given to you by the Nurse discharging you home. Please call your Surgeon's office with any post-surgery questions/concerns/medications.    [facility] Visitor/Ride Policy:  Only 2 adults allowed (over the age of 18) to accompany you into Surgery Dept. All other visitors will need to wait in waiting room. Must have a ride home from a responsible adult that you know and trust. Medical Transport, Uber or Lyft can only be used if patient has a responsible adult to accompany them during ride home.      >Call Surgeon office/on-call Surgeon if you experience any of these signs & symptoms of infection.  *Please Call [facility] Pre-Admissions Department with surgery instruction questions at [phone] or [phone].  *If you are  running late for surgery, please call Surgery dept at [phone].  *Insurance/Financial Questions, please call [phone].

## 2022-05-05 NOTE — TELEPHONE ENCOUNTER
Message sent to Dr Liu via Secure Chat (Pre Admitted attached to message)      ----- Message from Nancy Hussein RN sent at 5/5/2022 10:03 AM CDT -----  Regarding: sx pt  Good morning, I see that this surgery patient has a very low HBG & HCT level of 6.7 and 22 done on 4/20/22. Her surgery is on 5/10/22. Would you like her to have a type and screen done? If so, when? Thanks in advance.    -Pre Admit Testing Dept.

## 2022-05-09 ENCOUNTER — TELEPHONE (OUTPATIENT)
Dept: PREADMISSION TESTING | Facility: HOSPITAL | Age: 72
End: 2022-05-09
Payer: MEDICARE

## 2022-05-09 ENCOUNTER — ANESTHESIA EVENT (OUTPATIENT)
Dept: SURGERY | Facility: HOSPITAL | Age: 72
DRG: 328 | End: 2022-05-09
Payer: MEDICARE

## 2022-05-09 PROCEDURE — 86920 COMPATIBILITY TEST SPIN: CPT | Performed by: COLON & RECTAL SURGERY

## 2022-05-09 NOTE — TELEPHONE ENCOUNTER
Called and spoke with pt about the following:    Please arrive to [facility] main lobby at 0630am on 5/10/22 for your scheduled procedure. NOTHING to eat or drink after midnight, except medications instructed by the Pre-admit Nurse. Continue bowel prep today per Dr Liu instructions. Patient  verbalized understanding.

## 2022-05-09 NOTE — ANESTHESIA PREPROCEDURE EVALUATION
05/10/2022  Ambar Duenas is a 71 y.o., female.      Pre-op Assessment    I have reviewed the Patient Summary Reports.     I have reviewed the Nursing Notes.       Review of Systems  Anesthesia Hx:  No problems with previous Anesthesia    EENT/Dental:EENT/Dental Normal   Cardiovascular:   Exercise tolerance: good Hypertension    Pulmonary:  Pulmonary Normal    Hepatic/GI:   GERD    Neurological:  Neurology Normal        Physical Exam  General: Well nourished    Airway:  Mallampati: II   Mouth Opening: Normal  TM Distance: Normal  Neck ROM: Normal ROM    Dental:  Intact        Anesthesia Plan  Type of Anesthesia, risks & benefits discussed:      Intra-op Monitoring Plan: Standard ASA Monitors  Post Op Pain Control Plan: IV/PO Opioids PRN  Induction:  IV  Informed Consent: Informed consent signed with the Patient and all parties understand the risks and agree with anesthesia plan.  All questions answered. Patient consented to blood products? Yes  ASA Score: 2  Day of Surgery Review of History & Physical: H&P Update referred to the surgeon/provider.I have interviewed and examined the patient. I have reviewed the patient's H&P dated: There are no significant changes.     Ready For Surgery From Anesthesia Perspective.     .                                                                                                                 05/09/2022  Ambar Duenas is a 71 y.o., female.      Pre-op Assessment          Review of Systems         Anesthesia Plan  Type of Anesthesia, risks & benefits discussed:    Anesthesia Type: Gen ETT  Intra-op Monitoring Plan: Standard ASA Monitors  Post Op Pain Control Plan: IV/PO Opioids PRN  Induction:  IV  Airway Plan: Direct  Informed Consent: Informed consent signed with the Patient and all parties understand the risks and agree with anesthesia plan.  All questions  answered.   ASA Score: 3    Ready For Surgery From Anesthesia Perspective.     Past Medical History:   Diagnosis Date    Anemia, unspecified     Fibroids     GERD (gastroesophageal reflux disease)     Hyperlipidemia     Hypertension        Past Surgical History:   Procedure Laterality Date    COLONOSCOPY N/A 3/28/2022    Procedure: COLONOSCOPY;  Surgeon: Melanie Ballard MD;  Location: [facility];  Service: Endoscopy;  Laterality: N/A;    ESOPHAGOGASTRODUODENOSCOPY N/A 3/28/2022    Procedure: ESOPHAGOGASTRODUODENOSCOPY (EGD);  Surgeon: Melanie Ballard MD;  Location: [facility];  Service: Endoscopy;  Laterality: N/A;    HEMORRHOID SURGERY         History reviewed. No pertinent family history.    Social History     Socioeconomic History    Marital status:    Tobacco Use    Smoking status: Never Smoker    Smokeless tobacco: Never Used   Substance and Sexual Activity    Alcohol use: Yes     Alcohol/week: 2.0 standard drinks     Types: 2 Glasses of wine per week     Comment: occ    Drug use: Never    Sexual activity: Not Currently     Partners: Male     Birth control/protection: Post-menopausal       No current facility-administered medications for this encounter.     Current Outpatient Medications   Medication Sig Dispense Refill    amLODIPine (NORVASC) 5 MG tablet Take 1 tablet (5 mg total) by mouth once daily. 90 tablet 3    montelukast (SINGULAIR) 10 mg tablet Take 1 tablet (10 mg total) by mouth every evening. 30 tablet 3    pantoprazole (PROTONIX) 40 MG tablet TAKE 1 TABLET(40 MG) BY MOUTH TWICE DAILY 180 tablet 0    cyclobenzaprine (FLEXERIL) 5 MG tablet Take 5 mg by mouth 3 (three) times daily as needed for Muscle spasms.      metroNIDAZOLE (FLAGYL) 500 MG tablet Take 1 tablet (500 mg total) by mouth 3 (three) times daily. Take initial dose@2pm, second dose@3pm and final dose@10pm day before surgery 3 tablet 0    neomycin (MYCIFRADIN) 500 mg Tab Take 2 tablets (1,000 mg total) by  mouth 3 (three) times daily. Take 1st dose@2pm,take 2nd dose@3pm, take last dose@10pm day before surgery 6 tablet 0    polyethylene glycol (GLYCOLAX) 17 gram/dose powder Take 238 g by mouth once daily. Mix with 2L of gatorade, drink all mixed solution starting@12pm day before surgery, finish by 10pm 1 each 0     Lab Results   Component Value Date    WBC 5.78 05/09/2022    HGB 7.4 (L) 05/09/2022    HCT 26.1 (L) 05/09/2022    MCV 69 (L) 05/09/2022     05/09/2022           Review of patient's allergies indicates:  No Known Allergies

## 2022-05-10 ENCOUNTER — ANESTHESIA (OUTPATIENT)
Dept: SURGERY | Facility: HOSPITAL | Age: 72
DRG: 328 | End: 2022-05-10
Payer: MEDICARE

## 2022-05-10 ENCOUNTER — TELEPHONE (OUTPATIENT)
Dept: UROLOGY | Facility: CLINIC | Age: 72
End: 2022-05-10
Payer: MEDICARE

## 2022-05-10 ENCOUNTER — HOSPITAL ENCOUNTER (INPATIENT)
Facility: HOSPITAL | Age: 72
LOS: 5 days | Discharge: HOME | DRG: 328 | End: 2022-05-15
Attending: COLON & RECTAL SURGERY | Admitting: COLON & RECTAL SURGERY
Payer: MEDICARE

## 2022-05-10 DIAGNOSIS — C18.9 MALIGNANT NEOPLASM OF COLON, UNSPECIFIED PART OF COLON: ICD-10-CM

## 2022-05-10 DIAGNOSIS — N95.0 POSTMENOPAUSAL BLEEDING: ICD-10-CM

## 2022-05-10 PROCEDURE — 63600175 PHARM REV CODE 636 W HCPCS: Performed by: COLON & RECTAL SURGERY

## 2022-05-10 PROCEDURE — 25000003 PHARM REV CODE 250: Performed by: OBSTETRICS & GYNECOLOGY

## 2022-05-10 PROCEDURE — 25000003 PHARM REV CODE 250: Performed by: COLON & RECTAL SURGERY

## 2022-05-10 PROCEDURE — 71000039 HC RECOVERY, EACH ADD'L HOUR: Performed by: COLON & RECTAL SURGERY

## 2022-05-10 PROCEDURE — 88305 TISSUE EXAM BY PATHOLOGIST: CPT | Performed by: PATHOLOGY

## 2022-05-10 PROCEDURE — 52000 CYSTOURETHROSCOPY: CPT | Mod: ,,, | Performed by: UROLOGY

## 2022-05-10 PROCEDURE — 48120 REMOVAL OF PANCREAS LESION: CPT | Mod: 51,,, | Performed by: COLON & RECTAL SURGERY

## 2022-05-10 PROCEDURE — 63600175 PHARM REV CODE 636 W HCPCS: Performed by: ANESTHESIOLOGY

## 2022-05-10 PROCEDURE — 88305 TISSUE EXAM BY PATHOLOGIST: CPT | Mod: 26,,, | Performed by: PATHOLOGY

## 2022-05-10 PROCEDURE — 88307 TISSUE EXAM BY PATHOLOGIST: CPT | Mod: 26,,, | Performed by: PATHOLOGY

## 2022-05-10 PROCEDURE — 47100 PR WEDGE BIOPSY OF LIVER: ICD-10-PCS | Mod: 51,,, | Performed by: COLON & RECTAL SURGERY

## 2022-05-10 PROCEDURE — C1729 CATH, DRAINAGE: HCPCS | Performed by: COLON & RECTAL SURGERY

## 2022-05-10 PROCEDURE — 58558 HYSTEROSCOPY BIOPSY: CPT | Mod: ,,, | Performed by: OBSTETRICS & GYNECOLOGY

## 2022-05-10 PROCEDURE — 88309 PR  SURG PATH,LEVEL VI: ICD-10-PCS | Mod: 26,,, | Performed by: PATHOLOGY

## 2022-05-10 PROCEDURE — 49905 PR OMENTAL FLAP,INTRA-ABDOMINAL: ICD-10-PCS | Mod: ,,, | Performed by: COLON & RECTAL SURGERY

## 2022-05-10 PROCEDURE — 36000709 HC OR TIME LEV III EA ADD 15 MIN: Performed by: COLON & RECTAL SURGERY

## 2022-05-10 PROCEDURE — 11000001 HC ACUTE MED/SURG PRIVATE ROOM

## 2022-05-10 PROCEDURE — 25000003 PHARM REV CODE 250: Performed by: NURSE ANESTHETIST, CERTIFIED REGISTERED

## 2022-05-10 PROCEDURE — 36000708 HC OR TIME LEV III 1ST 15 MIN: Performed by: COLON & RECTAL SURGERY

## 2022-05-10 PROCEDURE — C9290 INJ, BUPIVACAINE LIPOSOME: HCPCS | Performed by: COLON & RECTAL SURGERY

## 2022-05-10 PROCEDURE — S0030 INJECTION, METRONIDAZOLE: HCPCS | Performed by: COLON & RECTAL SURGERY

## 2022-05-10 PROCEDURE — 88305 TISSUE EXAM BY PATHOLOGIST: ICD-10-PCS | Mod: 26,,, | Performed by: PATHOLOGY

## 2022-05-10 PROCEDURE — 47100 WEDGE BIOPSY OF LIVER: CPT | Mod: 51,,, | Performed by: COLON & RECTAL SURGERY

## 2022-05-10 PROCEDURE — 52000 PR CYSTOURETHROSCOPY: ICD-10-PCS | Mod: ,,, | Performed by: UROLOGY

## 2022-05-10 PROCEDURE — 88309 TISSUE EXAM BY PATHOLOGIST: CPT | Mod: 59 | Performed by: PATHOLOGY

## 2022-05-10 PROCEDURE — 48120 PR EXCISION OF PANCREAS LESION: ICD-10-PCS | Mod: 51,,, | Performed by: COLON & RECTAL SURGERY

## 2022-05-10 PROCEDURE — 99900035 HC TECH TIME PER 15 MIN (STAT)

## 2022-05-10 PROCEDURE — 88307 TISSUE EXAM BY PATHOLOGIST: CPT | Performed by: PATHOLOGY

## 2022-05-10 PROCEDURE — 88305 TISSUE EXAM BY PATHOLOGIST: CPT | Mod: 59 | Performed by: PATHOLOGY

## 2022-05-10 PROCEDURE — 37000008 HC ANESTHESIA 1ST 15 MINUTES: Performed by: COLON & RECTAL SURGERY

## 2022-05-10 PROCEDURE — 88307 PR  SURG PATH,LEVEL V: ICD-10-PCS | Mod: 26,,, | Performed by: PATHOLOGY

## 2022-05-10 PROCEDURE — 49905 OMENTAL FLAP INTRA-ABDOM: CPT | Mod: ,,, | Performed by: COLON & RECTAL SURGERY

## 2022-05-10 PROCEDURE — 94761 N-INVAS EAR/PLS OXIMETRY MLT: CPT

## 2022-05-10 PROCEDURE — 63600175 PHARM REV CODE 636 W HCPCS: Performed by: NURSE ANESTHETIST, CERTIFIED REGISTERED

## 2022-05-10 PROCEDURE — 44160 PR REMVL COLON & TERM ILEUM W/ILEOCOLOSTOMY: ICD-10-PCS | Mod: 22,,, | Performed by: COLON & RECTAL SURGERY

## 2022-05-10 PROCEDURE — 37000009 HC ANESTHESIA EA ADD 15 MINS: Performed by: COLON & RECTAL SURGERY

## 2022-05-10 PROCEDURE — 71000033 HC RECOVERY, INTIAL HOUR: Performed by: COLON & RECTAL SURGERY

## 2022-05-10 PROCEDURE — 43611 EXCISION OF STOMACH LESION: CPT | Mod: 51,,, | Performed by: COLON & RECTAL SURGERY

## 2022-05-10 PROCEDURE — 27201423 OPTIME MED/SURG SUP & DEVICES STERILE SUPPLY: Performed by: COLON & RECTAL SURGERY

## 2022-05-10 PROCEDURE — 43611 PR EXCIS STOAMCH MALIG: ICD-10-PCS | Mod: 51,,, | Performed by: COLON & RECTAL SURGERY

## 2022-05-10 PROCEDURE — 58558 PR HYSTEROSCOPY,W/ENDO BX: ICD-10-PCS | Mod: ,,, | Performed by: OBSTETRICS & GYNECOLOGY

## 2022-05-10 PROCEDURE — 44160 REMOVAL OF COLON: CPT | Mod: 22,,, | Performed by: COLON & RECTAL SURGERY

## 2022-05-10 PROCEDURE — 88309 TISSUE EXAM BY PATHOLOGIST: CPT | Mod: 26,,, | Performed by: PATHOLOGY

## 2022-05-10 RX ORDER — PHENYLEPHRINE HYDROCHLORIDE 10 MG/ML
INJECTION INTRAVENOUS
Status: DISCONTINUED | OUTPATIENT
Start: 2022-05-10 | End: 2022-05-10

## 2022-05-10 RX ORDER — DEXAMETHASONE SODIUM PHOSPHATE 4 MG/ML
INJECTION, SOLUTION INTRA-ARTICULAR; INTRALESIONAL; INTRAMUSCULAR; INTRAVENOUS; SOFT TISSUE
Status: DISCONTINUED | OUTPATIENT
Start: 2022-05-10 | End: 2022-05-10

## 2022-05-10 RX ORDER — ACETAMINOPHEN 500 MG
1000 TABLET ORAL ONCE
Status: COMPLETED | OUTPATIENT
Start: 2022-05-10 | End: 2022-05-10

## 2022-05-10 RX ORDER — SODIUM CHLORIDE, SODIUM LACTATE, POTASSIUM CHLORIDE, CALCIUM CHLORIDE 600; 310; 30; 20 MG/100ML; MG/100ML; MG/100ML; MG/100ML
INJECTION, SOLUTION INTRAVENOUS CONTINUOUS
Status: DISCONTINUED | OUTPATIENT
Start: 2022-05-10 | End: 2022-05-11

## 2022-05-10 RX ORDER — ONDANSETRON 2 MG/ML
INJECTION INTRAMUSCULAR; INTRAVENOUS
Status: DISCONTINUED | OUTPATIENT
Start: 2022-05-10 | End: 2022-05-10

## 2022-05-10 RX ORDER — LIDOCAINE HYDROCHLORIDE 20 MG/ML
INJECTION, SOLUTION EPIDURAL; INFILTRATION; INTRACAUDAL; PERINEURAL
Status: DISCONTINUED | OUTPATIENT
Start: 2022-05-10 | End: 2022-05-10

## 2022-05-10 RX ORDER — SODIUM CHLORIDE 0.9 % (FLUSH) 0.9 %
10 SYRINGE (ML) INJECTION
Status: DISCONTINUED | OUTPATIENT
Start: 2022-05-10 | End: 2022-05-15 | Stop reason: HOSPADM

## 2022-05-10 RX ORDER — NALOXONE HCL 0.4 MG/ML
0.02 VIAL (ML) INJECTION
Status: DISCONTINUED | OUTPATIENT
Start: 2022-05-10 | End: 2022-05-15 | Stop reason: HOSPADM

## 2022-05-10 RX ORDER — PROPOFOL 10 MG/ML
VIAL (ML) INTRAVENOUS
Status: DISCONTINUED | OUTPATIENT
Start: 2022-05-10 | End: 2022-05-10

## 2022-05-10 RX ORDER — ONDANSETRON 2 MG/ML
4 INJECTION INTRAMUSCULAR; INTRAVENOUS EVERY 12 HOURS PRN
Status: DISCONTINUED | OUTPATIENT
Start: 2022-05-10 | End: 2022-05-10 | Stop reason: HOSPADM

## 2022-05-10 RX ORDER — CHLORHEXIDINE GLUCONATE ORAL RINSE 1.2 MG/ML
10 SOLUTION DENTAL
Status: DISCONTINUED | OUTPATIENT
Start: 2022-05-10 | End: 2022-05-10

## 2022-05-10 RX ORDER — SODIUM CHLORIDE, SODIUM LACTATE, POTASSIUM CHLORIDE, CALCIUM CHLORIDE 600; 310; 30; 20 MG/100ML; MG/100ML; MG/100ML; MG/100ML
INJECTION, SOLUTION INTRAVENOUS
Status: DISCONTINUED | OUTPATIENT
Start: 2022-05-10 | End: 2022-05-13

## 2022-05-10 RX ORDER — SUCCINYLCHOLINE CHLORIDE 20 MG/ML
INJECTION INTRAMUSCULAR; INTRAVENOUS
Status: DISCONTINUED | OUTPATIENT
Start: 2022-05-10 | End: 2022-05-10

## 2022-05-10 RX ORDER — HYDROCODONE BITARTRATE AND ACETAMINOPHEN 500; 5 MG/1; MG/1
TABLET ORAL
Status: DISCONTINUED | OUTPATIENT
Start: 2022-05-10 | End: 2022-05-10 | Stop reason: HOSPADM

## 2022-05-10 RX ORDER — HYDRALAZINE HYDROCHLORIDE 20 MG/ML
10 INJECTION INTRAMUSCULAR; INTRAVENOUS
Status: DISCONTINUED | OUTPATIENT
Start: 2022-05-10 | End: 2022-05-15 | Stop reason: HOSPADM

## 2022-05-10 RX ORDER — METOPROLOL TARTRATE 1 MG/ML
5 INJECTION, SOLUTION INTRAVENOUS
Status: DISCONTINUED | OUTPATIENT
Start: 2022-05-10 | End: 2022-05-15 | Stop reason: HOSPADM

## 2022-05-10 RX ORDER — CHLORHEXIDINE GLUCONATE ORAL RINSE 1.2 MG/ML
10 SOLUTION DENTAL 2 TIMES DAILY
Status: COMPLETED | OUTPATIENT
Start: 2022-05-10 | End: 2022-05-15

## 2022-05-10 RX ORDER — ROCURONIUM BROMIDE 10 MG/ML
INJECTION, SOLUTION INTRAVENOUS
Status: DISCONTINUED | OUTPATIENT
Start: 2022-05-10 | End: 2022-05-10

## 2022-05-10 RX ORDER — GABAPENTIN 400 MG/1
800 CAPSULE ORAL
Status: COMPLETED | OUTPATIENT
Start: 2022-05-10 | End: 2022-05-10

## 2022-05-10 RX ORDER — CELECOXIB 100 MG/1
400 CAPSULE ORAL
Status: COMPLETED | OUTPATIENT
Start: 2022-05-10 | End: 2022-05-10

## 2022-05-10 RX ORDER — METRONIDAZOLE 500 MG/100ML
500 INJECTION, SOLUTION INTRAVENOUS
Status: COMPLETED | OUTPATIENT
Start: 2022-05-10 | End: 2022-05-10

## 2022-05-10 RX ORDER — HYDROMORPHONE HYDROCHLORIDE 2 MG/ML
0.2 INJECTION, SOLUTION INTRAMUSCULAR; INTRAVENOUS; SUBCUTANEOUS EVERY 5 MIN PRN
Status: DISCONTINUED | OUTPATIENT
Start: 2022-05-10 | End: 2022-05-10 | Stop reason: HOSPADM

## 2022-05-10 RX ORDER — HEPARIN SODIUM 5000 [USP'U]/ML
5000 INJECTION, SOLUTION INTRAVENOUS; SUBCUTANEOUS
Status: DISCONTINUED | OUTPATIENT
Start: 2022-05-10 | End: 2022-05-10 | Stop reason: HOSPADM

## 2022-05-10 RX ORDER — DIPHENHYDRAMINE HYDROCHLORIDE 50 MG/ML
12.5 INJECTION INTRAMUSCULAR; INTRAVENOUS EVERY 6 HOURS PRN
Status: DISCONTINUED | OUTPATIENT
Start: 2022-05-10 | End: 2022-05-15 | Stop reason: HOSPADM

## 2022-05-10 RX ORDER — ACETAMINOPHEN 10 MG/ML
1000 INJECTION, SOLUTION INTRAVENOUS EVERY 8 HOURS
Status: COMPLETED | OUTPATIENT
Start: 2022-05-10 | End: 2022-05-11

## 2022-05-10 RX ORDER — SODIUM CHLORIDE 9 MG/ML
INJECTION, SOLUTION INTRAVENOUS CONTINUOUS
Status: DISCONTINUED | OUTPATIENT
Start: 2022-05-10 | End: 2022-05-10

## 2022-05-10 RX ORDER — FENTANYL CITRATE 50 UG/ML
INJECTION, SOLUTION INTRAMUSCULAR; INTRAVENOUS
Status: DISCONTINUED | OUTPATIENT
Start: 2022-05-10 | End: 2022-05-10

## 2022-05-10 RX ORDER — BUPIVACAINE HYDROCHLORIDE 2.5 MG/ML
INJECTION, SOLUTION EPIDURAL; INFILTRATION; INTRACAUDAL
Status: DISCONTINUED | OUTPATIENT
Start: 2022-05-10 | End: 2022-05-10 | Stop reason: HOSPADM

## 2022-05-10 RX ORDER — MORPHINE SULFATE 1 MG/ML
INJECTION INTRAVENOUS CONTINUOUS
Status: DISCONTINUED | OUTPATIENT
Start: 2022-05-10 | End: 2022-05-13

## 2022-05-10 RX ORDER — OXYCODONE AND ACETAMINOPHEN 5; 325 MG/1; MG/1
1 TABLET ORAL
Status: DISCONTINUED | OUTPATIENT
Start: 2022-05-10 | End: 2022-05-10 | Stop reason: HOSPADM

## 2022-05-10 RX ORDER — ONDANSETRON 2 MG/ML
4 INJECTION INTRAMUSCULAR; INTRAVENOUS EVERY 6 HOURS PRN
Status: DISCONTINUED | OUTPATIENT
Start: 2022-05-10 | End: 2022-05-15 | Stop reason: HOSPADM

## 2022-05-10 RX ORDER — FAMOTIDINE 10 MG/ML
20 INJECTION INTRAVENOUS EVERY 12 HOURS
Status: DISCONTINUED | OUTPATIENT
Start: 2022-05-10 | End: 2022-05-14

## 2022-05-10 RX ORDER — SODIUM CHLORIDE, SODIUM LACTATE, POTASSIUM CHLORIDE, CALCIUM CHLORIDE 600; 310; 30; 20 MG/100ML; MG/100ML; MG/100ML; MG/100ML
INJECTION, SOLUTION INTRAVENOUS CONTINUOUS
Status: DISCONTINUED | OUTPATIENT
Start: 2022-05-10 | End: 2022-05-10

## 2022-05-10 RX ADMIN — SODIUM CHLORIDE, SODIUM LACTATE, POTASSIUM CHLORIDE, AND CALCIUM CHLORIDE: .6; .31; .03; .02 INJECTION, SOLUTION INTRAVENOUS at 02:05

## 2022-05-10 RX ADMIN — LIDOCAINE HYDROCHLORIDE 50 MG: 20 INJECTION, SOLUTION EPIDURAL; INFILTRATION; INTRACAUDAL; PERINEURAL at 08:05

## 2022-05-10 RX ADMIN — PROPOFOL 140 MG: 10 INJECTION, EMULSION INTRAVENOUS at 08:05

## 2022-05-10 RX ADMIN — ROCURONIUM BROMIDE 45 MG: 10 INJECTION, SOLUTION INTRAVENOUS at 09:05

## 2022-05-10 RX ADMIN — DEXAMETHASONE SODIUM PHOSPHATE 4 MG: 4 INJECTION, SOLUTION INTRAMUSCULAR; INTRAVENOUS at 10:05

## 2022-05-10 RX ADMIN — FENTANYL CITRATE 50 MCG: 50 INJECTION, SOLUTION INTRAMUSCULAR; INTRAVENOUS at 09:05

## 2022-05-10 RX ADMIN — METRONIDAZOLE 500 MG: 500 SOLUTION INTRAVENOUS at 09:05

## 2022-05-10 RX ADMIN — GABAPENTIN 800 MG: 400 CAPSULE ORAL at 08:05

## 2022-05-10 RX ADMIN — ONDANSETRON 4 MG: 2 INJECTION, SOLUTION INTRAMUSCULAR; INTRAVENOUS at 10:05

## 2022-05-10 RX ADMIN — MORPHINE SULFATE: 1 INJECTION INTRAVENOUS at 02:05

## 2022-05-10 RX ADMIN — PHENYLEPHRINE HYDROCHLORIDE 100 MCG: 10 INJECTION INTRAVENOUS at 10:05

## 2022-05-10 RX ADMIN — SODIUM CHLORIDE, SODIUM LACTATE, POTASSIUM CHLORIDE, AND CALCIUM CHLORIDE: .6; .31; .03; .02 INJECTION, SOLUTION INTRAVENOUS at 08:05

## 2022-05-10 RX ADMIN — ROCURONIUM BROMIDE 5 MG: 10 INJECTION, SOLUTION INTRAVENOUS at 08:05

## 2022-05-10 RX ADMIN — ACETAMINOPHEN 1000 MG: 10 INJECTION INTRAVENOUS at 10:05

## 2022-05-10 RX ADMIN — HYDROMORPHONE HYDROCHLORIDE 0.2 MG: 2 INJECTION INTRAMUSCULAR; INTRAVENOUS; SUBCUTANEOUS at 02:05

## 2022-05-10 RX ADMIN — CEFTRIAXONE 2 G: 2 INJECTION, SOLUTION INTRAVENOUS at 08:05

## 2022-05-10 RX ADMIN — ACETAMINOPHEN 1000 MG: 10 INJECTION INTRAVENOUS at 02:05

## 2022-05-10 RX ADMIN — FAMOTIDINE 20 MG: 10 INJECTION INTRAVENOUS at 09:05

## 2022-05-10 RX ADMIN — SODIUM CHLORIDE, SODIUM LACTATE, POTASSIUM CHLORIDE, AND CALCIUM CHLORIDE: .6; .31; .03; .02 INJECTION, SOLUTION INTRAVENOUS at 10:05

## 2022-05-10 RX ADMIN — SUGAMMADEX 200 MG: 100 INJECTION, SOLUTION INTRAVENOUS at 12:05

## 2022-05-10 RX ADMIN — SUCCINYLCHOLINE CHLORIDE 100 MG: 20 INJECTION, SOLUTION INTRAMUSCULAR; INTRAVENOUS at 08:05

## 2022-05-10 RX ADMIN — CELECOXIB 400 MG: 100 CAPSULE ORAL at 08:05

## 2022-05-10 RX ADMIN — CHLORHEXIDINE GLUCONATE 0.12% ORAL RINSE 10 ML: 1.2 LIQUID ORAL at 09:05

## 2022-05-10 RX ADMIN — CHLORHEXIDINE GLUCONATE 0.12% ORAL RINSE 10 ML: 1.2 LIQUID ORAL at 08:05

## 2022-05-10 RX ADMIN — ROCURONIUM BROMIDE 30 MG: 10 INJECTION, SOLUTION INTRAVENOUS at 09:05

## 2022-05-10 RX ADMIN — ROCURONIUM BROMIDE 10 MG: 10 INJECTION, SOLUTION INTRAVENOUS at 11:05

## 2022-05-10 RX ADMIN — FENTANYL CITRATE 50 MCG: 50 INJECTION, SOLUTION INTRAMUSCULAR; INTRAVENOUS at 08:05

## 2022-05-10 RX ADMIN — ACETAMINOPHEN 1000 MG: 500 TABLET ORAL at 08:05

## 2022-05-10 NOTE — ANESTHESIA PROCEDURE NOTES
Intubation    Date/Time: 5/10/2022 8:50 AM  Performed by: James Mathews CRNA  Authorized by: Grzegorz Milan MD     Intubation:     Induction:  Intravenous    Intubated:  Postinduction    Attempts:  1    Attempted By:  CRNA    Method of Intubation:  Direct    Blade:  David 2    Laryngeal View Grade: Grade I - full view of cords      Difficult Airway Encountered?: No      Complications:  None    Airway Device:  Oral endotracheal tube    Airway Device Size:  7.5    Style/Cuff Inflation:  Cuffed (inflated to minimal occlusive pressure)    Tube secured:  21    Secured at:  The lips    Placement Verified By:  Capnometry    Complicating Factors:  None    Findings Post-Intubation:  BS equal bilateral

## 2022-05-10 NOTE — TRANSFER OF CARE
"Anesthesia Transfer of Care Note    Patient: Ambar Duenas    Procedure(s) Performed: Procedure(s) (LRB):  COLECTOMY, PARTIAL (Left)  HYSTEROSCOPY, WITH DILATION AND CURETTAGE OF UTERUS (N/A)  BLOCK, TRANSVERSUS ABDOMINIS PLANE (N/A)  GASTRECTOMY, PARTIAL (N/A)  OMENTECTOMY  CYSTOSCOPY (N/A)  BIOPSY, LIVER    Patient location: PACU    Anesthesia Type: general    Transport from OR: Transported from OR on room air with adequate spontaneous ventilation    Post pain: adequate analgesia    Post assessment: no apparent anesthetic complications    Post vital signs: stable    Level of consciousness: responds to stimulation    Nausea/Vomiting: no nausea/vomiting    Complications: none    Transfer of care protocol was followed      Last vitals:   Visit Vitals  BP (!) 142/87 (BP Location: Right arm, Patient Position: Sitting)   Temp 36.8 °C (98.2 °F) (Temporal)   Resp 18   Ht 5' 9" (1.753 m)   Wt 81.4 kg (179 lb 7.3 oz)   LMP 06/03/2019   SpO2 100%   Breastfeeding No   BMI 26.50 kg/m²     "

## 2022-05-10 NOTE — OP NOTE
Date of Procedure: 05/10/2022    PREOP DIAGNOSIS:  Gross hematuria    POSTOP DIAGNOSIS:  Gross hematuria    PROCEDURES:      Cystoscopy under anesthesia    SURGEON:  Pasha Billingsley M.D.    Assistants: None    Specimen:  None    ANESTHESIA:  General endotracheal.    BLOOD LOSS:  None.    FINDINGS:  Posterior floor had some mucosal irritation, no fistula or perforation identified, no active bleeding.  Well away from the ureteral orifices.      PROCEDURE IN DETAIL:  We were contacted intraoperatively due to gross hematuria following hysteroscopy and colectomy.  Intra-abdominal examination demonstrates some ecchymosis to the posterior bladder wall, but no evidence of perforation with bladder insufflated with saline through the Bingham catheter.  Therefore no definitive perforation.  No evidence of fistulization to the bowel.  Patient was already sterilely prepped and draped, Bingham catheter was removed and a cystoscope was inserted, proximally 21 Martiniqu[place].  Bladder was otherwise unremarkable no evidence of intravesical lesions or other abnormalities.  Bilateral ureteral orifices were normal in size, shape, caliber and location.  At the midportion of the trigone there appeared to be some mucosal irritation with possible mucosal tear, no definitive muscle identified, but no evidence of perforation, intact bladder and it held insufflation was saline.  No other signs or sources of the hematuria.  Would recommend keeping the Bingham catheter for approximately 5 days and then Bingham catheter voiding trial.  No evidence of perforation though, will have her return in 6 weeks at which point will complete hematuria workup.    COMPLICATIONS: None

## 2022-05-10 NOTE — INTERVAL H&P NOTE
The patient has been examined and the H&P has been reviewed:    I concur with the findings and no changes have occurred since H&P was written.  She is ready to proceed with dilation and curettage, possible hysteroscopy.    Surgery risks, benefits and alternative options discussed and understood by patient/family.          There are no hospital problems to display for this patient.

## 2022-05-10 NOTE — ANESTHESIA POSTPROCEDURE EVALUATION
Anesthesia Post Evaluation    Patient: Ambar Duenas    Procedure(s) Performed: Procedure(s) (LRB):  COLECTOMY, PARTIAL (Left)  HYSTEROSCOPY, WITH DILATION AND CURETTAGE OF UTERUS (N/A)  BLOCK, TRANSVERSUS ABDOMINIS PLANE (N/A)  GASTRECTOMY, PARTIAL (N/A)  OMENTECTOMY  CYSTOSCOPY (N/A)  BIOPSY, LIVER    Final Anesthesia Type: general      Patient location during evaluation: PACU  Patient participation: Yes- Able to Participate  Level of consciousness: awake and alert  Post-procedure vital signs: reviewed and stable  Pain management: adequate  Airway patency: patent  JAI mitigation strategies: Verification of full reversal of neuromuscular block  PONV status at discharge: No PONV  Anesthetic complications: no      Cardiovascular status: hemodynamically stable  Respiratory status: spontaneous ventilation  Hydration status: euvolemic  Follow-up not needed.          Vitals Value Taken Time   /64 05/10/22 1454   Temp 36.7 °C (98 °F) 05/10/22 1454   Pulse 78 05/10/22 1455   Resp 16 05/10/22 1455   SpO2 97 % 05/10/22 1455         Event Time   Out of Recovery 14:35:00         Pain/Terrence Score: Pain Rating Prior to Med Admin: 4 (5/10/2022  2:46 PM)  Terrence Score: 9 (5/10/2022  2:00 PM)

## 2022-05-10 NOTE — OP NOTE
[facility] Surgery ([facility])  Surgery Department  Operative Note    SUMMARY     Date of Procedure: 5/10/2022     Procedure:  1. Open subtotal colectomy (right, transverse, partial left)   2. Partial pancreatectomy  3. Partial gastrectomy  4. Partial omentectomy  5. Liver biopsy  6. Omental pedicle flap  7. Transversus abdominis plane block    Surgeon(s) and Role:  Panel 1:     * Maury Liu MD - Primary    Assisting Surgeon: None    Pre-Operative Diagnosis: Malignant neoplasm of colon, unspecified part of colon [C18.9]    Post-Operative Diagnosis: Post-Op Diagnosis Codes:     * Malignant neoplasm of colon, unspecified part of colon [C18.9]    Anesthesia: General    Technical Procedures Used:   1. Open subtotal colectomy (right, transverse, partial left)   2. Partial pancreatectomy  3. Partial gastrectomy  4. Partial omentectomy  5. Liver biopsy  6. Omental pedicle flap  7. Transversus abdominis plane block    Indications for Procedure:  71-year-old female with an obstructing transverse colon/splenic flexure mass without evidence of metastatic disease who presents for definitive surgical management    Findings of the Procedure:  Obstructing large distal transverse colon mass with attachments to the greater curvature of the stomach and possibly the inferior border of the pancreas amenable to primary resection with subtotal colectomy.  Small discolored area in the left lateral inferior border of the liver amenable to liver biopsy to rule out metastatic disease.    Description of the Procedure:  Patient was brought to the operating room and placed supine on the table.  General endotracheal anesthesia was then induced.  A Bingham catheter was then sterilely placed by nursing.  The patient was moved to lithotomy position.  The 1st portion of the case was turned over to OBGYN for a hysteroscopy and D&C.  Please see their separately dictated op report for full details.    Once the case was turned over to us, there was  noted to be some blood in the Bingham which was initially clear.  Therefore the decision was made to re-consult OBGYN and Urology intraoperatively for evaluation.  The decision was made to intraoperatively and intra-abdominally evaluate the bladder once we were open which we did a later time.    The rectal washout was performed.  The abdomen was then prepped and draped usual sterile fashion.  A preoperative surgical time-out performed confirming the correct patient, procedure and preop medications given.  A midline incision was made above the umbilicus and dissection was carried down to level of the fascia.  The fascia was sharply incised now was entered sharply under direct visualization.  There is no evidence of injury upon entry.  The incision was then extended superiorly towards the xiphoid process and inferiorly below the umbilicus.  A wound protector was placed after taking down some small adhesions of the omentum to the anterior abdominal wall.    Immediately upon entering the abdominal cavity, there was an obvious mass in the left upper quadrant in the distal transverse colon.  This appeared to be tethered to the stomach as well as underlying retroperitoneal structures possibly.  The spleen appeared to be free of this disease.  There was no definitive evidence of metastatic disease at this time.  Given the location of the tumor in the distal transverse colon with some tethering to the descending colon, decision was made to perform a subtotal colectomy with an ileo descending anastomosis eventually.    Attention was turned to the right side of the colon.  The right colon was then mobilized off its lateral attachments along the white line of Toldt a lateral to medial dissection.  This was done with care taken to protect the underlying retroperitoneal structures including the kidney, ureter and duodenum.  Once the will right side had been fully mobilized, attention was turned to the ileocolic pedicle.  The  ileocolic pedicle was dissected around its takeoff from the SMA with care taken to protect the underlying duodenum.  The pedicle was then taken using the EnSeal device.  A point on the terminal ileum just past the ileocecal valve was chosen a mesenteric window was made beneath this location.  The terminal ileum was then stapled using a LAVERN 75 blue load stapler.  The remaining mesentery between this staple line and the ileocolic pedicle was then taken using the EnSeal device.    Once this had been completely taken, attention was turned to the transverse colon.  The omentum was peeled off of the proximal transverse colon all way to the level of the mass in the distal transverse colon.  The lesser sac was entered.  The colon was  from the retroperitoneal structures.  Once it was at the location of the middle colic trunk, this was where there was some attachments to the stomach as well as the possible retroperitoneal structures.    A window was made beneath the stomach on either side of the greater curvature was attached to this mass.  The stomach was then stapled using LAVERN 75 green load staplers requiring 2 firings of this to transect a portion of the stomach attached to this mass to achieve a healthy resection margin.  Once this was completed for the partial gastrectomy, it was ensured that the NG tube was not caught in the staple line.  The staple line was also oversewn with a 2-0 Vicryl suture to assist with hemostasis.    Attention was then turned to the distal transverse colon.  The lesser sac was entered above this and the splenic flexure was fully mobilized by taking down the gastrocolic, splenocolic and phrenocolic ligaments.  The dissection was carried along the white line of Toldt along the descending colon to where there was some tethering to the mass as well.  A point on the mid descending colon was chosen as the point of transection given the tethering of the more proximal descending colon.  A  mesenteric window was made beneath this location and a LAVERN 75 blue load stapler was used to transect the descending colon at this location.    Attention was turned back to the mesenteric attachments and transverse colon mass.  With the stomach retracted out of the way and visualization, the middle colic trunk was then taken using the EnSeal device with care to protect the surrounding structures.  There was some possible attachments of the colonic mesentery to the retroperitoneum including the inferior border of the pancreas.  There was a small sliver of the pancreas that was also taken at this location on the inferior border attached to the specimen to achieve a healthy margin using the electrocautery.  This transected area on the inferior border the pancreas was then oversewn with 3-0 Vicryl sutures to assist with any leakage.  Once this was completed, the remaining mesentery between the middle colic and the cut end of the descending colon was taken by ensuring that the ascending branch of the left colic was taken.  Once this was completed, the omentum was divided proximally on the left upper quadrant to fully free the mass, the transverse, the right colon and the descending colon from all its attachments.  The specimen was then passed off the field final pathology as a subtotal colectomy with attached omentum, stomach and pancreas.    The rest of the abdomen was checked and found to be completely hemostatic.  Attention was turned to the liver where the undersurface of the liver on the left most lateral side did have an area of nodularity that was white.  Although this was likely not a metastatic lesion, decision made to biopsy to completely rule out metastatic disease.  There was some small cysts also in liver but these were left alone as there were obvious cyst.  The area on the undersurface of the liver on the left most lateral side was then grasped with a clamp and was excised using electrocautery for the liver  biopsy and sent for final pathology.  The area was then made hemostatic with electrocautery.    Attention was then turned to the omentum which had been mobilized off of the transverse colon.  An area distal on the omentum appeared to be slightly dusky and the dissection was carried back to the bile area the omentum and this was taken with the EnSeal device for the omentectomy which was sent for final pathology.  This was the area that was partially stuck to the area of dissection as well.    The abdomen was then copiously irrigated found to be hemostatic.  The stapled end of the terminal ileum was brought over to the mobilized area of the descending colon.  A side-to-side isoperistaltic anastomosis was then constructed by taking off the corner of the staple line of the descending colon and making an anti mesenteric border enterotomy on the small intestine.  A LAVERN 75 blue load was used to construct the common anastomosis and a TA 60 blue load was used to close the common enterotomy.  This final staple line was then oversewn with a 2-0 Vicryl suture to assist with hemostasis.  A 3-0 Vicryl sutures used to place a crotch stitch.  Anastomosis was widely patent and hemostatic.  There was no tension upon the anastomosis.    The omentum that was remaining was then placed over the pancreatic resection site and the gastrectomy site sutured in place with 3-0 Vicryl sutures for an omental pedicle flap as well as over top of the anastomosis to buttress the anastomosis with 3-0 Vicryl sutures as well.  This also placed tissue between the anastomosis and the pancreas.  A 15 [place] JENI drain was then brought through the abdominal wall on the right side and placed within the pancreatic and stomach resection bed where the tumor was previously located.  It was sutured into place with a 2-0 silk suture.    The wound protector was removed.  Attention was turned to the pelvis.  The bladder was filled irrigation and found to have some  staining of blood within it.  Therefore Urology was intraoperatively consulted.  They performed a cystoscopy and found a very small superficial bladder injury.  Please see their separately dictated op report for full details.  They replaced the Bingham catheter.    Once Urology turned the case back over to us, a transverse abdominis plane block was then performed using mixture of 20 cc of Exparel, 30 cc of 0.25% bupivacaine plain and 10 cc of injectable saline.  30 cc injected bilaterally into the transversus abdominis plane under direct visualization for total 60 cc given for the block.  The abdomen was again checked and found to be hemostatic.  All irrigation was suctioned out.  The NG tube was then checked and found to be within the stomach and taped in the place by Anesthesia.  The fascia of the midline incision was then closed using running #1 looped PDS suture.  The wound was then copiously irrigated made hemostatic.  The skin was then loosely stapled shut using a skin stapler.  Betadine-soaked Telfa mirza were then placed.  Surgical dressings were applied.  The patient was moved back in the supine position.  She was woken from general endotracheal anesthesia and taken to the postanesthesia care in stable condition.  She tolerated procedure well.  All sponge, needle instrument counts were correct at the end of the case.      Significant Surgical Tasks Conducted by the Assistant(s), if Applicable: N/A    Complications: No    Estimated Blood Loss (EBL): 50 mL           Implants: * No implants in log *    Specimens:   Specimen (24h ago, onward)             Start     Ordered    05/10/22 5697  Specimen to Pathology, Surgery General Surgery  Once        Comments: Pre-op Diagnosis: Malignant neoplasm of colon, unspecified part of colon [C18.9]Procedure(s):COLECTOMY, PARTIALHYSTEROSCOPY, WITH DILATION AND CURETTAGE OF UTERUSBLOCK, TRANSVERSUS ABDOMINIS PLANEGASTRECTOMY, PARTIAL Number of specimens: 4Name of specimens:  1.  Right transverse and descending colon-stitch marks stomach margin (PERM)2. Liver biopsy to rule out metastatic disease PERM3. Anastomosis PERM4. Omentum PERM     References:    Click here for ordering Quick Tip   Question Answer Comment   Procedure Type: General Surgery    Specimen Class: Known or suspected malignancy    Which provider would you like to cc? RYANN WESTFALL    Release to patient Immediate        05/10/22 1247    05/10/22 0923  Specimen to Pathology, Surgery Gynecology and Obstetrics  Once        Comments: Pre-op Diagnosis: Malignant neoplasm of colon, unspecified part of colon [C18.9]Procedure(s):COLECTOMY, PARTIAL LEFT VS SUBTOTAl vs EXTENDED RIGHTHYSTEROSCOPY, WITH DILATION AND CURETTAGE OF UTERUS Number of specimens: 1Name of specimens: 1) endometrial curetting PERM     References:    Click here for ordering Quick Tip   Question Answer Comment   Procedure Type: Gynecology and Obstetrics    Specimen Class: Routine/Screening    Which provider would you like to cc? OKSANA MELÉNDEZ    Release to patient Immediate        05/10/22 0923                        Condition: Good    Disposition: PACU - hemodynamically stable.    Attestation: I performed the procedure.

## 2022-05-10 NOTE — TELEPHONE ENCOUNTER
Called pt to see where she wanted her 6 week appt with Dr. Billingsley to be. Left message asking her to call us back.    ----- Message from Pasha Billingsley MD sent at 5/10/2022 12:53 PM CDT -----  Six weeks with me

## 2022-05-10 NOTE — OP NOTE
[facility] - Surgery ([facility])  General Surgery  Operative Note    SUMMARY     Date of Procedure: 5/10/2022     Procedure:   Hysteroscopy with dilation and curettage    Surgeon(s) and Role:    Panel 2:     * Madelyn West MD - Primary    Assisting Surgeon: None    Pre-Operative Diagnosis:Post menopausal bleeding  Post-Operative Diagnosis: Post-Op Diagnosis Codes:     Post menopausal bleeding    Anesthesia: General    Operative Findings (including complications, if any): cervix flush with vaginal wall; steontic cervical os,     Description of Technical Procedures:   The patient was taken to the Operating Room where general       endotracheal anesthesia was induced and found to be adequate.  Her perineum was then prepped and draped in normal sterile fashion, and bladder was drained in an in and out fashion with the red rubber catheter.   Time out was performed.                                                    A weighted sterile speculum was  then placed in the patient's vagina, and the anterior lip of the cervix  was grasped with single-tooth tenaculum.  The uterine cervix was then gently  dilated up to 8 mm using a Hegar dilator.  The uterus was sounded to 7 cm.  A 0 degree hysteroscope was advanced through the cervix to the uterine fundus using normal saline as the distension medium.  It appeared that a false passage was created.  Reattempt to find the cavity with the uterine sound or hysterscope or dilators was felt to be unsucessful.  Curettings were obtained after the hysterscope were removed. The endometrial curettings were sent to pathology for permanent section.    The patient had minimal  bleeding at the end of the case. The single-tooth tenaculum was removed from the anterior lip of the cervix and excellent hemostasis was noted.  The weighted speculum was then removed.   Sponge, lap and needle counts were correct  X 2.    Significant Surgical Tasks Conducted by the Assistant(s), if Applicable:  n/a    Estimated Blood Loss (EBL): * No values recorded between 5/10/2022  9:08 AM and 5/10/2022  9:41 AM 20 cc           Implants: * No implants in log *    Specimens:   Specimen (24h ago, onward)             Start     Ordered    05/10/22 0923  Specimen to Pathology, Surgery Gynecology and Obstetrics  Once        Comments: Pre-op Diagnosis: Malignant neoplasm of colon, unspecified part of colon [C18.9]Procedure(s):COLECTOMY, PARTIAL LEFT VS SUBTOTAl vs EXTENDED RIGHTHYSTEROSCOPY, WITH DILATION AND CURETTAGE OF UTERUS Number of specimens: 1Name of specimens: 1) endometrial curetting PERM     References:    Click here for ordering Quick Tip   Question Answer Comment   Procedure Type: Gynecology and Obstetrics    Specimen Class: Routine/Screening    Which provider would you like to cc? OKSANA MELÉNDEZ    Release to patient Immediate        05/10/22 0923                        Condition: Good      Attestation: I performed the procedure.

## 2022-05-10 NOTE — PLAN OF CARE
Pt remains free of falls/injury this shift. Safety precautions maintained. Pain managed with PCA pump. NG tube in place. VSS. No signs and symptoms of acute distress noted at this time. 12 hour chart check completed. Will continue to monitor.

## 2022-05-11 PROBLEM — I10 PRIMARY HYPERTENSION: Status: ACTIVE | Noted: 2022-05-11

## 2022-05-11 LAB
ANION GAP SERPL CALC-SCNC: 9 MMOL/L (ref 8–16)
BASOPHILS # BLD AUTO: 0.02 K/UL (ref 0–0.2)
BASOPHILS NFR BLD: 0.2 % (ref 0–1.9)
BUN SERPL-MCNC: 18 MG/DL (ref 8–23)
CALCIUM SERPL-MCNC: 8.8 MG/DL (ref 8.7–10.5)
CHLORIDE SERPL-SCNC: 107 MMOL/L (ref 95–110)
CO2 SERPL-SCNC: 24 MMOL/L (ref 23–29)
CREAT SERPL-MCNC: 1 MG/DL (ref 0.5–1.4)
DIFFERENTIAL METHOD: ABNORMAL
EOSINOPHIL # BLD AUTO: 0 K/UL (ref 0–0.5)
EOSINOPHIL NFR BLD: 0 % (ref 0–8)
ERYTHROCYTE [DISTWIDTH] IN BLOOD BY AUTOMATED COUNT: 23.2 % (ref 11.5–14.5)
EST. GFR  (AFRICAN AMERICAN): >60 ML/MIN/1.73 M^2
EST. GFR  (NON AFRICAN AMERICAN): 58 ML/MIN/1.73 M^2
GLUCOSE SERPL-MCNC: 117 MG/DL (ref 70–110)
HCT VFR BLD AUTO: 25.5 % (ref 37–48.5)
HGB BLD-MCNC: 7.2 G/DL (ref 12–16)
IMM GRANULOCYTES # BLD AUTO: 0.05 K/UL (ref 0–0.04)
IMM GRANULOCYTES NFR BLD AUTO: 0.4 % (ref 0–0.5)
LYMPHOCYTES # BLD AUTO: 1.8 K/UL (ref 1–4.8)
LYMPHOCYTES NFR BLD: 14.9 % (ref 18–48)
MAGNESIUM SERPL-MCNC: 1.5 MG/DL (ref 1.6–2.6)
MCH RBC QN AUTO: 20.1 PG (ref 27–31)
MCHC RBC AUTO-ENTMCNC: 28.2 G/DL (ref 32–36)
MCV RBC AUTO: 71 FL (ref 82–98)
MONOCYTES # BLD AUTO: 0.9 K/UL (ref 0.3–1)
MONOCYTES NFR BLD: 7.9 % (ref 4–15)
NEUTROPHILS # BLD AUTO: 9.1 K/UL (ref 1.8–7.7)
NEUTROPHILS NFR BLD: 76.6 % (ref 38–73)
NRBC BLD-RTO: 0 /100 WBC
PHOSPHATE SERPL-MCNC: 5.1 MG/DL (ref 2.7–4.5)
PLATELET # BLD AUTO: 388 K/UL (ref 150–450)
PMV BLD AUTO: 9 FL (ref 9.2–12.9)
POTASSIUM SERPL-SCNC: 4.3 MMOL/L (ref 3.5–5.1)
RBC # BLD AUTO: 3.59 M/UL (ref 4–5.4)
SODIUM SERPL-SCNC: 140 MMOL/L (ref 136–145)
WBC # BLD AUTO: 11.82 K/UL (ref 3.9–12.7)

## 2022-05-11 PROCEDURE — 99024 PR POST-OP FOLLOW-UP VISIT: ICD-10-PCS | Mod: ,,, | Performed by: COLON & RECTAL SURGERY

## 2022-05-11 PROCEDURE — 94761 N-INVAS EAR/PLS OXIMETRY MLT: CPT

## 2022-05-11 PROCEDURE — 80048 BASIC METABOLIC PNL TOTAL CA: CPT | Performed by: COLON & RECTAL SURGERY

## 2022-05-11 PROCEDURE — 85025 COMPLETE CBC W/AUTO DIFF WBC: CPT | Performed by: COLON & RECTAL SURGERY

## 2022-05-11 PROCEDURE — S5010 5% DEXTROSE AND 0.45% SALINE: HCPCS | Performed by: COLON & RECTAL SURGERY

## 2022-05-11 PROCEDURE — 94799 UNLISTED PULMONARY SVC/PX: CPT

## 2022-05-11 PROCEDURE — 99024 POSTOP FOLLOW-UP VISIT: CPT | Mod: ,,, | Performed by: COLON & RECTAL SURGERY

## 2022-05-11 PROCEDURE — 11000001 HC ACUTE MED/SURG PRIVATE ROOM

## 2022-05-11 PROCEDURE — 84100 ASSAY OF PHOSPHORUS: CPT | Performed by: COLON & RECTAL SURGERY

## 2022-05-11 PROCEDURE — 63600175 PHARM REV CODE 636 W HCPCS: Performed by: COLON & RECTAL SURGERY

## 2022-05-11 PROCEDURE — 83735 ASSAY OF MAGNESIUM: CPT | Performed by: COLON & RECTAL SURGERY

## 2022-05-11 PROCEDURE — 25000003 PHARM REV CODE 250: Performed by: COLON & RECTAL SURGERY

## 2022-05-11 PROCEDURE — 36415 COLL VENOUS BLD VENIPUNCTURE: CPT | Performed by: COLON & RECTAL SURGERY

## 2022-05-11 RX ORDER — DEXTROSE MONOHYDRATE AND SODIUM CHLORIDE 5; .45 G/100ML; G/100ML
INJECTION, SOLUTION INTRAVENOUS CONTINUOUS
Status: DISCONTINUED | OUTPATIENT
Start: 2022-05-11 | End: 2022-05-15 | Stop reason: HOSPADM

## 2022-05-11 RX ADMIN — CHLORHEXIDINE GLUCONATE 0.12% ORAL RINSE 10 ML: 1.2 LIQUID ORAL at 09:05

## 2022-05-11 RX ADMIN — FAMOTIDINE 20 MG: 10 INJECTION INTRAVENOUS at 09:05

## 2022-05-11 RX ADMIN — ACETAMINOPHEN 1000 MG: 10 INJECTION INTRAVENOUS at 05:05

## 2022-05-11 RX ADMIN — DEXTROSE AND SODIUM CHLORIDE: 5; .45 INJECTION, SOLUTION INTRAVENOUS at 03:05

## 2022-05-11 NOTE — SUBJECTIVE & OBJECTIVE
Interval History: Doing well postop. No nausea or vomiting. On PCA for pain control. Poor ambulation thus far. No BM/flatus.    Medications:  Continuous Infusions:   lactated ringers 75 mL/hr at 05/10/22 1824    morphine       Scheduled Meds:   chlorhexidine  10 mL Mouth/Throat BID    famotidine (PF)  20 mg Intravenous Q12H     PRN Meds:diphenhydrAMINE, hydrALAZINE, lactated ringers, metoprolol, naloxone, ondansetron, sodium chloride 0.9%, sodium chloride 0.9%     Review of patient's allergies indicates:  No Known Allergies  Objective:     Vital Signs (Most Recent):  Temp: 98.2 °F (36.8 °C) (05/11/22 1121)  Pulse: 75 (05/11/22 1121)  Resp: 17 (05/11/22 1121)  BP: 138/62 (05/11/22 1121)  SpO2: 96 % (05/11/22 1121) Vital Signs (24h Range):  Temp:  [97.8 °F (36.6 °C)-98.9 °F (37.2 °C)] 98.2 °F (36.8 °C)  Pulse:  [68-88] 75  Resp:  [13-24] 17  SpO2:  [95 %-100 %] 96 %  BP: (109-138)/(58-76) 138/62     Weight: 82.1 kg (181 lb)  Body mass index is 26.73 kg/m².    Intake/Output - Last 3 Shifts         05/09 0700  05/10 0659 05/10 0700 05/11 0659 05/11 0700 05/12 0659    P.O.   0    I.V. (mL/kg)  387.5 (4.7)     IV Piggyback  1800     Total Intake(mL/kg)  2187.5 (26.6) 0 (0)    Urine (mL/kg/hr)  1200     Drains  115     Blood  50     Total Output  1365     Net  +822.5 0                   Physical Exam  Constitutional:       Appearance: She is well-developed.   HENT:      Head: Normocephalic and atraumatic.      Nose:      Comments: NGT in place with bilious output in cannister  Eyes:      Conjunctiva/sclera: Conjunctivae normal.   Neck:      Thyroid: No thyromegaly.   Cardiovascular:      Rate and Rhythm: Normal rate and regular rhythm.   Pulmonary:      Effort: Pulmonary effort is normal. No respiratory distress.   Abdominal:      Comments: Soft, nondistended, appropriately TTP; incision c/d/I without erythema or drainage   Musculoskeletal:         General: No tenderness. Normal range of motion.      Cervical back:  Normal range of motion.   Skin:     General: Skin is warm and dry.      Capillary Refill: Capillary refill takes less than 2 seconds.      Findings: No rash.   Neurological:      Mental Status: She is alert and oriented to person, place, and time.       Significant Labs:  I have reviewed all pertinent lab results within the past 24 hours.  CBC:   Recent Labs   Lab 05/11/22  0546   WBC 11.82   RBC 3.59*   HGB 7.2*   HCT 25.5*      MCV 71*   MCH 20.1*   MCHC 28.2*     BMP:   Recent Labs   Lab 05/11/22  0546   *      K 4.3      CO2 24   BUN 18   CREATININE 1.0   CALCIUM 8.8   MG 1.5*

## 2022-05-11 NOTE — PROGRESS NOTES
[facility]  Colorectal Surgery  Progress Note    Subjective:       Interval History: Doing well postop. No nausea or vomiting. On PCA for pain control. Poor ambulation thus far. No BM/flatus.    Medications:  Continuous Infusions:   lactated ringers 75 mL/hr at 05/10/22 1824    morphine       Scheduled Meds:   chlorhexidine  10 mL Mouth/Throat BID    famotidine (PF)  20 mg Intravenous Q12H     PRN Meds:diphenhydrAMINE, hydrALAZINE, lactated ringers, metoprolol, naloxone, ondansetron, sodium chloride 0.9%, sodium chloride 0.9%     Review of patient's allergies indicates:  No Known Allergies  Objective:     Vital Signs (Most Recent):  Temp: 98.2 °F (36.8 °C) (05/11/22 1121)  Pulse: 75 (05/11/22 1121)  Resp: 17 (05/11/22 1121)  BP: 138/62 (05/11/22 1121)  SpO2: 96 % (05/11/22 1121) Vital Signs (24h Range):  Temp:  [97.8 °F (36.6 °C)-98.9 °F (37.2 °C)] 98.2 °F (36.8 °C)  Pulse:  [68-88] 75  Resp:  [13-24] 17  SpO2:  [95 %-100 %] 96 %  BP: (109-138)/(58-76) 138/62     Weight: 82.1 kg (181 lb)  Body mass index is 26.73 kg/m².    Intake/Output - Last 3 Shifts         05/09 0700  05/10 0659 05/10 0700 05/11 0659 05/11 0700  05/12 0659    P.O.   0    I.V. (mL/kg)  387.5 (4.7)     IV Piggyback  1800     Total Intake(mL/kg)  2187.5 (26.6) 0 (0)    Urine (mL/kg/hr)  1200     Drains  115     Blood  50     Total Output  1365     Net  +822.5 0                   Physical Exam  Constitutional:       Appearance: She is well-developed.   HENT:      Head: Normocephalic and atraumatic.      Nose:      Comments: NGT in place with bilious output in cannister  Eyes:      Conjunctiva/sclera: Conjunctivae normal.   Neck:      Thyroid: No thyromegaly.   Cardiovascular:      Rate and Rhythm: Normal rate and regular rhythm.   Pulmonary:      Effort: Pulmonary effort is normal. No respiratory distress.   Abdominal:      Comments: Soft, nondistended, appropriately TTP; incision c/d/I without erythema or drainage   Musculoskeletal:          General: No tenderness. Normal range of motion.      Cervical back: Normal range of motion.   Skin:     General: Skin is warm and dry.      Capillary Refill: Capillary refill takes less than 2 seconds.      Findings: No rash.   Neurological:      Mental Status: She is alert and oriented to person, place, and time.       Significant Labs:  I have reviewed all pertinent lab results within the past 24 hours.  CBC:   Recent Labs   Lab 05/11/22  0546   WBC 11.82   RBC 3.59*   HGB 7.2*   HCT 25.5*      MCV 71*   MCH 20.1*   MCHC 28.2*     BMP:   Recent Labs   Lab 05/11/22  0546   *      K 4.3      CO2 24   BUN 18   CREATININE 1.0   CALCIUM 8.8   MG 1.5*     Assessment/Plan:     Malignant neoplasm of sigmoid colon  72yo F with distal transvserse colon adenocarcinoma who is now s/p open subtotal colectomy, partial gastrectomy, partial pancreatectomy and liver biopsy on 5/10/22    - cont NPO  - cont NGT to cLWS. Will obtain UGI tomorrow to evaluate stomach repair  - cont PCA for pain control  - cont IVF  - OOB, ambulation encouraged  - IS 10xs/hr while awake  - await bowel function    Primary hypertension  Stable, monitor. PRN IV meds while NPO        Maury Liu MD  Colorectal Surgery  [facility]

## 2022-05-11 NOTE — HOSPITAL COURSE
05/11/2022: POD 1. Doing well postop. No nausea or vomiting. On PCA for pain control. Poor ambulation thus far. No BM/flatus.    05/12/2022:  Postop day 2 Pain controlled on PCA. Still with poor ambulation. Denies nausea and vomiting, still with NG tube. Reports some flatus but no BM.     05/13/2022:  Postop day 3 Pain controlled on PCA. Ambulation improving. Denies nausea and vomiting, tolerated clear liquids after NG tube was removed. Reports some flatus and a BM this morning.     05/14/2022 postop day 4 some reflux now controlled with Pepcid, pain control improving, tolerating liquids advanced to full liquids continuing to have bowel function    05/15/2022 postop day 5 patient tolerated clear liquids however was not brought full liquids despite diet advancement, mild reflux but no nausea or emesis, ambulating, having bowel function    Tolerating regular diet, stable and ready for discharge

## 2022-05-11 NOTE — ASSESSMENT & PLAN NOTE
72yo F with distal transvserse colon adenocarcinoma who is now s/p open subtotal colectomy, partial gastrectomy, partial pancreatectomy and liver biopsy on 5/10/22    - cont NPO  - cont NGT to cLWS. Will obtain UGI tomorrow to evaluate stomach repair  - cont PCA for pain control  - cont IVF  - OOB, ambulation encouraged  - IS 10xs/hr while awake  - await bowel function

## 2022-05-12 LAB
ANION GAP SERPL CALC-SCNC: 7 MMOL/L (ref 8–16)
BASOPHILS # BLD AUTO: 0.03 K/UL (ref 0–0.2)
BASOPHILS NFR BLD: 0.4 % (ref 0–1.9)
BUN SERPL-MCNC: 14 MG/DL (ref 8–23)
CALCIUM SERPL-MCNC: 8.3 MG/DL (ref 8.7–10.5)
CHLORIDE SERPL-SCNC: 106 MMOL/L (ref 95–110)
CO2 SERPL-SCNC: 27 MMOL/L (ref 23–29)
CREAT SERPL-MCNC: 0.6 MG/DL (ref 0.5–1.4)
DIFFERENTIAL METHOD: ABNORMAL
EOSINOPHIL # BLD AUTO: 0 K/UL (ref 0–0.5)
EOSINOPHIL NFR BLD: 0.2 % (ref 0–8)
ERYTHROCYTE [DISTWIDTH] IN BLOOD BY AUTOMATED COUNT: 23.3 % (ref 11.5–14.5)
EST. GFR  (AFRICAN AMERICAN): >60 ML/MIN/1.73 M^2
EST. GFR  (NON AFRICAN AMERICAN): >60 ML/MIN/1.73 M^2
GLUCOSE SERPL-MCNC: 98 MG/DL (ref 70–110)
HCT VFR BLD AUTO: 21.6 % (ref 37–48.5)
HGB BLD-MCNC: 6.1 G/DL (ref 12–16)
IMM GRANULOCYTES # BLD AUTO: 0.04 K/UL (ref 0–0.04)
IMM GRANULOCYTES NFR BLD AUTO: 0.5 % (ref 0–0.5)
LYMPHOCYTES # BLD AUTO: 1.6 K/UL (ref 1–4.8)
LYMPHOCYTES NFR BLD: 19.3 % (ref 18–48)
MAGNESIUM SERPL-MCNC: 1.6 MG/DL (ref 1.6–2.6)
MCH RBC QN AUTO: 20.3 PG (ref 27–31)
MCHC RBC AUTO-ENTMCNC: 28.2 G/DL (ref 32–36)
MCV RBC AUTO: 72 FL (ref 82–98)
MONOCYTES # BLD AUTO: 0.6 K/UL (ref 0.3–1)
MONOCYTES NFR BLD: 7 % (ref 4–15)
NEUTROPHILS # BLD AUTO: 6 K/UL (ref 1.8–7.7)
NEUTROPHILS NFR BLD: 72.6 % (ref 38–73)
NRBC BLD-RTO: 0 /100 WBC
PHOSPHATE SERPL-MCNC: 2.1 MG/DL (ref 2.7–4.5)
PLATELET # BLD AUTO: 336 K/UL (ref 150–450)
PMV BLD AUTO: 9.1 FL (ref 9.2–12.9)
POTASSIUM SERPL-SCNC: 3.7 MMOL/L (ref 3.5–5.1)
RBC # BLD AUTO: 3.01 M/UL (ref 4–5.4)
SODIUM SERPL-SCNC: 140 MMOL/L (ref 136–145)
WBC # BLD AUTO: 8.29 K/UL (ref 3.9–12.7)

## 2022-05-12 PROCEDURE — 36415 COLL VENOUS BLD VENIPUNCTURE: CPT | Performed by: COLON & RECTAL SURGERY

## 2022-05-12 PROCEDURE — 84100 ASSAY OF PHOSPHORUS: CPT | Performed by: COLON & RECTAL SURGERY

## 2022-05-12 PROCEDURE — 80048 BASIC METABOLIC PNL TOTAL CA: CPT | Performed by: COLON & RECTAL SURGERY

## 2022-05-12 PROCEDURE — S5010 5% DEXTROSE AND 0.45% SALINE: HCPCS | Performed by: COLON & RECTAL SURGERY

## 2022-05-12 PROCEDURE — 25500020 PHARM REV CODE 255: Performed by: COLON & RECTAL SURGERY

## 2022-05-12 PROCEDURE — 25000003 PHARM REV CODE 250: Performed by: COLON & RECTAL SURGERY

## 2022-05-12 PROCEDURE — 94761 N-INVAS EAR/PLS OXIMETRY MLT: CPT

## 2022-05-12 PROCEDURE — 99900035 HC TECH TIME PER 15 MIN (STAT)

## 2022-05-12 PROCEDURE — 11000001 HC ACUTE MED/SURG PRIVATE ROOM

## 2022-05-12 PROCEDURE — 83735 ASSAY OF MAGNESIUM: CPT | Performed by: COLON & RECTAL SURGERY

## 2022-05-12 PROCEDURE — 36430 TRANSFUSION BLD/BLD COMPNT: CPT

## 2022-05-12 PROCEDURE — 85025 COMPLETE CBC W/AUTO DIFF WBC: CPT | Performed by: COLON & RECTAL SURGERY

## 2022-05-12 PROCEDURE — 63600175 PHARM REV CODE 636 W HCPCS: Performed by: COLON & RECTAL SURGERY

## 2022-05-12 PROCEDURE — P9016 RBC LEUKOCYTES REDUCED: HCPCS | Performed by: COLON & RECTAL SURGERY

## 2022-05-12 PROCEDURE — 94799 UNLISTED PULMONARY SVC/PX: CPT

## 2022-05-12 RX ORDER — HYDROCODONE BITARTRATE AND ACETAMINOPHEN 500; 5 MG/1; MG/1
TABLET ORAL
Status: DISCONTINUED | OUTPATIENT
Start: 2022-05-12 | End: 2022-05-15 | Stop reason: HOSPADM

## 2022-05-12 RX ADMIN — FAMOTIDINE 20 MG: 10 INJECTION INTRAVENOUS at 09:05

## 2022-05-12 RX ADMIN — MORPHINE SULFATE: 1 INJECTION INTRAVENOUS at 03:05

## 2022-05-12 RX ADMIN — DEXTROSE AND SODIUM CHLORIDE: 5; .45 INJECTION, SOLUTION INTRAVENOUS at 08:05

## 2022-05-12 RX ADMIN — DIATRIZOATE MEGLUMINE AND DIATRIZOATE SODIUM 31 ML: 660; 100 LIQUID ORAL; RECTAL at 11:05

## 2022-05-12 RX ADMIN — CHLORHEXIDINE GLUCONATE 0.12% ORAL RINSE 10 ML: 1.2 LIQUID ORAL at 09:05

## 2022-05-12 NOTE — SUBJECTIVE & OBJECTIVE
Interval History: POD 2. Pain controlled on PCA. Still with poor ambulation. Denies nausea and vomiting, still with NG tube. Reports some flatus but no BM.     Medications:  Continuous Infusions:   dextrose 5 % and 0.45 % NaCl 75 mL/hr at 05/12/22 0149    morphine       Scheduled Meds:   chlorhexidine  10 mL Mouth/Throat BID    famotidine (PF)  20 mg Intravenous Q12H     PRN Meds:diphenhydrAMINE, hydrALAZINE, lactated ringers, metoprolol, naloxone, ondansetron, sodium chloride 0.9%, sodium chloride 0.9%     Review of patient's allergies indicates:  No Known Allergies  Objective:     Vital Signs (Most Recent):  Temp: 98.2 °F (36.8 °C) (05/12/22 0756)  Pulse: 86 (05/12/22 0756)  Resp: 17 (05/12/22 0756)  BP: (!) 152/74 (05/12/22 0756)  SpO2: 97 % (05/12/22 0756)   Vital Signs (24h Range):  Temp:  [98.2 °F (36.8 °C)-98.7 °F (37.1 °C)] 98.2 °F (36.8 °C)  Pulse:  [75-91] 86  Resp:  [16-23] 17  SpO2:  [95 %-97 %] 97 %  BP: (132-169)/(62-77) 152/74     Weight: 82.1 kg (181 lb)  Body mass index is 26.73 kg/m².    Intake/Output - Last 3 Shifts         05/10 0700  05/11 0659 05/11 0700  05/12 0659 05/12 0700  05/13 0659    P.O.  0 0    I.V. (mL/kg) 387.5 (4.7) 2216.3 (27)     IV Piggyback 1800      Total Intake(mL/kg) 2187.5 (26.6) 2216.3 (27) 0 (0)    Urine (mL/kg/hr) 1200 1650 (0.8) 1550 (5.9)    Drains 115 250     Blood 50      Total Output 1365 1900 1550    Net +822.5 +316.3 -1550                   Physical Exam  Vitals reviewed.   Constitutional:       General: She is not in acute distress.     Appearance: Normal appearance. She is well-developed. She is not ill-appearing.   HENT:      Head: Normocephalic and atraumatic.      Right Ear: External ear normal.      Left Ear: External ear normal.   Eyes:      Extraocular Movements: Extraocular movements intact.      Conjunctiva/sclera: Conjunctivae normal.   Cardiovascular:      Rate and Rhythm: Normal rate.   Pulmonary:      Effort: Pulmonary effort is normal. No  respiratory distress.   Abdominal:      General: There is no distension.      Palpations: Abdomen is soft.      Tenderness: There is abdominal tenderness (Expected post-operative).      Comments: Non-distended. Surgical dressings and mirza removed. Wound with serosanguinous drainage, no signs of infection. JENI drain with moderate serosanguinous output. NG tube with bilious output.    Musculoskeletal:      Cervical back: Neck supple.   Skin:     General: Skin is warm and dry.   Neurological:      Mental Status: She is alert and oriented to person, place, and time.   Psychiatric:         Behavior: Behavior normal.       Significant Labs:  I have reviewed all pertinent lab results within the past 24 hours.  CBC:   Recent Labs   Lab 05/12/22 0627   WBC 8.29   RBC 3.01*   HGB 6.1*   HCT 21.6*      MCV 72*   MCH 20.3*   MCHC 28.2*     CMP:   Recent Labs   Lab 05/12/22 0627   GLU 98   CALCIUM 8.3*      K 3.7   CO2 27      BUN 14   CREATININE 0.6       Significant Diagnostics:  I have reviewed all pertinent imaging results/findings within the past 24 hours.  No new pertinent imaging.

## 2022-05-12 NOTE — ASSESSMENT & PLAN NOTE
72yo F with distal transvserse colon adenocarcinoma who is now s/p open subtotal colectomy, partial gastrectomy, partial pancreatectomy and liver biopsy on 5/10/22    - cont NPO  - cont NGT to cLWS. Will obtain UGI today and consider d/c NGT and diet advancement based on results.  - cont PCA for pain control  - cont IVF  - H/H dropped today, type and screen and administer 1 unit PRBC  - OOB, ambulation encouraged  - IS 10xs/hr while awake  - await bowel function

## 2022-05-12 NOTE — PLAN OF CARE
[facility]  Initial Discharge Assessment       Primary Care Provider: Gin Vitale MD    Admission Diagnosis: Malignant neoplasm of colon, unspecified part of colon [C18.9]  Postmenopausal bleeding [N95.0]    Admission Date: 5/10/2022  Expected Discharge Date:     Discharge Barriers Identified: None    Payor: [employer] MANAGED MEDICARE / Plan: [employer] MEDICARE HMO / Product Type: Capitation /     Extended Emergency Contact Information  Primary Emergency Contact: rodney purvis  Mobile Phone: [phone]  Relation: Son  Preferred language: English   needed? No  Secondary Emergency Contact: michael velez  Relation: Relative  Preferred language: English   needed? No    Discharge Plan A: Home         [id number] - [place] - [address] [facility]  [address]  [place] [address]  Phone: [phone] Fax: [phone]      Initial Assessment (most recent)     Adult Discharge Assessment - 05/12/22 0921        Discharge Assessment    Assessment Type Discharge Planning Assessment     Confirmed/corrected address, phone number and insurance Yes     Confirmed Demographics Correct on Facesheet     Source of Information patient     Communicated RAMIRO with patient/caregiver Date not available/Unable to determine     Reason For Admission post-op     Lives With alone     Facility Arrived From: home     Do you expect to return to your current living situation? Yes     Do you have help at home or someone to help you manage your care at home? Yes     Who are your caregiver(s) and their phone number(s)? son and other family members     Prior to hospitilization cognitive status: Alert/Oriented     Current cognitive status: Alert/Oriented     Walking or Climbing Stairs Difficulty none     Dressing/Bathing Difficulty none     Equipment Currently Used at Home none     Readmission within 30 days? No     Patient currently being followed by  outpatient case management? No     Do you currently have service(s) that help you manage your care at home? No     Do you take prescription medications? Yes     Do you have prescription coverage? Yes     Do you have any problems affording any of your prescribed medications? No     Is the patient taking medications as prescribed? yes     Who is going to help you get home at discharge? fmaily     How do you get to doctors appointments? car, drives self     Are you on dialysis? No     Do you take coumadin? No     Discharge Plan A Home     DME Needed Upon Discharge  none     Discharge Plan discussed with: Patient     Discharge Barriers Identified None                  Case management met with pt and her son at the bedside to assess for discharge needs and explained case management role.CM provided a transitional care folder, information on advanced directives, information on pharmacy bedside delivery, and discharge planning begins on admission with contact information for any needs/questions.     Pt is independent at baseline and does not use any DME. Pts physical address is [address] [place] [address] [address] [place] [id number].

## 2022-05-12 NOTE — PROGRESS NOTES
[facility] Surg  Colorectal Surgery  Progress Note    Subjective:     History of Present Illness:  70 y/o female with colon adenocarcinoma who presents for definitive surgical management.       Post-Op Info:  Procedure(s) (LRB):  COLECTOMY, PARTIAL (Left)  HYSTEROSCOPY, WITH DILATION AND CURETTAGE OF UTERUS (N/A)  BLOCK, TRANSVERSUS ABDOMINIS PLANE (N/A)  GASTRECTOMY, PARTIAL (N/A)  OMENTECTOMY  CYSTOSCOPY (N/A)  BIOPSY, LIVER   2 Days Post-Op     Interval History: POD 2. Pain controlled on PCA. Still with poor ambulation. Denies nausea and vomiting, still with NG tube. Reports some flatus but no BM.     Medications:  Continuous Infusions:   dextrose 5 % and 0.45 % NaCl 75 mL/hr at 05/12/22 0149    morphine       Scheduled Meds:   chlorhexidine  10 mL Mouth/Throat BID    famotidine (PF)  20 mg Intravenous Q12H     PRN Meds:diphenhydrAMINE, hydrALAZINE, lactated ringers, metoprolol, naloxone, ondansetron, sodium chloride 0.9%, sodium chloride 0.9%     Review of patient's allergies indicates:  No Known Allergies  Objective:     Vital Signs (Most Recent):  Temp: 98.2 °F (36.8 °C) (05/12/22 0756)  Pulse: 86 (05/12/22 0756)  Resp: 17 (05/12/22 0756)  BP: (!) 152/74 (05/12/22 0756)  SpO2: 97 % (05/12/22 0756)   Vital Signs (24h Range):  Temp:  [98.2 °F (36.8 °C)-98.7 °F (37.1 °C)] 98.2 °F (36.8 °C)  Pulse:  [75-91] 86  Resp:  [16-23] 17  SpO2:  [95 %-97 %] 97 %  BP: (132-169)/(62-77) 152/74     Weight: 82.1 kg (181 lb)  Body mass index is 26.73 kg/m².    Intake/Output - Last 3 Shifts         05/10 0700  05/11 0659 05/11 0700  05/12 0659 05/12 0700  05/13 0659    P.O.  0 0    I.V. (mL/kg) 387.5 (4.7) 2216.3 (27)     IV Piggyback 1800      Total Intake(mL/kg) 2187.5 (26.6) 2216.3 (27) 0 (0)    Urine (mL/kg/hr) 1200 1650 (0.8) 1550 (5.9)    Drains 115 250     Blood 50      Total Output 1365 1900 1550    Net +822.5 +316.3 -1550                   Physical Exam  Vitals reviewed.   Constitutional:       General: She is not  in acute distress.     Appearance: Normal appearance. She is well-developed. She is not ill-appearing.   HENT:      Head: Normocephalic and atraumatic.      Right Ear: External ear normal.      Left Ear: External ear normal.   Eyes:      Extraocular Movements: Extraocular movements intact.      Conjunctiva/sclera: Conjunctivae normal.   Cardiovascular:      Rate and Rhythm: Normal rate.   Pulmonary:      Effort: Pulmonary effort is normal. No respiratory distress.   Abdominal:      General: There is no distension.      Palpations: Abdomen is soft.      Tenderness: There is abdominal tenderness (Expected post-operative).      Comments: Non-distended. Surgical dressings and mirza removed. Wound with serosanguinous drainage, no signs of infection. JENI drain with moderate serosanguinous output. NG tube with bilious output.    Musculoskeletal:      Cervical back: Neck supple.   Skin:     General: Skin is warm and dry.   Neurological:      Mental Status: She is alert and oriented to person, place, and time.   Psychiatric:         Behavior: Behavior normal.       Significant Labs:  I have reviewed all pertinent lab results within the past 24 hours.  CBC:   Recent Labs   Lab 05/12/22  0627   WBC 8.29   RBC 3.01*   HGB 6.1*   HCT 21.6*      MCV 72*   MCH 20.3*   MCHC 28.2*     CMP:   Recent Labs   Lab 05/12/22  0627   GLU 98   CALCIUM 8.3*      K 3.7   CO2 27      BUN 14   CREATININE 0.6       Significant Diagnostics:  I have reviewed all pertinent imaging results/findings within the past 24 hours.  No new pertinent imaging.    Assessment/Plan:     Primary hypertension  Stable, monitor. PRN IV meds while NPO    Malignant neoplasm of sigmoid colon  72yo F with distal transvserse colon adenocarcinoma who is now s/p open subtotal colectomy, partial gastrectomy, partial pancreatectomy and liver biopsy on 5/10/22    - cont NPO  - cont NGT to cLWS. Will obtain UGI today and consider d/c NGT and diet advancement  based on results.  - cont PCA for pain control  - cont IVF  - H/H dropped today, type and screen and administer 1 unit PRBC  - OOB, ambulation encouraged  - IS 10xs/hr while awake  - await bowel function        Leslie Montoya PA-C  Colorectal Surgery  [facility]

## 2022-05-12 NOTE — PLAN OF CARE
Pt remains free of falls/injury this shift. Safety precautions maintained. Pain managed with PCA pump. IVFs infusing. VSS. No signs and symptoms of acute distress noted at this time. 12 hour chart check completed.Will continue to monitor.

## 2022-05-13 LAB
AMYLASE, BODY FLUID: 14 U/L
ANION GAP SERPL CALC-SCNC: 8 MMOL/L (ref 8–16)
BASOPHILS # BLD AUTO: 0.03 K/UL (ref 0–0.2)
BASOPHILS NFR BLD: 0.4 % (ref 0–1.9)
BLD PROD TYP BPU: NORMAL
BLD PROD TYP BPU: NORMAL
BLOOD UNIT EXPIRATION DATE: NORMAL
BLOOD UNIT EXPIRATION DATE: NORMAL
BLOOD UNIT TYPE CODE: 5100
BLOOD UNIT TYPE CODE: 5100
BLOOD UNIT TYPE: NORMAL
BLOOD UNIT TYPE: NORMAL
BODY FLUID SOURCE AMYLASE: NORMAL
BUN SERPL-MCNC: 7 MG/DL (ref 8–23)
CALCIUM SERPL-MCNC: 8.5 MG/DL (ref 8.7–10.5)
CHLORIDE SERPL-SCNC: 106 MMOL/L (ref 95–110)
CO2 SERPL-SCNC: 25 MMOL/L (ref 23–29)
CODING SYSTEM: NORMAL
CODING SYSTEM: NORMAL
CREAT SERPL-MCNC: 0.6 MG/DL (ref 0.5–1.4)
DIFFERENTIAL METHOD: ABNORMAL
DISPENSE STATUS: NORMAL
DISPENSE STATUS: NORMAL
EOSINOPHIL # BLD AUTO: 0.1 K/UL (ref 0–0.5)
EOSINOPHIL NFR BLD: 1.3 % (ref 0–8)
ERYTHROCYTE [DISTWIDTH] IN BLOOD BY AUTOMATED COUNT: 23.7 % (ref 11.5–14.5)
EST. GFR  (AFRICAN AMERICAN): >60 ML/MIN/1.73 M^2
EST. GFR  (NON AFRICAN AMERICAN): >60 ML/MIN/1.73 M^2
FINAL PATHOLOGIC DIAGNOSIS: NORMAL
GLUCOSE SERPL-MCNC: 96 MG/DL (ref 70–110)
GROSS: NORMAL
HCT VFR BLD AUTO: 25.4 % (ref 37–48.5)
HGB BLD-MCNC: 7.4 G/DL (ref 12–16)
IMM GRANULOCYTES # BLD AUTO: 0.03 K/UL (ref 0–0.04)
IMM GRANULOCYTES NFR BLD AUTO: 0.4 % (ref 0–0.5)
LYMPHOCYTES # BLD AUTO: 1.3 K/UL (ref 1–4.8)
LYMPHOCYTES NFR BLD: 15.7 % (ref 18–48)
Lab: NORMAL
MAGNESIUM SERPL-MCNC: 1.6 MG/DL (ref 1.6–2.6)
MCH RBC QN AUTO: 21 PG (ref 27–31)
MCHC RBC AUTO-ENTMCNC: 29.1 G/DL (ref 32–36)
MCV RBC AUTO: 72 FL (ref 82–98)
MONOCYTES # BLD AUTO: 0.6 K/UL (ref 0.3–1)
MONOCYTES NFR BLD: 7.8 % (ref 4–15)
NEUTROPHILS # BLD AUTO: 5.9 K/UL (ref 1.8–7.7)
NEUTROPHILS NFR BLD: 74.4 % (ref 38–73)
NRBC BLD-RTO: 0 /100 WBC
NUM UNITS TRANS PACKED RBC: NORMAL
NUM UNITS TRANS PACKED RBC: NORMAL
PHOSPHATE SERPL-MCNC: 2.3 MG/DL (ref 2.7–4.5)
PLATELET # BLD AUTO: 308 K/UL (ref 150–450)
PMV BLD AUTO: 8.4 FL (ref 9.2–12.9)
POTASSIUM SERPL-SCNC: 3.5 MMOL/L (ref 3.5–5.1)
RBC # BLD AUTO: 3.53 M/UL (ref 4–5.4)
SODIUM SERPL-SCNC: 139 MMOL/L (ref 136–145)
WBC # BLD AUTO: 7.97 K/UL (ref 3.9–12.7)

## 2022-05-13 PROCEDURE — 25000003 PHARM REV CODE 250

## 2022-05-13 PROCEDURE — 85025 COMPLETE CBC W/AUTO DIFF WBC: CPT | Performed by: COLON & RECTAL SURGERY

## 2022-05-13 PROCEDURE — S5010 5% DEXTROSE AND 0.45% SALINE: HCPCS | Performed by: COLON & RECTAL SURGERY

## 2022-05-13 PROCEDURE — 36415 COLL VENOUS BLD VENIPUNCTURE: CPT | Performed by: COLON & RECTAL SURGERY

## 2022-05-13 PROCEDURE — S5010 5% DEXTROSE AND 0.45% SALINE: HCPCS

## 2022-05-13 PROCEDURE — 25000003 PHARM REV CODE 250: Performed by: COLON & RECTAL SURGERY

## 2022-05-13 PROCEDURE — 94799 UNLISTED PULMONARY SVC/PX: CPT

## 2022-05-13 PROCEDURE — 80048 BASIC METABOLIC PNL TOTAL CA: CPT | Performed by: COLON & RECTAL SURGERY

## 2022-05-13 PROCEDURE — 83735 ASSAY OF MAGNESIUM: CPT | Performed by: COLON & RECTAL SURGERY

## 2022-05-13 PROCEDURE — 11000001 HC ACUTE MED/SURG PRIVATE ROOM

## 2022-05-13 PROCEDURE — 82150 ASSAY OF AMYLASE: CPT

## 2022-05-13 PROCEDURE — 84100 ASSAY OF PHOSPHORUS: CPT | Performed by: COLON & RECTAL SURGERY

## 2022-05-13 RX ORDER — AMLODIPINE BESYLATE 5 MG/1
5 TABLET ORAL DAILY
Status: DISCONTINUED | OUTPATIENT
Start: 2022-05-13 | End: 2022-05-15 | Stop reason: HOSPADM

## 2022-05-13 RX ORDER — OXYCODONE HYDROCHLORIDE 5 MG/1
5 TABLET ORAL EVERY 6 HOURS PRN
Status: DISCONTINUED | OUTPATIENT
Start: 2022-05-13 | End: 2022-05-15 | Stop reason: HOSPADM

## 2022-05-13 RX ORDER — OXYCODONE HYDROCHLORIDE 5 MG/1
10 TABLET ORAL EVERY 6 HOURS PRN
Status: DISCONTINUED | OUTPATIENT
Start: 2022-05-13 | End: 2022-05-15 | Stop reason: HOSPADM

## 2022-05-13 RX ORDER — ACETAMINOPHEN 325 MG/1
650 TABLET ORAL EVERY 6 HOURS
Status: DISCONTINUED | OUTPATIENT
Start: 2022-05-13 | End: 2022-05-15 | Stop reason: HOSPADM

## 2022-05-13 RX ORDER — SODIUM CHLORIDE, SODIUM LACTATE, POTASSIUM CHLORIDE, CALCIUM CHLORIDE 600; 310; 30; 20 MG/100ML; MG/100ML; MG/100ML; MG/100ML
INJECTION, SOLUTION INTRAVENOUS
Status: DISCONTINUED | OUTPATIENT
Start: 2022-05-13 | End: 2022-05-15 | Stop reason: HOSPADM

## 2022-05-13 RX ORDER — SODIUM CHLORIDE, SODIUM LACTATE, POTASSIUM CHLORIDE, CALCIUM CHLORIDE 600; 310; 30; 20 MG/100ML; MG/100ML; MG/100ML; MG/100ML
INJECTION, SOLUTION INTRAVENOUS
Status: DISCONTINUED | OUTPATIENT
Start: 2022-05-13 | End: 2022-05-13

## 2022-05-13 RX ADMIN — FAMOTIDINE 20 MG: 10 INJECTION INTRAVENOUS at 08:05

## 2022-05-13 RX ADMIN — DEXTROSE AND SODIUM CHLORIDE: 5; .45 INJECTION, SOLUTION INTRAVENOUS at 08:05

## 2022-05-13 RX ADMIN — DEXTROSE AND SODIUM CHLORIDE: 5; .45 INJECTION, SOLUTION INTRAVENOUS at 05:05

## 2022-05-13 RX ADMIN — OXYCODONE HYDROCHLORIDE 5 MG: 5 TABLET ORAL at 02:05

## 2022-05-13 RX ADMIN — OXYCODONE HYDROCHLORIDE 5 MG: 5 TABLET ORAL at 08:05

## 2022-05-13 RX ADMIN — CHLORHEXIDINE GLUCONATE 0.12% ORAL RINSE 10 ML: 1.2 LIQUID ORAL at 08:05

## 2022-05-13 RX ADMIN — AMLODIPINE BESYLATE 5 MG: 5 TABLET ORAL at 08:05

## 2022-05-13 NOTE — PLAN OF CARE
Pt resting in bed, remains free of falls and injuries this shift. VSS. No obvious s/sx of distress noted. Pain controlled with PRN oral medications. Incision to abdomen C/D/I with staples. POC reviewed with the patient, verbalized understanding. No further needs at this time, call light within reach. Will continue POC as ordered.

## 2022-05-13 NOTE — PLAN OF CARE
"Plan of care reviewed with patient, pt verbalized understanding.  Pt remains free from falls this shift, safety precautions in place.bed alarm set.  Pt remains free from skin breakdown, pt educated on the importance of frequent weight shift to decrease the risk of pressure injury. Pt verbalized understanding teaching and outcomes.  AAOx4,NAD noted at this time.  BP (!) 157/71 (BP Location: Right arm, Patient Position: Lying)   Pulse 79   Temp 98.7 °F (37.1 °C) (Oral)   Resp 18   Ht 5' 9" (1.753 m)   Wt 80.1 kg (176 lb 9.4 oz)   LMP 06/03/2019   SpO2 97%   Breastfeeding No   BMI 26.08 kg/m²    PIV 22 G 20 G to L FA R FA , Saline locked  Pt remained afebrile.  Pt admitted for Malignant neoplasm of sigmoid colon.  Pt currently resting comfortably in bed.  Hourly rounding complete. Bed in lowest position, side rails up, call light in reach.  Family remains at the bedside.    "

## 2022-05-13 NOTE — ASSESSMENT & PLAN NOTE
70yo F with distal transvserse colon adenocarcinoma who is now s/p open subtotal colectomy, partial gastrectomy, partial pancreatectomy and liver biopsy on 5/10/22    - Advance to full liquids today,monitor for tolerance.   - NG tube removed after no evidence of leak on UGI  - D/C PCA, transition to PO pain control  - cont IVF, decrease  - H/H improved today  - OOB, ambulation encouraged  - IS 10xs/hr while awake  - await improved bowel function

## 2022-05-13 NOTE — PROGRESS NOTES
[facility] Surg  Colorectal Surgery  Progress Note    Subjective:     History of Present Illness:  72 y/o female with colon adenocarcinoma who presents for definitive surgical management.       Post-Op Info:  Procedure(s) (LRB):  COLECTOMY, PARTIAL (Left)  HYSTEROSCOPY, WITH DILATION AND CURETTAGE OF UTERUS (N/A)  BLOCK, TRANSVERSUS ABDOMINIS PLANE (N/A)  GASTRECTOMY, PARTIAL (N/A)  OMENTECTOMY  CYSTOSCOPY (N/A)  BIOPSY, LIVER   3 Days Post-Op     Interval History: Pain controlled on PCA. Ambulation improving. Denies nausea and vomiting, tolerated clear liquids after NG tube was removed. Reports some flatus and a BM this morning.     Medications:  Continuous Infusions:   dextrose 5 % and 0.45 % NaCl 75 mL/hr at 05/13/22 0532    morphine       Scheduled Meds:   chlorhexidine  10 mL Mouth/Throat BID    famotidine (PF)  20 mg Intravenous Q12H     PRN Meds:sodium chloride, diphenhydrAMINE, hydrALAZINE, lactated ringers, metoprolol, naloxone, ondansetron, sodium chloride 0.9%, sodium chloride 0.9%     Review of patient's allergies indicates:  No Known Allergies  Objective:     Vital Signs (Most Recent):  Temp: 98.3 °F (36.8 °C) (05/13/22 0737)  Pulse: 77 (05/13/22 0737)  Resp: 18 (05/13/22 0737)  BP: (!) 155/70 (05/13/22 0737)  SpO2: 96 % (05/13/22 0737)   Vital Signs (24h Range):  Temp:  [98.3 °F (36.8 °C)-99.2 °F (37.3 °C)] 98.3 °F (36.8 °C)  Pulse:  [77-85] 77  Resp:  [17-18] 18  SpO2:  [94 %-99 %] 96 %  BP: (129-162)/(62-76) 155/70     Weight: 80.1 kg (176 lb 9.4 oz)  Body mass index is 26.08 kg/m².    Intake/Output - Last 3 Shifts         05/11 0700 05/12 0659 05/12 0700 05/13 0659 05/13 0700 05/14 0659    P.O. 0 360     I.V. (mL/kg) 2216.3 (27) 1938.8 (24.2) 2 (0)    Blood  358.3     IV Piggyback       Total Intake(mL/kg) 2216.3 (27) 2657.1 (33.2) 2 (0)    Urine (mL/kg/hr) 1650 (0.8) 3650 (1.9)     Emesis/NG output  0     Drains 250 80     Stool  0     Blood       Total Output 1900 3730     Net +316.3  -1072.9 +2           Stool Occurrence  1 x             Physical Exam  Vitals and nursing note reviewed.   Constitutional:       General: She is not in acute distress.     Appearance: Normal appearance. She is well-developed. She is not ill-appearing.   HENT:      Head: Normocephalic and atraumatic.      Right Ear: External ear normal.      Left Ear: External ear normal.   Eyes:      Extraocular Movements: Extraocular movements intact.      Conjunctiva/sclera: Conjunctivae normal.   Cardiovascular:      Rate and Rhythm: Normal rate.   Pulmonary:      Effort: Pulmonary effort is normal. No respiratory distress.   Abdominal:      General: There is no distension.      Palpations: Abdomen is soft.      Tenderness: There is abdominal tenderness (Minimal, expected).      Comments: Incision clean with minimal drainage, no signs of infection. JENI drain serosanguinous.   Musculoskeletal:      Cervical back: Neck supple.   Skin:     General: Skin is warm and dry.   Neurological:      Mental Status: She is alert and oriented to person, place, and time.   Psychiatric:         Behavior: Behavior normal.       Significant Labs:  I have reviewed all pertinent lab results within the past 24 hours.  CBC:   Recent Labs   Lab 05/13/22  0442   WBC 7.97   RBC 3.53*   HGB 7.4*   HCT 25.4*      MCV 72*   MCH 21.0*   MCHC 29.1*     CMP:   Recent Labs   Lab 05/13/22  0442   GLU 96   CALCIUM 8.5*      K 3.5   CO2 25      BUN 7*   CREATININE 0.6       Significant Diagnostics:  I have reviewed all pertinent imaging results/findings within the past 24 hours.  No new pertinent imaging.    Assessment/Plan:     Primary hypertension  Stable, monitor.Transition to PO home meds    Malignant neoplasm of sigmoid colon  72yo F with distal transvserse colon adenocarcinoma who is now s/p open subtotal colectomy, partial gastrectomy, partial pancreatectomy and liver biopsy on 5/10/22    - Advance to full liquids today,monitor for  tolerance.   - NG tube removed after no evidence of leak on UGI  - D/C PCA, transition to PO pain control  - cont IVF, decrease  - H/H improved today  - OOB, ambulation encouraged  - IS 10xs/hr while awake  - await improved bowel function         - Bingham catheter to remain in place until cleared by urology due to concern for bladder injury.     Leslie Montoya PA-C  Colorectal Surgery  [facility]

## 2022-05-13 NOTE — SUBJECTIVE & OBJECTIVE
Interval History: Pain controlled on PCA. Ambulation improving. Denies nausea and vomiting, tolerated clear liquids after NG tube was removed. Reports some flatus and a BM this morning.     Medications:  Continuous Infusions:   dextrose 5 % and 0.45 % NaCl 75 mL/hr at 05/13/22 0532    morphine       Scheduled Meds:   chlorhexidine  10 mL Mouth/Throat BID    famotidine (PF)  20 mg Intravenous Q12H     PRN Meds:sodium chloride, diphenhydrAMINE, hydrALAZINE, lactated ringers, metoprolol, naloxone, ondansetron, sodium chloride 0.9%, sodium chloride 0.9%     Review of patient's allergies indicates:  No Known Allergies  Objective:     Vital Signs (Most Recent):  Temp: 98.3 °F (36.8 °C) (05/13/22 0737)  Pulse: 77 (05/13/22 0737)  Resp: 18 (05/13/22 0737)  BP: (!) 155/70 (05/13/22 0737)  SpO2: 96 % (05/13/22 0737)   Vital Signs (24h Range):  Temp:  [98.3 °F (36.8 °C)-99.2 °F (37.3 °C)] 98.3 °F (36.8 °C)  Pulse:  [77-85] 77  Resp:  [17-18] 18  SpO2:  [94 %-99 %] 96 %  BP: (129-162)/(62-76) 155/70     Weight: 80.1 kg (176 lb 9.4 oz)  Body mass index is 26.08 kg/m².    Intake/Output - Last 3 Shifts         05/11 0700 05/12 0659 05/12 0700 05/13 0659 05/13 0700 05/14 0659    P.O. 0 360     I.V. (mL/kg) 2216.3 (27) 1938.8 (24.2) 2 (0)    Blood  358.3     IV Piggyback       Total Intake(mL/kg) 2216.3 (27) 2657.1 (33.2) 2 (0)    Urine (mL/kg/hr) 1650 (0.8) 3650 (1.9)     Emesis/NG output  0     Drains 250 80     Stool  0     Blood       Total Output 1900 3730     Net +316.3 -1072.9 +2           Stool Occurrence  1 x             Physical Exam  Vitals and nursing note reviewed.   Constitutional:       General: She is not in acute distress.     Appearance: Normal appearance. She is well-developed. She is not ill-appearing.   HENT:      Head: Normocephalic and atraumatic.      Right Ear: External ear normal.      Left Ear: External ear normal.   Eyes:      Extraocular Movements: Extraocular movements intact.       Conjunctiva/sclera: Conjunctivae normal.   Cardiovascular:      Rate and Rhythm: Normal rate.   Pulmonary:      Effort: Pulmonary effort is normal. No respiratory distress.   Abdominal:      General: There is no distension.      Palpations: Abdomen is soft.      Tenderness: There is abdominal tenderness (Minimal, expected).      Comments: Incision clean with minimal drainage, no signs of infection. JENI drain serosanguinous.   Musculoskeletal:      Cervical back: Neck supple.   Skin:     General: Skin is warm and dry.   Neurological:      Mental Status: She is alert and oriented to person, place, and time.   Psychiatric:         Behavior: Behavior normal.       Significant Labs:  I have reviewed all pertinent lab results within the past 24 hours.  CBC:   Recent Labs   Lab 05/13/22  0442   WBC 7.97   RBC 3.53*   HGB 7.4*   HCT 25.4*      MCV 72*   MCH 21.0*   MCHC 29.1*     CMP:   Recent Labs   Lab 05/13/22  0442   GLU 96   CALCIUM 8.5*      K 3.5   CO2 25      BUN 7*   CREATININE 0.6       Significant Diagnostics:  I have reviewed all pertinent imaging results/findings within the past 24 hours.  No new pertinent imaging.

## 2022-05-14 LAB
ANION GAP SERPL CALC-SCNC: 13 MMOL/L (ref 8–16)
BASOPHILS # BLD AUTO: 0.02 K/UL (ref 0–0.2)
BASOPHILS NFR BLD: 0.2 % (ref 0–1.9)
BUN SERPL-MCNC: 11 MG/DL (ref 8–23)
CALCIUM SERPL-MCNC: 9.3 MG/DL (ref 8.7–10.5)
CHLORIDE SERPL-SCNC: 102 MMOL/L (ref 95–110)
CO2 SERPL-SCNC: 23 MMOL/L (ref 23–29)
CREAT SERPL-MCNC: 0.7 MG/DL (ref 0.5–1.4)
DIFFERENTIAL METHOD: ABNORMAL
EOSINOPHIL # BLD AUTO: 0 K/UL (ref 0–0.5)
EOSINOPHIL NFR BLD: 0.2 % (ref 0–8)
ERYTHROCYTE [DISTWIDTH] IN BLOOD BY AUTOMATED COUNT: 23.9 % (ref 11.5–14.5)
EST. GFR  (AFRICAN AMERICAN): >60 ML/MIN/1.73 M^2
EST. GFR  (NON AFRICAN AMERICAN): >60 ML/MIN/1.73 M^2
GLUCOSE SERPL-MCNC: 131 MG/DL (ref 70–110)
HCT VFR BLD AUTO: 31.8 % (ref 37–48.5)
HGB BLD-MCNC: 9.4 G/DL (ref 12–16)
IMM GRANULOCYTES # BLD AUTO: 0.05 K/UL (ref 0–0.04)
IMM GRANULOCYTES NFR BLD AUTO: 0.5 % (ref 0–0.5)
LYMPHOCYTES # BLD AUTO: 1 K/UL (ref 1–4.8)
LYMPHOCYTES NFR BLD: 9.6 % (ref 18–48)
MAGNESIUM SERPL-MCNC: 1.8 MG/DL (ref 1.6–2.6)
MCH RBC QN AUTO: 21.6 PG (ref 27–31)
MCHC RBC AUTO-ENTMCNC: 29.6 G/DL (ref 32–36)
MCV RBC AUTO: 73 FL (ref 82–98)
MONOCYTES # BLD AUTO: 0.5 K/UL (ref 0.3–1)
MONOCYTES NFR BLD: 5.4 % (ref 4–15)
NEUTROPHILS # BLD AUTO: 8.3 K/UL (ref 1.8–7.7)
NEUTROPHILS NFR BLD: 84.1 % (ref 38–73)
NRBC BLD-RTO: 0 /100 WBC
PHOSPHATE SERPL-MCNC: 3.8 MG/DL (ref 2.7–4.5)
PLATELET # BLD AUTO: 462 K/UL (ref 150–450)
PLATELET BLD QL SMEAR: ABNORMAL
PMV BLD AUTO: 8.8 FL (ref 9.2–12.9)
POTASSIUM SERPL-SCNC: 4 MMOL/L (ref 3.5–5.1)
RBC # BLD AUTO: 4.35 M/UL (ref 4–5.4)
SODIUM SERPL-SCNC: 138 MMOL/L (ref 136–145)
WBC # BLD AUTO: 9.92 K/UL (ref 3.9–12.7)

## 2022-05-14 PROCEDURE — 85025 COMPLETE CBC W/AUTO DIFF WBC: CPT | Performed by: COLON & RECTAL SURGERY

## 2022-05-14 PROCEDURE — 11000001 HC ACUTE MED/SURG PRIVATE ROOM

## 2022-05-14 PROCEDURE — 84100 ASSAY OF PHOSPHORUS: CPT | Performed by: COLON & RECTAL SURGERY

## 2022-05-14 PROCEDURE — 25000003 PHARM REV CODE 250: Performed by: COLON & RECTAL SURGERY

## 2022-05-14 PROCEDURE — 36415 COLL VENOUS BLD VENIPUNCTURE: CPT | Performed by: COLON & RECTAL SURGERY

## 2022-05-14 PROCEDURE — 80048 BASIC METABOLIC PNL TOTAL CA: CPT | Performed by: COLON & RECTAL SURGERY

## 2022-05-14 PROCEDURE — 94761 N-INVAS EAR/PLS OXIMETRY MLT: CPT

## 2022-05-14 PROCEDURE — 25000003 PHARM REV CODE 250

## 2022-05-14 PROCEDURE — 63600175 PHARM REV CODE 636 W HCPCS: Performed by: COLON & RECTAL SURGERY

## 2022-05-14 PROCEDURE — 83735 ASSAY OF MAGNESIUM: CPT | Performed by: COLON & RECTAL SURGERY

## 2022-05-14 RX ORDER — FAMOTIDINE 20 MG/1
20 TABLET, FILM COATED ORAL 2 TIMES DAILY
Status: DISCONTINUED | OUTPATIENT
Start: 2022-05-14 | End: 2022-05-15 | Stop reason: HOSPADM

## 2022-05-14 RX ADMIN — FAMOTIDINE 20 MG: 20 TABLET ORAL at 08:05

## 2022-05-14 RX ADMIN — ACETAMINOPHEN 650 MG: 325 TABLET ORAL at 06:05

## 2022-05-14 RX ADMIN — FAMOTIDINE 20 MG: 20 TABLET ORAL at 09:05

## 2022-05-14 RX ADMIN — ACETAMINOPHEN 650 MG: 325 TABLET ORAL at 05:05

## 2022-05-14 RX ADMIN — AMLODIPINE BESYLATE 5 MG: 5 TABLET ORAL at 09:05

## 2022-05-14 RX ADMIN — CHLORHEXIDINE GLUCONATE 0.12% ORAL RINSE 10 ML: 1.2 LIQUID ORAL at 08:05

## 2022-05-14 RX ADMIN — ACETAMINOPHEN 650 MG: 325 TABLET ORAL at 12:05

## 2022-05-14 RX ADMIN — CHLORHEXIDINE GLUCONATE 0.12% ORAL RINSE 10 ML: 1.2 LIQUID ORAL at 09:05

## 2022-05-14 RX ADMIN — ONDANSETRON 4 MG: 2 INJECTION INTRAMUSCULAR; INTRAVENOUS at 06:05

## 2022-05-14 NOTE — PROGRESS NOTES
Pharmacist Intervention IV to PO Note    Ambar Duenas is a 71 y.o. female being treated with IV medication famotidine    Patient Data:    Vital Signs (Most Recent):  Temp: 98.5 °F (36.9 °C) (05/14/22 0024)  Pulse: 95 (05/14/22 0024)  Resp: 16 (05/14/22 0024)  BP: (!) 143/87 (05/14/22 0024)  SpO2: 95 % (05/14/22 0024)   Vital Signs (72h Range):  Temp:  [97.9 °F (36.6 °C)-99.5 °F (37.5 °C)]   Pulse:  [3-95]   Resp:  [13-23]   BP: (129-169)/(58-87)   SpO2:  [94 %-99 %]      CBC:  Recent Labs   Lab 05/11/22  0546 05/12/22  0627 05/13/22  0442   WBC 11.82 8.29 7.97   RBC 3.59* 3.01* 3.53*   HGB 7.2* 6.1* 7.4*   HCT 25.5* 21.6* 25.4*    336 308   MCV 71* 72* 72*   MCH 20.1* 20.3* 21.0*   MCHC 28.2* 28.2* 29.1*     CMP:     Recent Labs   Lab 05/11/22  0546 05/12/22  0627 05/13/22  0442   * 98 96   CALCIUM 8.8 8.3* 8.5*    140 139   K 4.3 3.7 3.5   CO2 24 27 25    106 106   BUN 18 14 7*   CREATININE 1.0 0.6 0.6       Dietary Orders:  Diet Orders  Report           Diet full liquid: Full Liquid starting at 05/13 0733            Based on the following criteria, this patient qualifies for intravenous to oral conversion:  [x] The patients gastrointestinal tract is functioning (tolerating medications via oral or enteral route for 24 hours and tolerating food or enteral feeds for 24 hours).  [x] The patient is hemodynamically stable for 24 hours (heart rate <100 beats per minute, systolic blood pressure >99 mm Hg, and respiratory rate <20 breaths per minute).  [x] The patient shows clinical improvement (afebrile for at least 24 hours and white blood cell count downtrending or normalized). Additionally, the patient must be non-neutropenic (absolute neutrophil count >500 cells/mm3).  [x] For antimicrobials, the patient has received IV therapy for at least 24 hours.    IV medication (famotidine 20 mg q12h) will be changed to oral medication (famotidine 20 mg BID)    Pharmacist's Name: Brigette  Salvatore  Pharmacist's Extension: [phone]

## 2022-05-14 NOTE — PLAN OF CARE
Problem: Adult Inpatient Plan of Care  Goal: Plan of Care Review  Outcome: Ongoing, Progressing     Problem: Adult Inpatient Plan of Care  Goal: Absence of Hospital-Acquired Illness or Injury  Outcome: Ongoing, Progressing     Problem: Adult Inpatient Plan of Care  Goal: Optimal Comfort and Wellbeing  Outcome: Ongoing, Progressing     Problem: Fall Injury Risk  Goal: Absence of Fall and Fall-Related Injury  Outcome: Ongoing, Progressing     Pt remained free of injury. Call bell and personal items within reach. VSS. Pain controlled with PRN meds. IV fluids infusing. Incision CDI. Chart check complete. Will continue to monitor.

## 2022-05-14 NOTE — ASSESSMENT & PLAN NOTE
72yo F with distal transvserse colon adenocarcinoma who is now s/p open subtotal colectomy, partial gastrectomy, partial pancreatectomy and liver biopsy on 5/10/22    - full liquids  - p.o. pain med  - H/H stable  - OOB, ambulation encouraged  - IS 10xs/hr while awake  - discussed potential discharge tomorrow if continues to improve

## 2022-05-14 NOTE — SUBJECTIVE & OBJECTIVE
Interval History: some reflux now controlled with Pepcid, pain control improving, tolerating liquids advanced to full liquids continuing to have bowel function  Medications:  Continuous Infusions:   dextrose 5 % and 0.45 % NaCl 50 mL/hr at 05/13/22 2045     Scheduled Meds:   acetaminophen  650 mg Oral Q6H    amLODIPine  5 mg Oral Daily    chlorhexidine  10 mL Mouth/Throat BID    famotidine  20 mg Oral BID     PRN Meds:sodium chloride, diphenhydrAMINE, hydrALAZINE, lactated ringers, metoprolol, naloxone, ondansetron, oxyCODONE, oxyCODONE, sodium chloride 0.9%, sodium chloride 0.9%     Review of patient's allergies indicates:  No Known Allergies  Objective:     Vital Signs (Most Recent):  Temp: 98.4 °F (36.9 °C) (05/14/22 1300)  Pulse: 87 (05/14/22 1300)  Resp: 18 (05/14/22 1300)  BP: (!) 141/71 (05/14/22 1300)  SpO2: 99 % (05/14/22 1300)   Vital Signs (24h Range):  Temp:  [97.7 °F (36.5 °C)-99.5 °F (37.5 °C)] 98.4 °F (36.9 °C)  Pulse:  [83-95] 87  Resp:  [16-18] 18  SpO2:  [95 %-99 %] 99 %  BP: (141-161)/(71-90) 141/71     Weight: 80.1 kg (176 lb 9.4 oz)  Body mass index is 26.08 kg/m².    Intake/Output - Last 3 Shifts         05/12 0700  05/13 0659 05/13 0700  05/14 0659 05/14 0700  05/15 0659    P.O. 360 360 200    I.V. (mL/kg) 1938.8 (24.2) 676.5 (8.4)     Blood 358.3      Total Intake(mL/kg) 2657.1 (33.2) 1036.5 (12.9) 200 (2.5)    Urine (mL/kg/hr) 3650 (1.9) 2000 (1)     Emesis/NG output 0 0     Drains 80 110 170    Stool 0  0    Total Output 3730 2110 170    Net -1072.9 -1073.5 +30           Stool Occurrence 1 x  1 x    Emesis Occurrence  1 x             Physical Exam  Vitals and nursing note reviewed.   Constitutional:       General: She is not in acute distress.     Appearance: Normal appearance. She is well-developed. She is not ill-appearing.   HENT:      Head: Normocephalic and atraumatic.      Right Ear: External ear normal.      Left Ear: External ear normal.   Eyes:      Extraocular Movements:  Extraocular movements intact.      Conjunctiva/sclera: Conjunctivae normal.   Cardiovascular:      Rate and Rhythm: Normal rate.   Pulmonary:      Effort: Pulmonary effort is normal. No respiratory distress.   Abdominal:      General: There is no distension.      Palpations: Abdomen is soft.      Tenderness: There is abdominal tenderness (Minimal, expected).      Comments: Incision clean with minimal drainage, no signs of infection. JENI drain serosanguinous.   Musculoskeletal:      Cervical back: Neck supple.   Skin:     General: Skin is warm and dry.   Neurological:      Mental Status: She is alert and oriented to person, place, and time.   Psychiatric:         Behavior: Behavior normal.       Significant Labs:  I have reviewed all pertinent lab results within the past 24 hours.  CBC:   Recent Labs   Lab 05/14/22  0633   WBC 9.92   RBC 4.35   HGB 9.4*   HCT 31.8*   *   MCV 73*   MCH 21.6*   MCHC 29.6*       CMP:   Recent Labs   Lab 05/14/22  0852   *   CALCIUM 9.3      K 4.0   CO2 23      BUN 11   CREATININE 0.7         Significant Diagnostics:  I have reviewed all pertinent imaging results/findings within the past 24 hours.  No new pertinent imaging.

## 2022-05-14 NOTE — PLAN OF CARE
Pt resting in bed, remains free of falls and injuries this shift. VSS. No obvious s/sx of distress noted. Pain controlled with PRN oral medications. Incision to abdomen C/D/I with petra. JENI drain had increase in output today, still serosanguinous drainage physician is aware. POC reviewed with the patient, verbalized understanding. No further needs at this time, call light within reach. Will continue POC as ordered.

## 2022-05-14 NOTE — PROGRESS NOTES
[facility] Surg  General Surgery  Progress Note    Subjective:     History of Present Illness:  70 y/o female with colon adenocarcinoma who presents for definitive surgical management.       Post-Op Info:  Procedure(s) (LRB):  COLECTOMY, PARTIAL (Left)  HYSTEROSCOPY, WITH DILATION AND CURETTAGE OF UTERUS (N/A)  BLOCK, TRANSVERSUS ABDOMINIS PLANE (N/A)  GASTRECTOMY, PARTIAL (N/A)  OMENTECTOMY  CYSTOSCOPY (N/A)  BIOPSY, LIVER   4 Days Post-Op     Interval History: some reflux now controlled with Pepcid, pain control improving, tolerating liquids advanced to full liquids continuing to have bowel function  Medications:  Continuous Infusions:   dextrose 5 % and 0.45 % NaCl 50 mL/hr at 05/13/22 2045     Scheduled Meds:   acetaminophen  650 mg Oral Q6H    amLODIPine  5 mg Oral Daily    chlorhexidine  10 mL Mouth/Throat BID    famotidine  20 mg Oral BID     PRN Meds:sodium chloride, diphenhydrAMINE, hydrALAZINE, lactated ringers, metoprolol, naloxone, ondansetron, oxyCODONE, oxyCODONE, sodium chloride 0.9%, sodium chloride 0.9%     Review of patient's allergies indicates:  No Known Allergies  Objective:     Vital Signs (Most Recent):  Temp: 98.4 °F (36.9 °C) (05/14/22 1300)  Pulse: 87 (05/14/22 1300)  Resp: 18 (05/14/22 1300)  BP: (!) 141/71 (05/14/22 1300)  SpO2: 99 % (05/14/22 1300)   Vital Signs (24h Range):  Temp:  [97.7 °F (36.5 °C)-99.5 °F (37.5 °C)] 98.4 °F (36.9 °C)  Pulse:  [83-95] 87  Resp:  [16-18] 18  SpO2:  [95 %-99 %] 99 %  BP: (141-161)/(71-90) 141/71     Weight: 80.1 kg (176 lb 9.4 oz)  Body mass index is 26.08 kg/m².    Intake/Output - Last 3 Shifts         05/12 0700  05/13 0659 05/13 0700 05/14 0659 05/14 0700  05/15 0659    P.O. 360 360 200    I.V. (mL/kg) 1938.8 (24.2) 676.5 (8.4)     Blood 358.3      Total Intake(mL/kg) 2657.1 (33.2) 1036.5 (12.9) 200 (2.5)    Urine (mL/kg/hr) 3650 (1.9) 2000 (1)     Emesis/NG output 0 0     Drains 80 110 170    Stool 0  0    Total Output 3730 2110 170    Net  -1072.9 -1073.5 +30           Stool Occurrence 1 x  1 x    Emesis Occurrence  1 x             Physical Exam  Vitals and nursing note reviewed.   Constitutional:       General: She is not in acute distress.     Appearance: Normal appearance. She is well-developed. She is not ill-appearing.   HENT:      Head: Normocephalic and atraumatic.      Right Ear: External ear normal.      Left Ear: External ear normal.   Eyes:      Extraocular Movements: Extraocular movements intact.      Conjunctiva/sclera: Conjunctivae normal.   Cardiovascular:      Rate and Rhythm: Normal rate.   Pulmonary:      Effort: Pulmonary effort is normal. No respiratory distress.   Abdominal:      General: There is no distension.      Palpations: Abdomen is soft.      Tenderness: There is abdominal tenderness (Minimal, expected).      Comments: Incision clean with minimal drainage, no signs of infection. JENI drain serosanguinous.   Musculoskeletal:      Cervical back: Neck supple.   Skin:     General: Skin is warm and dry.   Neurological:      Mental Status: She is alert and oriented to person, place, and time.   Psychiatric:         Behavior: Behavior normal.       Significant Labs:  I have reviewed all pertinent lab results within the past 24 hours.  CBC:   Recent Labs   Lab 05/14/22  0633   WBC 9.92   RBC 4.35   HGB 9.4*   HCT 31.8*   *   MCV 73*   MCH 21.6*   MCHC 29.6*       CMP:   Recent Labs   Lab 05/14/22  0852   *   CALCIUM 9.3      K 4.0   CO2 23      BUN 11   CREATININE 0.7         Significant Diagnostics:  I have reviewed all pertinent imaging results/findings within the past 24 hours.  No new pertinent imaging.    Assessment/Plan:     Primary hypertension  Stable, monitor.Transition to PO home meds    Malignant neoplasm of sigmoid colon  70yo F with distal transvserse colon adenocarcinoma who is now s/p open subtotal colectomy, partial gastrectomy, partial pancreatectomy and liver biopsy on 5/10/22    - full  liquids  - p.o. pain med  - H/H stable  - OOB, ambulation encouraged  - IS 10xs/hr while awake  - discussed potential discharge tomorrow if continues to improve        Brennan Tapia MD  General Surgery  [facility]

## 2022-05-15 VITALS
SYSTOLIC BLOOD PRESSURE: 119 MMHG | WEIGHT: 176.56 LBS | DIASTOLIC BLOOD PRESSURE: 69 MMHG | HEART RATE: 101 BPM | BODY MASS INDEX: 26.15 KG/M2 | OXYGEN SATURATION: 98 % | RESPIRATION RATE: 16 BRPM | HEIGHT: 69 IN | TEMPERATURE: 99 F

## 2022-05-15 LAB
ANION GAP SERPL CALC-SCNC: 11 MMOL/L (ref 8–16)
BASOPHILS # BLD AUTO: 0.02 K/UL (ref 0–0.2)
BASOPHILS NFR BLD: 0.3 % (ref 0–1.9)
BUN SERPL-MCNC: 12 MG/DL (ref 8–23)
CALCIUM SERPL-MCNC: 9 MG/DL (ref 8.7–10.5)
CHLORIDE SERPL-SCNC: 101 MMOL/L (ref 95–110)
CO2 SERPL-SCNC: 25 MMOL/L (ref 23–29)
CREAT SERPL-MCNC: 0.6 MG/DL (ref 0.5–1.4)
DIFFERENTIAL METHOD: ABNORMAL
EOSINOPHIL # BLD AUTO: 0.2 K/UL (ref 0–0.5)
EOSINOPHIL NFR BLD: 2 % (ref 0–8)
ERYTHROCYTE [DISTWIDTH] IN BLOOD BY AUTOMATED COUNT: 23.8 % (ref 11.5–14.5)
EST. GFR  (AFRICAN AMERICAN): >60 ML/MIN/1.73 M^2
EST. GFR  (NON AFRICAN AMERICAN): >60 ML/MIN/1.73 M^2
GLUCOSE SERPL-MCNC: 114 MG/DL (ref 70–110)
HCT VFR BLD AUTO: 31.1 % (ref 37–48.5)
HGB BLD-MCNC: 9.1 G/DL (ref 12–16)
IMM GRANULOCYTES # BLD AUTO: 0.03 K/UL (ref 0–0.04)
IMM GRANULOCYTES NFR BLD AUTO: 0.4 % (ref 0–0.5)
LYMPHOCYTES # BLD AUTO: 1.1 K/UL (ref 1–4.8)
LYMPHOCYTES NFR BLD: 15.1 % (ref 18–48)
MAGNESIUM SERPL-MCNC: 1.7 MG/DL (ref 1.6–2.6)
MCH RBC QN AUTO: 21.3 PG (ref 27–31)
MCHC RBC AUTO-ENTMCNC: 29.3 G/DL (ref 32–36)
MCV RBC AUTO: 73 FL (ref 82–98)
MONOCYTES # BLD AUTO: 0.6 K/UL (ref 0.3–1)
MONOCYTES NFR BLD: 8.1 % (ref 4–15)
NEUTROPHILS # BLD AUTO: 5.5 K/UL (ref 1.8–7.7)
NEUTROPHILS NFR BLD: 74.1 % (ref 38–73)
NRBC BLD-RTO: 0 /100 WBC
PHOSPHATE SERPL-MCNC: 3.1 MG/DL (ref 2.7–4.5)
PLATELET # BLD AUTO: 468 K/UL (ref 150–450)
PMV BLD AUTO: 8.8 FL (ref 9.2–12.9)
POTASSIUM SERPL-SCNC: 4.2 MMOL/L (ref 3.5–5.1)
RBC # BLD AUTO: 4.28 M/UL (ref 4–5.4)
SODIUM SERPL-SCNC: 137 MMOL/L (ref 136–145)
WBC # BLD AUTO: 7.42 K/UL (ref 3.9–12.7)

## 2022-05-15 PROCEDURE — 25000003 PHARM REV CODE 250: Performed by: COLON & RECTAL SURGERY

## 2022-05-15 PROCEDURE — 25000003 PHARM REV CODE 250

## 2022-05-15 PROCEDURE — 83735 ASSAY OF MAGNESIUM: CPT | Performed by: COLON & RECTAL SURGERY

## 2022-05-15 PROCEDURE — 85025 COMPLETE CBC W/AUTO DIFF WBC: CPT | Performed by: COLON & RECTAL SURGERY

## 2022-05-15 PROCEDURE — 84100 ASSAY OF PHOSPHORUS: CPT | Performed by: COLON & RECTAL SURGERY

## 2022-05-15 PROCEDURE — 36415 COLL VENOUS BLD VENIPUNCTURE: CPT | Performed by: COLON & RECTAL SURGERY

## 2022-05-15 PROCEDURE — 80048 BASIC METABOLIC PNL TOTAL CA: CPT | Performed by: COLON & RECTAL SURGERY

## 2022-05-15 RX ORDER — OXYCODONE HYDROCHLORIDE 5 MG/1
5 TABLET ORAL EVERY 6 HOURS PRN
Qty: 20 TABLET | Refills: 0 | Status: SHIPPED | OUTPATIENT
Start: 2022-05-15 | End: 2023-02-09

## 2022-05-15 RX ADMIN — OXYCODONE HYDROCHLORIDE 5 MG: 5 TABLET ORAL at 06:05

## 2022-05-15 RX ADMIN — CHLORHEXIDINE GLUCONATE 0.12% ORAL RINSE 10 ML: 1.2 LIQUID ORAL at 09:05

## 2022-05-15 RX ADMIN — FAMOTIDINE 20 MG: 20 TABLET ORAL at 09:05

## 2022-05-15 RX ADMIN — ACETAMINOPHEN 650 MG: 325 TABLET ORAL at 05:05

## 2022-05-15 RX ADMIN — AMLODIPINE BESYLATE 5 MG: 5 TABLET ORAL at 09:05

## 2022-05-15 NOTE — SUBJECTIVE & OBJECTIVE
Interval History: patient tolerated clear liquids however was not brought full liquids despite diet advancement, mild reflux but no nausea or emesis, ambulating, having bowel function    Medications:  Continuous Infusions:   dextrose 5 % and 0.45 % NaCl 50 mL/hr at 05/15/22 0641     Scheduled Meds:   acetaminophen  650 mg Oral Q6H    amLODIPine  5 mg Oral Daily    famotidine  20 mg Oral BID     PRN Meds:sodium chloride, diphenhydrAMINE, hydrALAZINE, lactated ringers, metoprolol, naloxone, ondansetron, oxyCODONE, oxyCODONE, sodium chloride 0.9%, sodium chloride 0.9%     Review of patient's allergies indicates:  No Known Allergies  Objective:     Vital Signs (Most Recent):  Temp: 97.9 °F (36.6 °C) (05/15/22 0742)  Pulse: 80 (05/15/22 0742)  Resp: 16 (05/15/22 0742)  BP: 133/79 (05/15/22 0742)  SpO2: 99 % (05/15/22 0742)   Vital Signs (24h Range):  Temp:  [97.6 °F (36.4 °C)-98.9 °F (37.2 °C)] 97.9 °F (36.6 °C)  Pulse:  [80-87] 80  Resp:  [16-18] 16  SpO2:  [95 %-100 %] 99 %  BP: (133-149)/(71-83) 133/79     Weight: 80.1 kg (176 lb 9.4 oz)  Body mass index is 26.08 kg/m².    Intake/Output - Last 3 Shifts         05/13 0700  05/14 0659 05/14 0700  05/15 0659 05/15 0700  05/16 0659    P.O. 360 760     I.V. (mL/kg) 676.5 (8.4) 1834.6 (22.9)     Blood       Total Intake(mL/kg) 1036.5 (12.9) 2594.6 (32.4)     Urine (mL/kg/hr) 2000 (1) 200 (0.1) 400 (1.4)    Emesis/NG output 0      Drains 110 285     Stool  0     Total Output 2110 485 400    Net -1073.5 +2109.6 -400           Urine Occurrence   50 x    Stool Occurrence  1 x     Emesis Occurrence 1 x              Physical Exam  Vitals and nursing note reviewed.   Constitutional:       General: She is not in acute distress.     Appearance: Normal appearance. She is well-developed. She is not ill-appearing.   HENT:      Head: Normocephalic and atraumatic.      Right Ear: External ear normal.      Left Ear: External ear normal.   Eyes:      Extraocular Movements: Extraocular  movements intact.      Conjunctiva/sclera: Conjunctivae normal.   Cardiovascular:      Rate and Rhythm: Normal rate.   Pulmonary:      Effort: Pulmonary effort is normal. No respiratory distress.   Abdominal:      General: There is no distension.      Palpations: Abdomen is soft.      Tenderness: There is abdominal tenderness (Minimal, expected).      Comments: Incision clean with minimal drainage, no signs of infection. JENI drain serosanguinous.   Musculoskeletal:      Cervical back: Neck supple.   Skin:     General: Skin is warm and dry.   Neurological:      Mental Status: She is alert and oriented to person, place, and time.   Psychiatric:         Behavior: Behavior normal.       Significant Labs:  I have reviewed all pertinent lab results within the past 24 hours.  CBC:   Recent Labs   Lab 05/15/22  0700   WBC 7.42   RBC 4.28   HGB 9.1*   HCT 31.1*   *   MCV 73*   MCH 21.3*   MCHC 29.3*       CMP:   Recent Labs   Lab 05/15/22  0700   *   CALCIUM 9.0      K 4.2   CO2 25      BUN 12   CREATININE 0.6         Significant Diagnostics:  I have reviewed all pertinent imaging results/findings within the past 24 hours.  No new pertinent imaging.

## 2022-05-15 NOTE — ASSESSMENT & PLAN NOTE
70yo F with distal transvserse colon adenocarcinoma who is now s/p open subtotal colectomy, partial gastrectomy, partial pancreatectomy and liver biopsy on 5/10/22    - patient has not received full liquids or regular food yet discussed with nursing staff full liquids advance diet as tolerated  - p.o. pain med  - OOB, ambulation encouraged  - IS 10xs/hr while awake  - possible discharge today if tolerating diet

## 2022-05-15 NOTE — PLAN OF CARE
Problem: Infection  Goal: Absence of Infection Signs and Symptoms  Outcome: Ongoing, Not Progressing     Problem: Skin Injury Risk Increased  Goal: Skin Health and Integrity  Outcome: Ongoing, Not Progressing     Problem: Fall Injury Risk  Goal: Absence of Fall and Fall-Related Injury  Outcome: Ongoing, Not Progressing     Problem: Adult Inpatient Plan of Care  Goal: Optimal Comfort and Wellbeing  Outcome: Ongoing, Not Progressing

## 2022-05-15 NOTE — PLAN OF CARE
Pt resting  in bed, remains free of falls and injuries this shift. VSS. No obvious s/sx of distress noted. Pt tolerating full liquid diet advanced to regular diet. Bingham removed as ordered deu to void by 2200 before discharge. POC reviewed with the patient, verbalized understanding. No further needs at this time, call light within reach. Will continue POC as ordered.

## 2022-05-15 NOTE — NURSING
Bingham catheter removed using clean technique per orders from Pasha Billingsley with urology. 10 cc of water removed from balloon prior to removal. Patient due to void by 2100, verbalized understanding.

## 2022-05-15 NOTE — PROGRESS NOTES
[facility] Surg  General Surgery  Progress Note    Subjective:     History of Present Illness:  70 y/o female with colon adenocarcinoma who presents for definitive surgical management.       Post-Op Info:  Procedure(s) (LRB):  COLECTOMY, PARTIAL (Left)  HYSTEROSCOPY, WITH DILATION AND CURETTAGE OF UTERUS (N/A)  BLOCK, TRANSVERSUS ABDOMINIS PLANE (N/A)  GASTRECTOMY, PARTIAL (N/A)  OMENTECTOMY  CYSTOSCOPY (N/A)  BIOPSY, LIVER   5 Days Post-Op     Interval History: patient tolerated clear liquids however was not brought full liquids despite diet advancement, mild reflux but no nausea or emesis, ambulating, having bowel function    Medications:  Continuous Infusions:   dextrose 5 % and 0.45 % NaCl 50 mL/hr at 05/15/22 0641     Scheduled Meds:   acetaminophen  650 mg Oral Q6H    amLODIPine  5 mg Oral Daily    famotidine  20 mg Oral BID     PRN Meds:sodium chloride, diphenhydrAMINE, hydrALAZINE, lactated ringers, metoprolol, naloxone, ondansetron, oxyCODONE, oxyCODONE, sodium chloride 0.9%, sodium chloride 0.9%     Review of patient's allergies indicates:  No Known Allergies  Objective:     Vital Signs (Most Recent):  Temp: 97.9 °F (36.6 °C) (05/15/22 0742)  Pulse: 80 (05/15/22 0742)  Resp: 16 (05/15/22 0742)  BP: 133/79 (05/15/22 0742)  SpO2: 99 % (05/15/22 0742)   Vital Signs (24h Range):  Temp:  [97.6 °F (36.4 °C)-98.9 °F (37.2 °C)] 97.9 °F (36.6 °C)  Pulse:  [80-87] 80  Resp:  [16-18] 16  SpO2:  [95 %-100 %] 99 %  BP: (133-149)/(71-83) 133/79     Weight: 80.1 kg (176 lb 9.4 oz)  Body mass index is 26.08 kg/m².    Intake/Output - Last 3 Shifts         05/13 0700  05/14 0659 05/14 0700  05/15 0659 05/15 0700  05/16 0659    P.O. 360 760     I.V. (mL/kg) 676.5 (8.4) 1834.6 (22.9)     Blood       Total Intake(mL/kg) 1036.5 (12.9) 2594.6 (32.4)     Urine (mL/kg/hr) 2000 (1) 200 (0.1) 400 (1.4)    Emesis/NG output 0      Drains 110 285     Stool  0     Total Output 2110 485 400    Net -1073.5 +2109.6 -400            Urine Occurrence   50 x    Stool Occurrence  1 x     Emesis Occurrence 1 x              Physical Exam  Vitals and nursing note reviewed.   Constitutional:       General: She is not in acute distress.     Appearance: Normal appearance. She is well-developed. She is not ill-appearing.   HENT:      Head: Normocephalic and atraumatic.      Right Ear: External ear normal.      Left Ear: External ear normal.   Eyes:      Extraocular Movements: Extraocular movements intact.      Conjunctiva/sclera: Conjunctivae normal.   Cardiovascular:      Rate and Rhythm: Normal rate.   Pulmonary:      Effort: Pulmonary effort is normal. No respiratory distress.   Abdominal:      General: There is no distension.      Palpations: Abdomen is soft.      Tenderness: There is abdominal tenderness (Minimal, expected).      Comments: Incision clean with minimal drainage, no signs of infection. JENI drain serosanguinous.   Musculoskeletal:      Cervical back: Neck supple.   Skin:     General: Skin is warm and dry.   Neurological:      Mental Status: She is alert and oriented to person, place, and time.   Psychiatric:         Behavior: Behavior normal.       Significant Labs:  I have reviewed all pertinent lab results within the past 24 hours.  CBC:   Recent Labs   Lab 05/15/22  0700   WBC 7.42   RBC 4.28   HGB 9.1*   HCT 31.1*   *   MCV 73*   MCH 21.3*   MCHC 29.3*       CMP:   Recent Labs   Lab 05/15/22  0700   *   CALCIUM 9.0      K 4.2   CO2 25      BUN 12   CREATININE 0.6         Significant Diagnostics:  I have reviewed all pertinent imaging results/findings within the past 24 hours.  No new pertinent imaging.    Assessment/Plan:     Primary hypertension  Stable, monitor.Transition to PO home meds    Malignant neoplasm of sigmoid colon  70yo F with distal transvserse colon adenocarcinoma who is now s/p open subtotal colectomy, partial gastrectomy, partial pancreatectomy and liver biopsy on 5/10/22    - patient has  not received full liquids or regular food yet discussed with nursing staff full liquids advance diet as tolerated  - p.o. pain med  - OOB, ambulation encouraged  - IS 10xs/hr while awake  - possible discharge today if tolerating diet        Brennan Tapia MD  General Surgery  [facility]

## 2022-05-15 NOTE — DISCHARGE SUMMARY
[facility] Surg  General Surgery  Discharge Summary      Patient Name: Ambar Duenas  MRN: [id number]  Admission Date: 5/10/2022  Hospital Length of Stay: 5 days  Discharge Date and Time:  05/15/2022 4:03 PM  Attending Physician: Maury Liu MD   Discharging Provider: Brennan Tapia MD  Primary Care Provider: Gin Vitale MD    HPI:   70 y/o female with colon adenocarcinoma who presents for definitive surgical management.       Procedure(s) (LRB):  COLECTOMY, PARTIAL (Left)  HYSTEROSCOPY, WITH DILATION AND CURETTAGE OF UTERUS (N/A)  BLOCK, TRANSVERSUS ABDOMINIS PLANE (N/A)  GASTRECTOMY, PARTIAL (N/A)  OMENTECTOMY  CYSTOSCOPY (N/A)  BIOPSY, LIVER      Indwelling Lines/Drains at time of discharge:   Lines/Drains/Airways     Drain  Duration                Closed/Suction Drain 05/10/22 1140 LLQ Bulb 15 Fr. 5 days              Hospital Course:     05/11/2022: POD 1. Doing well postop. No nausea or vomiting. On PCA for pain control. Poor ambulation thus far. No BM/flatus.    05/12/2022:  Postop day 2 Pain controlled on PCA. Still with poor ambulation. Denies nausea and vomiting, still with NG tube. Reports some flatus but no BM.     05/13/2022:  Postop day 3 Pain controlled on PCA. Ambulation improving. Denies nausea and vomiting, tolerated clear liquids after NG tube was removed. Reports some flatus and a BM this morning.     05/14/2022 postop day 4 some reflux now controlled with Pepcid, pain control improving, tolerating liquids advanced to full liquids continuing to have bowel function    05/15/2022 postop day 5 patient tolerated clear liquids however was not brought full liquids despite diet advancement, mild reflux but no nausea or emesis, ambulating, having bowel function    Tolerating regular diet, stable and ready for discharge      Goals of Care Treatment Preferences:  Code Status: Full Code      Consults:     Significant Diagnostic Studies: none    Pending Diagnostic Studies:     Procedure Component  Value Units Date/Time    Specimen to Pathology, Surgery General Surgery [[id number]] Collected: 05/10/22 1250    Order Status: Sent Lab Status: In process Updated: 05/12/22 1121    Specimen: Tissue         Final Active Diagnoses:    Diagnosis Date Noted POA    PRINCIPAL PROBLEM:  Malignant neoplasm of sigmoid colon [C18.7] 04/20/2022 Yes    Primary hypertension [I10] 05/11/2022 Yes      Problems Resolved During this Admission:      Discharged Condition: good    Disposition: Home or Self Care    Follow Up:   Follow-up Information     Maury Liu MD Follow up in 2 week(s).    Specialties: Colon and Rectal Surgery, General Surgery  Contact information:  [address] [place] [address] [place] [address] [phone]                       Patient Instructions:   No discharge procedures on file.  Medications:  Reconciled Home Medications:      Medication List      START taking these medications    oxyCODONE 5 MG immediate release tablet  Commonly known as: ROXICODONE  Take 1 tablet (5 mg total) by mouth every 6 (six) hours as needed for Pain.        CONTINUE taking these medications    amLODIPine 5 MG tablet  Commonly known as: NORVASC  Take 1 tablet (5 mg total) by mouth once daily.     montelukast 10 mg tablet  Commonly known as: SINGULAIR  Take 1 tablet (10 mg total) by mouth every evening.     pantoprazole 40 MG tablet  Commonly known as: PROTONIX  TAKE 1 TABLET(40 MG) BY MOUTH TWICE DAILY        STOP taking these medications    cyclobenzaprine 5 MG tablet  Commonly known as: FLEXERIL     metroNIDAZOLE 500 MG tablet  Commonly known as: FLAGYL     neomycin 500 mg Tab  Commonly known as: MYCIFRADIN     polyethylene glycol 17 gram/dose powder  Commonly known as: GLYCOLAX          Time spent on the discharge of patient: 30 minutes    Brennan Tapia MD  General Surgery  [facility] - Med Surg

## 2022-05-17 ENCOUNTER — PATIENT MESSAGE (OUTPATIENT)
Dept: SURGERY | Facility: CLINIC | Age: 72
End: 2022-05-17
Payer: MEDICARE

## 2022-05-17 LAB
COMMENT: NORMAL
FINAL PATHOLOGIC DIAGNOSIS: NORMAL
GROSS: NORMAL
Lab: NORMAL

## 2022-05-17 NOTE — PHYSICIAN QUERY
PT Name: Ambar Duenas  MR #: [id number]    DOCUMENTATION CLARIFICATION     CDS/: Chang Logan RN      Contact information: [email or web address]    This form is a permanent document in the medical record.     Query Date: May 17, 2022    Dear Provider,  By submitting this query, we are merely seeking further clarification of documentation. Please utilize your independent clinical judgment when addressing the question(s) below.    The medical record contains the following:  Supporting Clinical Findings Location in Medical Record   72yo F with distal transvserse colon adenocarcinoma who is now s/p open subtotal colectomy, partial gastrectomy, partial pancreatectomy and liver biopsy on 5/10/22    Estimated Blood Loss (EBL): * No values recorded between 5/10/2022  9:08 AM and 5/10/2022  9:41 AM 20 cc;  bladder was drained in an in and out fashion with the red rubber catheter.; A 0 degree hysteroscope was advanced through the cervix to the uterine fundus using normal saline as the distension medium.  It appeared that a false passage was created.  Reattempt to find the cavity with the uterine sound or hysterscope or dilators was felt to be unsucessful.  Curettings were obtained after the hysterscope were removed.; The patient had minimal  bleeding at the end of the case.    Once the case was turned over to us, there was noted to be some blood in the Bingham which was initially clear.  Therefore the decision was made to re-consult OBGYN and Urology intraoperatively for evaluation.  The decision was made to intraoperatively and intra-abdominally evaluate the bladder once we were open which we did a later time.;  Attention was turned to the pelvis.  The bladder was filled irrigation and found to have some staining of blood within it.  Therefore Urology was intraoperatively consulted.  They performed a cystoscopy and found a very small superficial bladder injury.  Please see their separately dictated op report for  full details.  They replaced the Bingham catheter.;  Estimated Blood Loss (EBL): 50 mL;     PREOP DIAGNOSIS:  Gross hematuria; POSTOP DIAGNOSIS:  Gross hematuria; Cystoscopy; FINDINGS:  Posterior floor had some mucosal irritation, no fistula or perforation identified, no active bleeding.  Well away from the ureteral orifices. ;  We were contacted intraoperatively due to gross hematuria following hysteroscopy and colectomy.  Intra-abdominal examination demonstrates some ecchymosis to the posterior bladder wall, but no evidence of perforation with bladder insufflated with saline through the Bingham catheter.; Bilateral ureteral orifices were normal in size, shape, caliber and location.  At the midportion of the trigone there appeared to be some mucosal irritation with possible mucosal tear, no definitive muscle identified, but no evidence of perforation, intact bladder and it held insufflation was saline.  No other signs or sources of the hematuria.  Would recommend keeping the Bingham catheter for approximately 5 days and then Bingham catheter voiding trial.  No evidence of perforation though, will have her return in 6 weeks at which point will complete hematuria workup.    Bingham catheter to remain in place until cleared by urology due to concern for bladder injury.    Ok to discharge if passes voiding trials post catheter removal and tolerates regular diet  Pt. is able to void  General Surgery PN 5/11/2022      Obstetrics OP note 5/10/2022             Colon/Rectal Surgery OP note 5/10/2022             Urology OP note 5/10/2022                   General Surgery PN 5/13/2022     General Surgery Discharge Summary  5/15/2022        Please clarify:      [   ] Gross hematuria due to a possible mucosal tear to the bladder (complication of surgery)   [   ] Gross hematuria due to a very small superficial bladder injury (complication of surgery)   [   ] Gross hematuria noted during procedure due to (please specify):_________________    [   ] Gross hematuria noted during procedure with no etiology found    [   ] Other (please specify): __________________   [ x ] Clinically Undetermined       Please document in your progress notes daily for the duration of treatment until resolved and include in your discharge summary.

## 2022-05-17 NOTE — PHYSICIAN QUERY
PT Name: Ambar Duenas  MR #: [id number]    DOCUMENTATION CLARIFICATION      CDS/: Chang Logan RN, BSN       Contact information: [email or web address]    This form is a permanent document in the medical record.      Query Date: May 17, 2022    By submitting this query, we are merely seeking further clarification of documentation. Please utilize your independent clinical judgment when addressing the question(s) below.    The Medical Record contains the following:   Indicators  Supporting Clinical Findings Location in Medical Record   X Anemia documented    Past Medical History:   Diagnosis Date    Anemia, unspecified     General Surgery H&P 5/10/2022   X H&H 7.4/26.1       7.2/25.5     6.1/21.6     7.4/25.4     9.4/31.8     9.1/31.1          Previous Encounter - Labs from previous encounter on 5/9/2022    Labs 5/11/2022    Labs 5/12/2022    Labs 5/13/2022    Labs 5/14/2022    Labs 5/15/2022    BP                    HR      GI bleeding documented      Acute bleeding (Non GI site)     X Transfusion(s) type and screen and administer 1 unit PRBC General Surgery PN 5/12/2022   X Acute/Chronic illness postmenopausal bleeding since 4/2020.  Patient reports intermittent spotting every 3- 4 months.    70yo F with distal transvserse colon adenocarcinoma who is now s/p open subtotal colectomy, partial gastrectomy, partial pancreatectomy and liver biopsy on 5/10/22  Obstetrics H&P 5/10/2022        General Surgery PN 5/11/2022    Treatments     X Other Once the case was turned over to us, there was noted to be some blood in the Bingham which was initially clear.  Therefore the decision was made to re-consult OBGYN and Urology intraoperatively for evaluation  Estimated Blood Loss (EBL): 50 mL      H/H dropped today, type and screen and administer 1 unit PRBC    H/H improved today    H/H stable [place]-rectal Surgery OP report 5/10/2022                  General Surgery PN 5/12/2022      General Surgery PN  5/13/2022    General Surgery PN 5/14/2022     Provider, please specify diagnosis or diagnoses associated with above clinical findings.   [ x  ] Chronic blood loss anemia     [   ] Anemia, unspecified    [   ] Other Hematological Diagnosis (please specify): _________________   [   ] Clinically Undetermined            Please document in your progress notes daily for the duration of treatment, until resolved, and include in your discharge summary.    Form No. [id number]

## 2022-05-18 NOTE — PHYSICIAN QUERY
"PT Name: Ambar Duenas  MR #: [id number]    DOCUMENTATION CLARIFICATION     CDS/: Chang Logan RN         Contact information: [email or web address]    This form is a permanent document in the medical record.     Query Date: May 18, 2022    By submitting this query, we are merely seeking further clarification of documentation.  Please utilize your independent clinical judgment when addressing the question(s) below.    The medical record contains the following:  Pathology Findings Location in Medical Record   Final Pathologic Diagnosis   3. Colon and small bowel, anastomosis:   - Small bowel and colonic mucosa with patchy ischemic features   -  Negative  for dysplasia or malignancy Pathology Report 5/10/2022 (Final result 5/17/2022)       Please clarify:  [  ] Pathology findings noted above are ruled in/confirmed as diagnoses       Please further specify "patchy ischemic features"             ______  Acute             ______  Chronic              ______ Other (please specify):___________________             ______ Unspecified    [  ] Pathology findings noted above are not confirmed as diagnoses   [ x ] Pathology findings noted above are incidental   [  ] Other diagnosis (please specify): ___________   [  ] Clinically Undetermined       Please document in your progress notes daily for the duration of treatment until resolved and include in your discharge summary.    Form No. [id number]            "

## 2022-06-01 ENCOUNTER — OFFICE VISIT (OUTPATIENT)
Dept: SURGERY | Facility: CLINIC | Age: 72
End: 2022-06-01
Payer: MEDICARE

## 2022-06-01 VITALS
WEIGHT: 175.25 LBS | TEMPERATURE: 97 F | HEART RATE: 110 BPM | SYSTOLIC BLOOD PRESSURE: 139 MMHG | DIASTOLIC BLOOD PRESSURE: 81 MMHG | BODY MASS INDEX: 25.88 KG/M2

## 2022-06-01 DIAGNOSIS — C18.9 MALIGNANT NEOPLASM OF COLON, UNSPECIFIED PART OF COLON: Primary | ICD-10-CM

## 2022-06-01 PROCEDURE — 1126F AMNT PAIN NOTED NONE PRSNT: CPT | Mod: CPTII,S$GLB,, | Performed by: COLON & RECTAL SURGERY

## 2022-06-01 PROCEDURE — 3075F PR MOST RECENT SYSTOLIC BLOOD PRESS GE 130-139MM HG: ICD-10-PCS | Mod: CPTII,S$GLB,, | Performed by: COLON & RECTAL SURGERY

## 2022-06-01 PROCEDURE — 3079F PR MOST RECENT DIASTOLIC BLOOD PRESSURE 80-89 MM HG: ICD-10-PCS | Mod: CPTII,S$GLB,, | Performed by: COLON & RECTAL SURGERY

## 2022-06-01 PROCEDURE — 3079F DIAST BP 80-89 MM HG: CPT | Mod: CPTII,S$GLB,, | Performed by: COLON & RECTAL SURGERY

## 2022-06-01 PROCEDURE — 1126F PR PAIN SEVERITY QUANTIFIED, NO PAIN PRESENT: ICD-10-PCS | Mod: CPTII,S$GLB,, | Performed by: COLON & RECTAL SURGERY

## 2022-06-01 PROCEDURE — 99024 PR POST-OP FOLLOW-UP VISIT: ICD-10-PCS | Mod: S$GLB,,, | Performed by: COLON & RECTAL SURGERY

## 2022-06-01 PROCEDURE — 3075F SYST BP GE 130 - 139MM HG: CPT | Mod: CPTII,S$GLB,, | Performed by: COLON & RECTAL SURGERY

## 2022-06-01 PROCEDURE — 1159F PR MEDICATION LIST DOCUMENTED IN MEDICAL RECORD: ICD-10-PCS | Mod: CPTII,S$GLB,, | Performed by: COLON & RECTAL SURGERY

## 2022-06-01 PROCEDURE — 3008F BODY MASS INDEX DOCD: CPT | Mod: CPTII,S$GLB,, | Performed by: COLON & RECTAL SURGERY

## 2022-06-01 PROCEDURE — 99999 PR PBB SHADOW E&M-EST. PATIENT-LVL III: ICD-10-PCS | Mod: PBBFAC,,, | Performed by: COLON & RECTAL SURGERY

## 2022-06-01 PROCEDURE — 3008F PR BODY MASS INDEX (BMI) DOCUMENTED: ICD-10-PCS | Mod: CPTII,S$GLB,, | Performed by: COLON & RECTAL SURGERY

## 2022-06-01 PROCEDURE — 99024 POSTOP FOLLOW-UP VISIT: CPT | Mod: S$GLB,,, | Performed by: COLON & RECTAL SURGERY

## 2022-06-01 PROCEDURE — 99999 PR PBB SHADOW E&M-EST. PATIENT-LVL III: CPT | Mod: PBBFAC,,, | Performed by: COLON & RECTAL SURGERY

## 2022-06-01 PROCEDURE — 1159F MED LIST DOCD IN RCRD: CPT | Mod: CPTII,S$GLB,, | Performed by: COLON & RECTAL SURGERY

## 2022-06-01 NOTE — PROGRESS NOTES
History & Physical    SUBJECTIVE:     Chief Complaint   Patient presents with    Post-op Evaluation     2 week post op// partial colectomy 5/10   Ref: Melanie Ballard MD    History of Present Illness:  Patient is a 71 y.o. female presents forEvaluation of colonic adenocarcinoma.  Patient was undergoing surveillance colonoscopy in 2022 where she was found to have a almost completely obstructing colon mass that was biopsied and resulted as adenocarcinoma.  This was initially thought to be at the sigmoid colon upon imaging with CT scan of the chest abdomen pelvis, no metastatic disease was found but she was found to have a large fungating mass near the splenic flexure with a normal appearing sigmoid.  She has been asymptomatic but before the colonoscopy in since that time.  She denies any fever, chills, nausea, vomiting, hematochezia, melena or abdominal pain.  She only reports a minor amount of reflux that has since resolved with the addition of pantoprazole.  Her only significant past surgical history is  section via lower midline incision.  She denies any family history of colorectal cancer or IBD.  She has had previous colonic polyps on a colonoscopy around 7 years ago.  She reports she is still having normal bowel movements.    5/10/2022: open subtotal colectomy, partial gastrectomy (final path: Stage II T4N0 adenocarcinoma    Interval history:  Since last clinic visit, the patient underwent an open subtotal colectomy with partial gastrectomy on 05/10/2022 with final pathology showing a stage II T4 N0 adenocarcinoma with negative margins.  Patient recovered well in the hospital without issue and was discharged home.  Since discharge she has done very well.  She is tolerating a regular diet without nausea or vomiting.  She reports normal bowel movements without hematochezia or melena with good control.  She denies any fever, chills, nausea, vomiting.  Reports her incision has healed well.    Review  of patient's allergies indicates:  No Known Allergies    Current Outpatient Medications   Medication Sig Dispense Refill    amLODIPine (NORVASC) 5 MG tablet Take 1 tablet (5 mg total) by mouth once daily. 90 tablet 3    montelukast (SINGULAIR) 10 mg tablet Take 1 tablet (10 mg total) by mouth every evening. 30 tablet 3    pantoprazole (PROTONIX) 40 MG tablet TAKE 1 TABLET(40 MG) BY MOUTH TWICE DAILY 180 tablet 0    oxyCODONE (ROXICODONE) 5 MG immediate release tablet Take 1 tablet (5 mg total) by mouth every 6 (six) hours as needed for Pain. (Patient not taking: Reported on 6/1/2022) 20 tablet 0     No current facility-administered medications for this visit.       Past Medical History:   Diagnosis Date    Anemia, unspecified     Fibroids     GERD (gastroesophageal reflux disease)     Hyperlipidemia     Hypertension      Past Surgical History:   Procedure Laterality Date    COLONOSCOPY N/A 3/28/2022    Procedure: COLONOSCOPY;  Surgeon: Melanie Ballard MD;  Location: [facility];  Service: Endoscopy;  Laterality: N/A;    ESOPHAGOGASTRODUODENOSCOPY N/A 3/28/2022    Procedure: ESOPHAGOGASTRODUODENOSCOPY (EGD);  Surgeon: Melanie Ballard MD;  Location: [facility];  Service: Endoscopy;  Laterality: N/A;    HEMORRHOID SURGERY      HYSTEROSCOPY WITH DILATION AND CURETTAGE OF UTERUS N/A 5/10/2022    Procedure: HYSTEROSCOPY, WITH DILATION AND CURETTAGE OF UTERUS;  Surgeon: Madelyn West MD;  Location: [facility];  Service: OB/GYN;  Laterality: N/A;    INJECTION OF ANESTHETIC AGENT INTO TISSUE PLANE DEFINED BY TRANSVERSUS ABDOMINIS MUSCLE N/A 5/10/2022    Procedure: BLOCK, TRANSVERSUS ABDOMINIS PLANE;  Surgeon: Maury Liu MD;  Location: [facility] OR;  Service: General;  Laterality: N/A;    LIVER BIOPSY  5/10/2022    Procedure: BIOPSY, LIVER;  Surgeon: Maury Liu MD;  Location: [facility] OR;  Service: General;;    OMENTECTOMY  5/10/2022    Procedure: OMENTECTOMY;  Surgeon: Maury Liu MD;   Location: [facility] OR;  Service: General;;  partial    PARTIAL GASTRECTOMY N/A 5/10/2022    Procedure: GASTRECTOMY, PARTIAL;  Surgeon: Maury Liu MD;  Location: [facility] OR;  Service: General;  Laterality: N/A;    SUBTOTAL COLECTOMY Left 5/10/2022    Procedure: COLECTOMY, PARTIAL;  Surgeon: Maury Liu MD;  Location: [facility] OR;  Service: General;  Laterality: Left;  LEFT VS SUBTOTAl vs EXTENDED RIGHT     No family history on file.  Social History     Tobacco Use    Smoking status: Never Smoker    Smokeless tobacco: Never Used   Substance Use Topics    Alcohol use: Yes     Alcohol/week: 2.0 standard drinks     Types: 2 Glasses of wine per week     Comment: occ    Drug use: Never        Review of Systems:  Review of Systems   Constitutional: Negative for activity change, appetite change, chills, fatigue, fever and unexpected weight change.   HENT: Negative for congestion, ear pain, sore throat and trouble swallowing.    Eyes: Negative for pain, redness and itching.   Respiratory: Negative for cough, shortness of breath and wheezing.    Cardiovascular: Negative for chest pain, palpitations and leg swelling.   Gastrointestinal: Negative for abdominal distention, abdominal pain, anal bleeding, blood in stool, constipation, diarrhea, nausea, rectal pain and vomiting.   Endocrine: Negative for cold intolerance, heat intolerance and polyuria.   Genitourinary: Negative for dysuria, flank pain, frequency and hematuria.   Musculoskeletal: Negative for gait problem, joint swelling and neck pain.   Skin: Negative for color change, rash and wound.   Allergic/Immunologic: Negative for environmental allergies and immunocompromised state.   Neurological: Negative for dizziness, speech difficulty, weakness and numbness.   Psychiatric/Behavioral: Negative for agitation, confusion and hallucinations.       OBJECTIVE:     Vital Signs (Most Recent)  Temp: 97.4 °F (36.3 °C) (06/01/22 1436)  Pulse: 110 (06/01/22 1436)  BP:  139/81 (06/01/22 1436)     79.5 kg (175 lb 4.3 oz)     Physical Exam:  Physical Exam  Constitutional:       Appearance: She is well-developed.   HENT:      Head: Normocephalic and atraumatic.   Eyes:      Conjunctiva/sclera: Conjunctivae normal.   Neck:      Thyroid: No thyromegaly.   Cardiovascular:      Rate and Rhythm: Normal rate and regular rhythm.   Pulmonary:      Effort: Pulmonary effort is normal. No respiratory distress.   Abdominal:      Comments: Soft, nondistended, nontender; well-healed midline incision (staples removed at bedside today)   Musculoskeletal:         General: No tenderness. Normal range of motion.      Cervical back: Normal range of motion.   Skin:     General: Skin is warm and dry.      Capillary Refill: Capillary refill takes less than 2 seconds.      Findings: No rash.   Neurological:      Mental Status: She is alert and oriented to person, place, and time.         Laboratory  Lab Results   Component Value Date    WBC 7.42 05/15/2022    HGB 9.1 (L) 05/15/2022    HCT 31.1 (L) 05/15/2022     (H) 05/15/2022    CHOL 211 (H) 03/08/2022    TRIG 65 03/08/2022    HDL 54 03/08/2022    ALT 8 (L) 04/20/2022    AST 10 04/20/2022     05/15/2022    K 4.2 05/15/2022     05/15/2022    CREATININE 0.6 05/15/2022    BUN 12 05/15/2022    CO2 25 05/15/2022    TSH 1.710 04/20/2022       CEA: 3.2 (April '22)    Diagnostic Results:  CT: Reviewed  Colonoscopy: reviewed    CT:  FINDINGS:  CHEST:     No suspicious nodule or mass within either lung.  No focal consolidation.  No pneumothorax or pleural effusion.  Tracheobronchial tree appears normal.     Thyroid is unremarkable.  No supraclavicular, axillary, mediastinal, or hilar lymphadenopathy.     Thoracic aorta is nonaneurysmal.  Pulmonary arteries are normal in size.  Heart is normal in size.  No pericardial effusion.     ABD/PELVIS:     No focal hepatic lesion.  Spleen and pancreas are normal.  No intrahepatic or extrahepatic biliary  ductal dilatation.  Gallbladder is unremarkable.  Portal, hepatic, and superior mesenteric veins are patent.  Right adrenal gland is normal.  Mild nonspecific thickening of the left adrenal gland.     There are few bilateral nonobstructing renal stones measuring up to 3 mm.  No suspicious renal lesion.  No hydronephrosis.  Bladder is normal.     Uterus demonstrates unremarkable appearance.  Nonspecific radiopaque object within the vagina near the cervix.  No adnexal mass.     Multiple diverticula are noted within the colon.  There is a large circumferential mass involving the splenic flexure of the colon indicative of biopsy-proven colon cancer.  There are peripherally enhancing soft tissue deposits extending into the mesenteric fat adjacent to this colonic mass suspicious for extra colonic invasion (series 3, image 65).  One of these peripherally enhancing soft tissue deposits abuts the greater curvature of the stomach with no intervening fat plane suggesting potential for invasion (series 3, image 63).  No extraluminal gas to indicate site of colonic perforation at the site of the large mass.  There is fat stranding within the left upper quadrant and multiple rounded upper abdominal mesenteric nodules/lymph nodes also suspicious for disease spread.  For example, 9 mm rounded mesenteric soft tissue nodule or node (series 3, image 80).  Multiple rounded upper mesenteric nodules/nodes (series 3, image 72).     Abdominal aorta is nonaneurysmal.  Branch vessels of the abdominal aorta are patent.  Small fat containing umbilical hernia.     No suspicious lytic or blastic osseous lesion.  There is grade 1 anterolisthesis of L4 on L5 and L5 on S1.     Impression:     1.  Large circumferential mass involving the splenic flexure of the colon indicative of biopsy-proven colon cancer.  Multiple peripherally enhancing soft tissue deposits extend into the mesenteric fat adjacent to the colonic mass consistent with extracolonic  invasion.  There is also fat stranding within the left upper quadrant as well as multiple rounded upper abdominal mesenteric lymph nodes/nodules suspicious for possible metastatic disease.     2.  No evidence of metastatic disease within the chest.      PATHOLOGY:  1. Stomach,  small bowel (2.5 cm) right,  transverse and descending colon   (54 cm), partial colectomy:   - Adenocarcinoma (7.4 cm)   - Appendix with fibrous obliteration of the appendiceal tip, negative for   malignancy   - Fifty lymph nodes, negative for metastatic carcinoma (0/50)   - See synoptic report for details and complete pathologic staging   2. Liver, biopsy:   - Benign liver probe parenchyma with subcapsular fibrotic nodule   -  Negative  for metastatic carcinoma   3. Colon and small bowel, anastomosis:   - Small bowel and colonic mucosa with patchy ischemic features   -  Negative  for dysplasia or malignancy   4. Omentum, omentectomy:   - Benign fibrofatty tissue   -  Negative  for metastatic carcinoma     SURGICAL PATHOLOGY CASE SUMMARY/OPTIC REPORT -COLON AND RECTUM   Procedure:   Right, transverse and left colon   Macroscopic evaluation of mesorectum:   Not identified   Tumor site:   Transverse and descending colon   Histologic type:   Adenocarcinoma with mucinous features   Histologic grade:   G2, moderately differentiated   Tumor size:   7.4 cm in greatest dimension   Multiple primary sites:   Not applicable   Tumor extent:   Tumor directly invades the muscularis of the stomach   Macroscopic tumor perforation:   Not identified   Lymphovascular invasion:   Not identified   Perineural invasion:   Not identified   Treatment effect:   No known presurgical therapy   Margin status for invasive carcinoma:   All margins  negative  for invasive   carcinoma   Closest margin to invasive carcinoma:  Radial -3 mm to nearest invasive   tumor   Regional lymph node status:   Regional lymph nodes present and all  negative   for tumor   Number lymph nodes  examined:  50   Tumor deposits:   Not identified   Distant metastasis:   Not identified   Ancillary studies:   Performed on previous biopsy BRS- and show intact   MMR by immunohistochemistry (see separate report for details)   PATHOLOGIC STAGE CLASSIFICATION (pTNM, AJCC 8th Edition): pT4b, pN0    ASSESSMENT/PLAN:     72yo F with colonic adenocarcinoma now s/p subtotal colectomy with partial gastrectomy on 5/10/22 with final path showing Stage II T4bN0 adenocarcinoma with negative margins    - no further surgical intervention at this time  - Referral placed to [facility] for consideration of adjuvant chemotx given perforated Stage II tumor  - will be happy to place Medip[facility] for chemotx administration if patient becomes agreeable to this  - RTC 3 months for surveillance    Maury Liu MD  Colon and Rectal Surgery  [facility]

## 2022-06-02 ENCOUNTER — TELEPHONE (OUTPATIENT)
Dept: HEMATOLOGY/ONCOLOGY | Facility: CLINIC | Age: 72
End: 2022-06-02
Payer: MEDICARE

## 2022-06-02 NOTE — TELEPHONE ENCOUNTER
Called and spoke with pt about referral to med onc by Dr. Liu. Explained my role as NN and offered appt. Pt states she will be out of town for 3 weeks and also needs to discuss with her sons which provider she'd like to see. Asked if she can call me back in the next few days, after she does her research. Gave her my direct number to call, answered all questions. She voiced understanding.

## 2022-06-04 DIAGNOSIS — I10 ESSENTIAL HYPERTENSION: ICD-10-CM

## 2022-06-04 RX ORDER — AMLODIPINE BESYLATE 5 MG/1
TABLET ORAL
Qty: 90 TABLET | Refills: 3 | Status: SHIPPED | OUTPATIENT
Start: 2022-06-04 | End: 2023-05-12 | Stop reason: SDUPTHER

## 2022-06-04 NOTE — TELEPHONE ENCOUNTER
No new care gaps identified.  [facility] Embedded Care Gaps. Reference number: [id number]. 6/04/2022   10:07:37 AM JWT

## 2022-06-04 NOTE — TELEPHONE ENCOUNTER
Refill Authorization Note   Ambar Duenas  is requesting a refill authorization.  Brief Assessment and Rationale for Refill:  Approve     Medication Therapy Plan:       Medication Reconciliation Completed: No   Comments:     No Care Gaps recommended.     Note composed:1:11 PM 06/04/2022

## 2022-06-30 ENCOUNTER — TELEPHONE (OUTPATIENT)
Dept: HEMATOLOGY/ONCOLOGY | Facility: CLINIC | Age: 72
End: 2022-06-30
Payer: MEDICARE

## 2022-06-30 NOTE — TELEPHONE ENCOUNTER
Called patient again regarding referral to medical oncology. Patient states she is back in town after July 6th. Offered to tentatively schedule her with someone for mid/late July to ensure she doesn't have nay unnecessary delay when she returns, but she refused. States she would like to wait to schedule until she returns and that she has our number and will call us back.

## 2022-08-09 ENCOUNTER — PATIENT MESSAGE (OUTPATIENT)
Dept: ADMINISTRATIVE | Facility: HOSPITAL | Age: 72
End: 2022-08-09
Payer: MEDICARE

## 2022-08-24 ENCOUNTER — TELEPHONE (OUTPATIENT)
Dept: HEMATOLOGY/ONCOLOGY | Facility: CLINIC | Age: 72
End: 2022-08-24
Payer: MEDICARE

## 2022-08-24 NOTE — TELEPHONE ENCOUNTER
Returned call to pt about scheduling appt with oncologist. States she will wait until she sees Dr. Liu on 9/12, since she found out Dr. Limon is leaving the practice. All questions answered, she has my call back number should she have any questions.

## 2022-08-25 ENCOUNTER — PES CALL (OUTPATIENT)
Dept: ADMINISTRATIVE | Facility: CLINIC | Age: 72
End: 2022-08-25
Payer: MEDICARE

## 2022-09-09 ENCOUNTER — TELEPHONE (OUTPATIENT)
Dept: OBSTETRICS AND GYNECOLOGY | Facility: CLINIC | Age: 72
End: 2022-09-09
Payer: MEDICARE

## 2022-09-09 NOTE — TELEPHONE ENCOUNTER
----- Message from Olman Mello sent at 9/9/2022  3:21 PM CDT -----  Contact: Pt  Type:  Sooner Apoointment Request    Caller is requesting a sooner appointment.  Caller declined first available appointment listed below.  Caller will not accept being placed on the waitlist and is requesting a message be sent to doctor.  Name of Caller: pt   When is the first available appointment?10/24 - pt has an appt   Symptoms: consultation per pt   Would the patient rather a call back or a response via [facility]? Phone & portal   Best Call Back Number: [phone]  Additional Information:

## 2022-09-12 ENCOUNTER — OFFICE VISIT (OUTPATIENT)
Dept: SURGERY | Facility: CLINIC | Age: 72
End: 2022-09-12
Payer: MEDICARE

## 2022-09-12 ENCOUNTER — LAB VISIT (OUTPATIENT)
Dept: LAB | Facility: HOSPITAL | Age: 72
End: 2022-09-12
Attending: COLON & RECTAL SURGERY
Payer: MEDICARE

## 2022-09-12 ENCOUNTER — PATIENT MESSAGE (OUTPATIENT)
Dept: SURGERY | Facility: CLINIC | Age: 72
End: 2022-09-12

## 2022-09-12 VITALS
WEIGHT: 189.63 LBS | BODY MASS INDEX: 28 KG/M2 | HEART RATE: 83 BPM | TEMPERATURE: 98 F | SYSTOLIC BLOOD PRESSURE: 136 MMHG | DIASTOLIC BLOOD PRESSURE: 75 MMHG

## 2022-09-12 DIAGNOSIS — C18.9 MALIGNANT NEOPLASM OF COLON, UNSPECIFIED PART OF COLON: ICD-10-CM

## 2022-09-12 DIAGNOSIS — C18.9 MALIGNANT NEOPLASM OF COLON, UNSPECIFIED PART OF COLON: Primary | ICD-10-CM

## 2022-09-12 DIAGNOSIS — C18.7 MALIGNANT NEOPLASM OF SIGMOID COLON: ICD-10-CM

## 2022-09-12 LAB
CEA SERPL-MCNC: 2.1 NG/ML (ref 0–5)
CREAT SERPL-MCNC: 0.7 MG/DL (ref 0.5–1.4)
EST. GFR  (NO RACE VARIABLE): >60 ML/MIN/1.73 M^2

## 2022-09-12 PROCEDURE — 1159F MED LIST DOCD IN RCRD: CPT | Mod: CPTII,S$GLB,, | Performed by: COLON & RECTAL SURGERY

## 2022-09-12 PROCEDURE — 3075F PR MOST RECENT SYSTOLIC BLOOD PRESS GE 130-139MM HG: ICD-10-PCS | Mod: CPTII,S$GLB,, | Performed by: COLON & RECTAL SURGERY

## 2022-09-12 PROCEDURE — 1159F PR MEDICATION LIST DOCUMENTED IN MEDICAL RECORD: ICD-10-PCS | Mod: CPTII,S$GLB,, | Performed by: COLON & RECTAL SURGERY

## 2022-09-12 PROCEDURE — 3078F PR MOST RECENT DIASTOLIC BLOOD PRESSURE < 80 MM HG: ICD-10-PCS | Mod: CPTII,S$GLB,, | Performed by: COLON & RECTAL SURGERY

## 2022-09-12 PROCEDURE — 99999 PR PBB SHADOW E&M-EST. PATIENT-LVL III: ICD-10-PCS | Mod: PBBFAC,,, | Performed by: COLON & RECTAL SURGERY

## 2022-09-12 PROCEDURE — 82378 CARCINOEMBRYONIC ANTIGEN: CPT | Performed by: COLON & RECTAL SURGERY

## 2022-09-12 PROCEDURE — 3075F SYST BP GE 130 - 139MM HG: CPT | Mod: CPTII,S$GLB,, | Performed by: COLON & RECTAL SURGERY

## 2022-09-12 PROCEDURE — 99214 OFFICE O/P EST MOD 30 MIN: CPT | Mod: S$GLB,,, | Performed by: COLON & RECTAL SURGERY

## 2022-09-12 PROCEDURE — 3008F BODY MASS INDEX DOCD: CPT | Mod: CPTII,S$GLB,, | Performed by: COLON & RECTAL SURGERY

## 2022-09-12 PROCEDURE — 82565 ASSAY OF CREATININE: CPT | Performed by: COLON & RECTAL SURGERY

## 2022-09-12 PROCEDURE — 3078F DIAST BP <80 MM HG: CPT | Mod: CPTII,S$GLB,, | Performed by: COLON & RECTAL SURGERY

## 2022-09-12 PROCEDURE — 99214 PR OFFICE/OUTPT VISIT, EST, LEVL IV, 30-39 MIN: ICD-10-PCS | Mod: S$GLB,,, | Performed by: COLON & RECTAL SURGERY

## 2022-09-12 PROCEDURE — 1126F PR PAIN SEVERITY QUANTIFIED, NO PAIN PRESENT: ICD-10-PCS | Mod: CPTII,S$GLB,, | Performed by: COLON & RECTAL SURGERY

## 2022-09-12 PROCEDURE — 3008F PR BODY MASS INDEX (BMI) DOCUMENTED: ICD-10-PCS | Mod: CPTII,S$GLB,, | Performed by: COLON & RECTAL SURGERY

## 2022-09-12 PROCEDURE — 99999 PR PBB SHADOW E&M-EST. PATIENT-LVL III: CPT | Mod: PBBFAC,,, | Performed by: COLON & RECTAL SURGERY

## 2022-09-12 PROCEDURE — 1126F AMNT PAIN NOTED NONE PRSNT: CPT | Mod: CPTII,S$GLB,, | Performed by: COLON & RECTAL SURGERY

## 2022-09-12 NOTE — PROGRESS NOTES
History & Physical    SUBJECTIVE:     No chief complaint on file.  Ref: Melanie Ballard MD    History of Present Illness:  Patient is a 72 y.o. female presents forEvaluation of colonic adenocarcinoma.  Patient was undergoing surveillance colonoscopy in 2022 where she was found to have a almost completely obstructing colon mass that was biopsied and resulted as adenocarcinoma.  This was initially thought to be at the sigmoid colon upon imaging with CT scan of the chest abdomen pelvis, no metastatic disease was found but she was found to have a large fungating mass near the splenic flexure with a normal appearing sigmoid.  She has been asymptomatic but before the colonoscopy in since that time.  She denies any fever, chills, nausea, vomiting, hematochezia, melena or abdominal pain.  She only reports a minor amount of reflux that has since resolved with the addition of pantoprazole.  Her only significant past surgical history is  section via lower midline incision.  She denies any family history of colorectal cancer or IBD.  She has had previous colonic polyps on a colonoscopy around 7 years ago.  She reports she is still having normal bowel movements.    5/10/2022: open subtotal colectomy, partial gastrectomy (final path: Stage II T4N0 adenocarcinoma    Interval history:  Since last clinic visit, the patient has done well. Is tolerating a regular diet without nausea or vomiting. Denies fever, chills, hematochezia or melena. Has not seen medical oncology yet as she is still evaluating which provider she would like to see.     Review of patient's allergies indicates:  No Known Allergies    Current Outpatient Medications   Medication Sig Dispense Refill    amLODIPine (NORVASC) 5 MG tablet TAKE 1 TABLET(5 MG) BY MOUTH EVERY DAY 90 tablet 3    montelukast (SINGULAIR) 10 mg tablet Take 1 tablet (10 mg total) by mouth every evening. 30 tablet 3    pantoprazole (PROTONIX) 40 MG tablet TAKE 1 TABLET(40 MG) BY  MOUTH TWICE DAILY 180 tablet 0    oxyCODONE (ROXICODONE) 5 MG immediate release tablet Take 1 tablet (5 mg total) by mouth every 6 (six) hours as needed for Pain. (Patient not taking: No sig reported) 20 tablet 0     No current facility-administered medications for this visit.       Past Medical History:   Diagnosis Date    Anemia, unspecified     Fibroids     GERD (gastroesophageal reflux disease)     Hyperlipidemia     Hypertension      Past Surgical History:   Procedure Laterality Date    COLONOSCOPY N/A 3/28/2022    Procedure: COLONOSCOPY;  Surgeon: Melanie Ballard MD;  Location: [facility];  Service: Endoscopy;  Laterality: N/A;    ESOPHAGOGASTRODUODENOSCOPY N/A 3/28/2022    Procedure: ESOPHAGOGASTRODUODENOSCOPY (EGD);  Surgeon: Melanie Ballard MD;  Location: [facility];  Service: Endoscopy;  Laterality: N/A;    HEMORRHOID SURGERY      HYSTEROSCOPY WITH DILATION AND CURETTAGE OF UTERUS N/A 5/10/2022    Procedure: HYSTEROSCOPY, WITH DILATION AND CURETTAGE OF UTERUS;  Surgeon: Madelyn West MD;  Location: [facility];  Service: OB/GYN;  Laterality: N/A;    INJECTION OF ANESTHETIC AGENT INTO TISSUE PLANE DEFINED BY TRANSVERSUS ABDOMINIS MUSCLE N/A 5/10/2022    Procedure: BLOCK, TRANSVERSUS ABDOMINIS PLANE;  Surgeon: Maury Liu MD;  Location: [facility];  Service: General;  Laterality: N/A;    LIVER BIOPSY  5/10/2022    Procedure: BIOPSY, LIVER;  Surgeon: Maury Liu MD;  Location: [facility] OR;  Service: General;;    OMENTECTOMY  5/10/2022    Procedure: OMENTECTOMY;  Surgeon: Maury Liu MD;  Location: [facility] OR;  Service: General;;  partial    PARTIAL GASTRECTOMY N/A 5/10/2022    Procedure: GASTRECTOMY, PARTIAL;  Surgeon: Maury Liu MD;  Location: [facility] OR;  Service: General;  Laterality: N/A;    SUBTOTAL COLECTOMY Left 5/10/2022    Procedure: COLECTOMY, PARTIAL;  Surgeon: Maury Liu MD;  Location: [facility] OR;  Service: General;  Laterality: Left;  LEFT VS SUBTOTAl vs EXTENDED RIGHT      No family history on file.  Social History     Tobacco Use    Smoking status: Never    Smokeless tobacco: Never   Substance Use Topics    Alcohol use: Yes     Alcohol/week: 2.0 standard drinks     Types: 2 Glasses of wine per week     Comment: occ    Drug use: Never        Review of Systems:  Review of Systems   Constitutional:  Negative for activity change, appetite change, chills, fatigue, fever and unexpected weight change.   HENT:  Negative for congestion, ear pain, sore throat and trouble swallowing.    Eyes:  Negative for pain, redness and itching.   Respiratory:  Negative for cough, shortness of breath and wheezing.    Cardiovascular:  Negative for chest pain, palpitations and leg swelling.   Gastrointestinal:  Negative for abdominal distention, abdominal pain, anal bleeding, blood in stool, constipation, diarrhea, nausea, rectal pain and vomiting.   Endocrine: Negative for cold intolerance, heat intolerance and polyuria.   Genitourinary:  Negative for dysuria, flank pain, frequency and hematuria.   Musculoskeletal:  Negative for gait problem, joint swelling and neck pain.   Skin:  Negative for color change, rash and wound.   Allergic/Immunologic: Negative for environmental allergies and immunocompromised state.   Neurological:  Negative for dizziness, speech difficulty, weakness and numbness.   Psychiatric/Behavioral:  Negative for agitation, confusion and hallucinations.      OBJECTIVE:     Vital Signs (Most Recent)  Temp: 97.7 °F (36.5 °C) (09/12/22 1203)  Pulse: 83 (09/12/22 1203)  BP: 136/75 (09/12/22 1203)     86 kg (189 lb 9.5 oz)     Physical Exam:  Physical Exam  Constitutional:       Appearance: She is well-developed.   HENT:      Head: Normocephalic and atraumatic.   Eyes:      Conjunctiva/sclera: Conjunctivae normal.   Neck:      Thyroid: No thyromegaly.   Cardiovascular:      Rate and Rhythm: Normal rate and regular rhythm.   Pulmonary:      Effort: Pulmonary effort is normal. No respiratory  distress.   Abdominal:      Comments: Soft, nondistended, nontender; well-healed midline incision   Musculoskeletal:         General: No tenderness. Normal range of motion.      Cervical back: Normal range of motion.   Skin:     General: Skin is warm and dry.      Capillary Refill: Capillary refill takes less than 2 seconds.      Findings: No rash.   Neurological:      Mental Status: She is alert and oriented to person, place, and time.       Laboratory  Lab Results   Component Value Date    WBC 7.42 05/15/2022    HGB 9.1 (L) 05/15/2022    HCT 31.1 (L) 05/15/2022     (H) 05/15/2022    CHOL 211 (H) 03/08/2022    TRIG 65 03/08/2022    HDL 54 03/08/2022    ALT 8 (L) 04/20/2022    AST 10 04/20/2022     05/15/2022    K 4.2 05/15/2022     05/15/2022    CREATININE 0.6 05/15/2022    BUN 12 05/15/2022    CO2 25 05/15/2022    TSH 1.710 04/20/2022       CEA: 3.2 (April '22)    Diagnostic Results:  CT: Reviewed  Colonoscopy: reviewed    CT:  FINDINGS:  CHEST:     No suspicious nodule or mass within either lung.  No focal consolidation.  No pneumothorax or pleural effusion.  Tracheobronchial tree appears normal.     Thyroid is unremarkable.  No supraclavicular, axillary, mediastinal, or hilar lymphadenopathy.     Thoracic aorta is nonaneurysmal.  Pulmonary arteries are normal in size.  Heart is normal in size.  No pericardial effusion.     ABD/PELVIS:     No focal hepatic lesion.  Spleen and pancreas are normal.  No intrahepatic or extrahepatic biliary ductal dilatation.  Gallbladder is unremarkable.  Portal, hepatic, and superior mesenteric veins are patent.  Right adrenal gland is normal.  Mild nonspecific thickening of the left adrenal gland.     There are few bilateral nonobstructing renal stones measuring up to 3 mm.  No suspicious renal lesion.  No hydronephrosis.  Bladder is normal.     Uterus demonstrates unremarkable appearance.  Nonspecific radiopaque object within the vagina near the cervix.  No  adnexal mass.     Multiple diverticula are noted within the colon.  There is a large circumferential mass involving the splenic flexure of the colon indicative of biopsy-proven colon cancer.  There are peripherally enhancing soft tissue deposits extending into the mesenteric fat adjacent to this colonic mass suspicious for extra colonic invasion (series 3, image 65).  One of these peripherally enhancing soft tissue deposits abuts the greater curvature of the stomach with no intervening fat plane suggesting potential for invasion (series 3, image 63).  No extraluminal gas to indicate site of colonic perforation at the site of the large mass.  There is fat stranding within the left upper quadrant and multiple rounded upper abdominal mesenteric nodules/lymph nodes also suspicious for disease spread.  For example, 9 mm rounded mesenteric soft tissue nodule or node (series 3, image 80).  Multiple rounded upper mesenteric nodules/nodes (series 3, image 72).     Abdominal aorta is nonaneurysmal.  Branch vessels of the abdominal aorta are patent.  Small fat containing umbilical hernia.     No suspicious lytic or blastic osseous lesion.  There is grade 1 anterolisthesis of L4 on L5 and L5 on S1.     Impression:     1.  Large circumferential mass involving the splenic flexure of the colon indicative of biopsy-proven colon cancer.  Multiple peripherally enhancing soft tissue deposits extend into the mesenteric fat adjacent to the colonic mass consistent with extracolonic invasion.  There is also fat stranding within the left upper quadrant as well as multiple rounded upper abdominal mesenteric lymph nodes/nodules suspicious for possible metastatic disease.     2.  No evidence of metastatic disease within the chest.      PATHOLOGY:  1. Stomach,  small bowel (2.5 cm) right,  transverse and descending colon   (54 cm), partial colectomy:   - Adenocarcinoma (7.4 cm)   - Appendix with fibrous obliteration of the appendiceal tip,  negative for   malignancy   - Fifty lymph nodes, negative for metastatic carcinoma (0/50)   - See synoptic report for details and complete pathologic staging   2. Liver, biopsy:   - Benign liver probe parenchyma with subcapsular fibrotic nodule   -  Negative  for metastatic carcinoma   3. Colon and small bowel, anastomosis:   - Small bowel and colonic mucosa with patchy ischemic features   -  Negative  for dysplasia or malignancy   4. Omentum, omentectomy:   - Benign fibrofatty tissue   -  Negative  for metastatic carcinoma     SURGICAL PATHOLOGY CASE SUMMARY/OPTIC REPORT -COLON AND RECTUM   Procedure:   Right, transverse and left colon   Macroscopic evaluation of mesorectum:   Not identified   Tumor site:   Transverse and descending colon   Histologic type:   Adenocarcinoma with mucinous features   Histologic grade:   G2, moderately differentiated   Tumor size:   7.4 cm in greatest dimension   Multiple primary sites:   Not applicable   Tumor extent:   Tumor directly invades the muscularis of the stomach   Macroscopic tumor perforation:   Not identified   Lymphovascular invasion:   Not identified   Perineural invasion:   Not identified   Treatment effect:   No known presurgical therapy   Margin status for invasive carcinoma:   All margins  negative  for invasive   carcinoma   Closest margin to invasive carcinoma:  Radial -3 mm to nearest invasive   tumor   Regional lymph node status:   Regional lymph nodes present and all  negative   for tumor   Number lymph nodes examined:  50   Tumor deposits:   Not identified   Distant metastasis:   Not identified   Ancillary studies:   Performed on previous biopsy BRS- and show intact   MMR by immunohistochemistry (see separate report for details)   PATHOLOGIC STAGE CLASSIFICATION (pTNM, AJCC 8th Edition): pT4b, pN0    ASSESSMENT/PLAN:     71yo F with colonic adenocarcinoma now s/p subtotal colectomy with partial gastrectomy on 5/10/22 with final path showing Stage II  T4bN0 adenocarcinoma with negative margins    - CEA level today  - repeat CT C/A/P due now, ordered  - cscope 1yr postop  - awaiting evaluation by [facility] for possible adjuvant chemotx although she is now 4 months postop  - will be happy to place Mediport for chemotx administration if needed  - RTC 3 months for surveillance    Maury Liu MD  Colon and Rectal Surgery  [facility]

## 2022-10-04 ENCOUNTER — PATIENT MESSAGE (OUTPATIENT)
Dept: ADMINISTRATIVE | Facility: HOSPITAL | Age: 72
End: 2022-10-04
Payer: MEDICARE

## 2022-10-05 ENCOUNTER — HOSPITAL ENCOUNTER (OUTPATIENT)
Dept: RADIOLOGY | Facility: HOSPITAL | Age: 72
Discharge: HOME | End: 2022-10-05
Attending: COLON & RECTAL SURGERY
Payer: MEDICARE

## 2022-10-05 DIAGNOSIS — C18.9 MALIGNANT NEOPLASM OF COLON, UNSPECIFIED PART OF COLON: ICD-10-CM

## 2022-10-05 PROCEDURE — 25500020 PHARM REV CODE 255: Performed by: COLON & RECTAL SURGERY

## 2022-10-05 PROCEDURE — 74177 CT ABD & PELVIS W/CONTRAST: CPT | Mod: 26,,, | Performed by: RADIOLOGY

## 2022-10-05 PROCEDURE — 74177 CT ABD & PELVIS W/CONTRAST: CPT | Mod: TC

## 2022-10-05 PROCEDURE — 71260 CT THORAX DX C+: CPT | Mod: 26,,, | Performed by: RADIOLOGY

## 2022-10-05 PROCEDURE — 71260 CT THORAX DX C+: CPT | Mod: TC

## 2022-10-05 PROCEDURE — 74177 CT CHEST ABDOMEN PELVIS WITH CONTRAST (XPD): ICD-10-PCS | Mod: 26,,, | Performed by: RADIOLOGY

## 2022-10-05 PROCEDURE — 71260 CT CHEST ABDOMEN PELVIS WITH CONTRAST (XPD): ICD-10-PCS | Mod: 26,,, | Performed by: RADIOLOGY

## 2022-10-05 RX ADMIN — IOHEXOL 100 ML: 350 INJECTION, SOLUTION INTRAVENOUS at 11:10

## 2022-10-05 RX ADMIN — IOHEXOL 30 ML: 350 INJECTION, SOLUTION INTRAVENOUS at 10:10

## 2022-11-18 ENCOUNTER — TELEPHONE (OUTPATIENT)
Dept: OBSTETRICS AND GYNECOLOGY | Facility: CLINIC | Age: 72
End: 2022-11-18
Payer: MEDICARE

## 2022-11-18 NOTE — TELEPHONE ENCOUNTER
----- Message from Sarkis Shipman sent at 11/17/2022  2:51 PM CST -----  Contact: Patient  Type:  Sooner Apoointment Request    Caller is requesting a sooner appointment.  Caller declined first available appointment listed below.  Caller will not accept being placed on the waitlist and is requesting a message be sent to doctor.  Name of Caller:Ambar Duenas   When is the first available appointment? 1/19/23  Symptoms: pt states if you look at her chart you will see   Would the patient rather a call back or a response via [facility]?  Call back   Best Call Back Number:[phone]   Additional Information:

## 2022-12-16 ENCOUNTER — PATIENT OUTREACH (OUTPATIENT)
Dept: ADMINISTRATIVE | Facility: HOSPITAL | Age: 72
End: 2022-12-16
Payer: MEDICARE

## 2022-12-16 NOTE — PROGRESS NOTES
Working Mammogram Report:     Pt overdue for mammogram. Called to offer scheduling.  Declined scheduling at this time.

## 2023-01-18 ENCOUNTER — OFFICE VISIT (OUTPATIENT)
Dept: UROLOGY | Facility: CLINIC | Age: 73
End: 2023-01-18
Payer: MEDICARE

## 2023-01-18 VITALS
WEIGHT: 204.69 LBS | SYSTOLIC BLOOD PRESSURE: 160 MMHG | HEIGHT: 69 IN | BODY MASS INDEX: 30.32 KG/M2 | DIASTOLIC BLOOD PRESSURE: 92 MMHG | HEART RATE: 87 BPM

## 2023-01-18 DIAGNOSIS — R31.0 GROSS HEMATURIA: Primary | ICD-10-CM

## 2023-01-18 PROCEDURE — 3077F SYST BP >= 140 MM HG: CPT | Mod: CPTII,S$GLB,, | Performed by: UROLOGY

## 2023-01-18 PROCEDURE — 3080F DIAST BP >= 90 MM HG: CPT | Mod: CPTII,S$GLB,, | Performed by: UROLOGY

## 2023-01-18 PROCEDURE — 3008F PR BODY MASS INDEX (BMI) DOCUMENTED: ICD-10-PCS | Mod: CPTII,S$GLB,, | Performed by: UROLOGY

## 2023-01-18 PROCEDURE — 99203 OFFICE O/P NEW LOW 30 MIN: CPT | Mod: S$GLB,,, | Performed by: UROLOGY

## 2023-01-18 PROCEDURE — 1126F AMNT PAIN NOTED NONE PRSNT: CPT | Mod: CPTII,S$GLB,, | Performed by: UROLOGY

## 2023-01-18 PROCEDURE — 99203 PR OFFICE/OUTPT VISIT, NEW, LEVL III, 30-44 MIN: ICD-10-PCS | Mod: S$GLB,,, | Performed by: UROLOGY

## 2023-01-18 PROCEDURE — 3080F PR MOST RECENT DIASTOLIC BLOOD PRESSURE >= 90 MM HG: ICD-10-PCS | Mod: CPTII,S$GLB,, | Performed by: UROLOGY

## 2023-01-18 PROCEDURE — 3077F PR MOST RECENT SYSTOLIC BLOOD PRESSURE >= 140 MM HG: ICD-10-PCS | Mod: CPTII,S$GLB,, | Performed by: UROLOGY

## 2023-01-18 PROCEDURE — 1159F MED LIST DOCD IN RCRD: CPT | Mod: CPTII,S$GLB,, | Performed by: UROLOGY

## 2023-01-18 PROCEDURE — 1101F PT FALLS ASSESS-DOCD LE1/YR: CPT | Mod: CPTII,S$GLB,, | Performed by: UROLOGY

## 2023-01-18 PROCEDURE — 99999 PR PBB SHADOW E&M-EST. PATIENT-LVL III: ICD-10-PCS | Mod: PBBFAC,,, | Performed by: UROLOGY

## 2023-01-18 PROCEDURE — 1101F PR PT FALLS ASSESS DOC 0-1 FALLS W/OUT INJ PAST YR: ICD-10-PCS | Mod: CPTII,S$GLB,, | Performed by: UROLOGY

## 2023-01-18 PROCEDURE — 1126F PR PAIN SEVERITY QUANTIFIED, NO PAIN PRESENT: ICD-10-PCS | Mod: CPTII,S$GLB,, | Performed by: UROLOGY

## 2023-01-18 PROCEDURE — 3008F BODY MASS INDEX DOCD: CPT | Mod: CPTII,S$GLB,, | Performed by: UROLOGY

## 2023-01-18 PROCEDURE — 3288F PR FALLS RISK ASSESSMENT DOCUMENTED: ICD-10-PCS | Mod: CPTII,S$GLB,, | Performed by: UROLOGY

## 2023-01-18 PROCEDURE — 1159F PR MEDICATION LIST DOCUMENTED IN MEDICAL RECORD: ICD-10-PCS | Mod: CPTII,S$GLB,, | Performed by: UROLOGY

## 2023-01-18 PROCEDURE — 99999 PR PBB SHADOW E&M-EST. PATIENT-LVL III: CPT | Mod: PBBFAC,,, | Performed by: UROLOGY

## 2023-01-18 PROCEDURE — 3288F FALL RISK ASSESSMENT DOCD: CPT | Mod: CPTII,S$GLB,, | Performed by: UROLOGY

## 2023-01-18 NOTE — PROGRESS NOTES
Chief Complaint:   Encounter Diagnosis   Name Primary?    Gross hematuria Yes       HPI:   1/18/23- 72-year-old female who were consulted intraoperatively on last May due to some gross hematuria after hysteroscopy and abdominal surgery.  Cystoscopy that time demonstrated some mucosal changes, but no definitive perforation or other abnormalities.  Since then patient has had no other issues, no urological issues no family history of urological cancers or stones.  Patient was asymptomatic perioperatively as well.    Allergies:  Patient has no known allergies.    Medications:  has a current medication list which includes the following prescription(s): amlodipine, montelukast, oxycodone, and pantoprazole.    Review of Systems:  General: No fever, chills, fatigability, or weight loss.  Skin: No rashes, itching, or changes in color or texture of skin.  Chest: Denies BLACKWELL, cyanosis, wheezing, cough, and sputum production.  Abdomen: Appetite fine. No weight loss. Denies diarrhea, abdominal pain, hematemesis, or blood in stool.  Musculoskeletal: No joint stiffness or swelling. Denies back pain.  : As above.  All other review of systems negative.    PMH:   has a past medical history of Anemia, unspecified, Fibroids, GERD (gastroesophageal reflux disease), Hyperlipidemia, and Hypertension.    PSH:   has a past surgical history that includes Hemorrhoid surgery; Esophagogastroduodenoscopy (N/A, 3/28/2022); Colonoscopy (N/A, 3/28/2022); Subtotal colectomy (Left, 5/10/2022); Injection of anesthetic agent into tissue plane defined by transversus abdominis muscle (N/A, 5/10/2022); Partial gastrectomy (N/A, 5/10/2022); Omentectomy (5/10/2022); Liver biopsy (5/10/2022); and Hysteroscopy with dilation and curettage of uterus (N/A, 5/10/2022).    FamHx: family history is not on file.    SocHx:  reports that she has never smoked. She has never used smokeless tobacco. She reports current alcohol use of about 2.0 standard drinks per week.  She reports that she does not use drugs.      Physical Exam:  Vitals:    01/18/23 1450   BP: (!) 160/92   Pulse: 87     General: A&Ox3, no apparent distress, no deformities  Neck: No masses, normal ROM  Lungs: normal inspiration, no use of accessory muscles  Heart: normal pulse, no arrhythmias  Abdomen: Soft, NT, ND, no masses, no hernias, no hepatosplenomegaly  Skin: The skin is warm and dry. No jaundice.  Ext: No c/c/e.    Labs/Studies:   None    Impression/Plan:     Gross hematuria- while this happened immediately during the above-stated surgical procedures, cystoscopy demonstrated no abnormalities per se.  Patient was asymptomatic preoperatively and postoperatively with no previous urological history.  For any recurrence of hematuria or other urological issues, I have asked her to contact our office.  Otherwise can follow on an as-needed basis.

## 2023-01-26 ENCOUNTER — TELEPHONE (OUTPATIENT)
Dept: SURGERY | Facility: CLINIC | Age: 73
End: 2023-01-26
Payer: MEDICARE

## 2023-01-26 NOTE — TELEPHONE ENCOUNTER
Returned call to patient and scheduled follow up as requested. Patient is aware of date, time and location.

## 2023-02-02 ENCOUNTER — TELEPHONE (OUTPATIENT)
Dept: OBSTETRICS AND GYNECOLOGY | Facility: CLINIC | Age: 73
End: 2023-02-02
Payer: MEDICARE

## 2023-02-02 NOTE — TELEPHONE ENCOUNTER
----- Message from Olena Alegria sent at 2/2/2023 11:34 AM CST -----  Contact: Ambar  Patient is calling to speak with the nurse in regards to appt. Reports needing to reschedule appt sooner than next appt. Please give patient a callback at [phone].

## 2023-02-09 ENCOUNTER — OFFICE VISIT (OUTPATIENT)
Dept: OBSTETRICS AND GYNECOLOGY | Facility: CLINIC | Age: 73
End: 2023-02-09
Payer: MEDICARE

## 2023-02-09 VITALS
BODY MASS INDEX: 30.66 KG/M2 | DIASTOLIC BLOOD PRESSURE: 90 MMHG | WEIGHT: 207 LBS | SYSTOLIC BLOOD PRESSURE: 150 MMHG | HEIGHT: 69 IN

## 2023-02-09 DIAGNOSIS — Z87.42 HISTORY OF ENDOMETRIAL HYPERPLASIA: ICD-10-CM

## 2023-02-09 DIAGNOSIS — M81.0 AGE-RELATED OSTEOPOROSIS WITHOUT CURRENT PATHOLOGICAL FRACTURE: ICD-10-CM

## 2023-02-09 DIAGNOSIS — Z13.820 SCREENING FOR OSTEOPOROSIS: ICD-10-CM

## 2023-02-09 DIAGNOSIS — Z12.31 SCREENING MAMMOGRAM, ENCOUNTER FOR: Primary | ICD-10-CM

## 2023-02-09 PROCEDURE — 1126F PR PAIN SEVERITY QUANTIFIED, NO PAIN PRESENT: ICD-10-PCS | Mod: CPTII,S$GLB,, | Performed by: OBSTETRICS & GYNECOLOGY

## 2023-02-09 PROCEDURE — 1159F MED LIST DOCD IN RCRD: CPT | Mod: CPTII,S$GLB,, | Performed by: OBSTETRICS & GYNECOLOGY

## 2023-02-09 PROCEDURE — 3077F PR MOST RECENT SYSTOLIC BLOOD PRESSURE >= 140 MM HG: ICD-10-PCS | Mod: CPTII,S$GLB,, | Performed by: OBSTETRICS & GYNECOLOGY

## 2023-02-09 PROCEDURE — 3080F DIAST BP >= 90 MM HG: CPT | Mod: CPTII,S$GLB,, | Performed by: OBSTETRICS & GYNECOLOGY

## 2023-02-09 PROCEDURE — 3077F SYST BP >= 140 MM HG: CPT | Mod: CPTII,S$GLB,, | Performed by: OBSTETRICS & GYNECOLOGY

## 2023-02-09 PROCEDURE — 99213 PR OFFICE/OUTPT VISIT, EST, LEVL III, 20-29 MIN: ICD-10-PCS | Mod: S$GLB,,, | Performed by: OBSTETRICS & GYNECOLOGY

## 2023-02-09 PROCEDURE — 3008F PR BODY MASS INDEX (BMI) DOCUMENTED: ICD-10-PCS | Mod: CPTII,S$GLB,, | Performed by: OBSTETRICS & GYNECOLOGY

## 2023-02-09 PROCEDURE — 1101F PR PT FALLS ASSESS DOC 0-1 FALLS W/OUT INJ PAST YR: ICD-10-PCS | Mod: CPTII,S$GLB,, | Performed by: OBSTETRICS & GYNECOLOGY

## 2023-02-09 PROCEDURE — 99213 OFFICE O/P EST LOW 20 MIN: CPT | Mod: S$GLB,,, | Performed by: OBSTETRICS & GYNECOLOGY

## 2023-02-09 PROCEDURE — 99999 PR PBB SHADOW E&M-EST. PATIENT-LVL III: ICD-10-PCS | Mod: PBBFAC,,, | Performed by: OBSTETRICS & GYNECOLOGY

## 2023-02-09 PROCEDURE — 3288F PR FALLS RISK ASSESSMENT DOCUMENTED: ICD-10-PCS | Mod: CPTII,S$GLB,, | Performed by: OBSTETRICS & GYNECOLOGY

## 2023-02-09 PROCEDURE — 3288F FALL RISK ASSESSMENT DOCD: CPT | Mod: CPTII,S$GLB,, | Performed by: OBSTETRICS & GYNECOLOGY

## 2023-02-09 PROCEDURE — 1126F AMNT PAIN NOTED NONE PRSNT: CPT | Mod: CPTII,S$GLB,, | Performed by: OBSTETRICS & GYNECOLOGY

## 2023-02-09 PROCEDURE — 3080F PR MOST RECENT DIASTOLIC BLOOD PRESSURE >= 90 MM HG: ICD-10-PCS | Mod: CPTII,S$GLB,, | Performed by: OBSTETRICS & GYNECOLOGY

## 2023-02-09 PROCEDURE — 1101F PT FALLS ASSESS-DOCD LE1/YR: CPT | Mod: CPTII,S$GLB,, | Performed by: OBSTETRICS & GYNECOLOGY

## 2023-02-09 PROCEDURE — 3008F BODY MASS INDEX DOCD: CPT | Mod: CPTII,S$GLB,, | Performed by: OBSTETRICS & GYNECOLOGY

## 2023-02-09 PROCEDURE — 99999 PR PBB SHADOW E&M-EST. PATIENT-LVL III: CPT | Mod: PBBFAC,,, | Performed by: OBSTETRICS & GYNECOLOGY

## 2023-02-09 PROCEDURE — 1159F PR MEDICATION LIST DOCUMENTED IN MEDICAL RECORD: ICD-10-PCS | Mod: CPTII,S$GLB,, | Performed by: OBSTETRICS & GYNECOLOGY

## 2023-02-09 NOTE — PROGRESS NOTES
Subjective:       Patient ID: Ambar Duenas is a 72 y.o. female.    Chief Complaint:  Consult      History of Present Illness  HPI  Here for follow up  Reports scant vaginal discharge--but does not feel she had vaginal bleeding    Ct scan --no info on pelvis    GYN & OB History  Patient's last menstrual period was 2019.   Date of Last Pap: No result found    OB History    Para Term  AB Living   1         1   SAB IAB Ectopic Multiple Live Births                  # Outcome Date GA Lbr Jordan/2nd Weight Sex Delivery Anes PTL Lv   1                 Review of Systems  Review of Systems   All other systems reviewed and are negative.        Objective:      Physical Exam:   Constitutional: She appears well-developed.     Eyes: Pupils are equal, round, and reactive to light. Conjunctivae and EOM are normal.      Pulmonary/Chest: Effort normal.        Abdominal: Soft.             Musculoskeletal: Normal range of motion.       Neurological: She is alert.    Skin: Skin is warm.    Psychiatric: She has a normal mood and affect.         Assessment:        1. Screening mammogram, encounter for    2. History of endometrial hyperplasia               Plan:      Pelvic sono ordered  Bmd/mammo ordered  Return for ww exam after 3/2022

## 2023-02-13 ENCOUNTER — LAB VISIT (OUTPATIENT)
Dept: LAB | Facility: HOSPITAL | Age: 73
End: 2023-02-13
Attending: COLON & RECTAL SURGERY
Payer: MEDICARE

## 2023-02-13 ENCOUNTER — OFFICE VISIT (OUTPATIENT)
Dept: SURGERY | Facility: CLINIC | Age: 73
End: 2023-02-13
Payer: MEDICARE

## 2023-02-13 VITALS — WEIGHT: 187.19 LBS | BODY MASS INDEX: 27.64 KG/M2

## 2023-02-13 DIAGNOSIS — Z85.038 PERSONAL HISTORY OF COLON CANCER: Primary | ICD-10-CM

## 2023-02-13 DIAGNOSIS — Z85.038 PERSONAL HISTORY OF COLON CANCER: ICD-10-CM

## 2023-02-13 DIAGNOSIS — C18.9 MALIGNANT NEOPLASM OF COLON, UNSPECIFIED PART OF COLON: ICD-10-CM

## 2023-02-13 LAB — CEA SERPL-MCNC: 2.2 NG/ML (ref 0–5)

## 2023-02-13 PROCEDURE — 3008F PR BODY MASS INDEX (BMI) DOCUMENTED: ICD-10-PCS | Mod: CPTII,S$GLB,, | Performed by: COLON & RECTAL SURGERY

## 2023-02-13 PROCEDURE — 36415 COLL VENOUS BLD VENIPUNCTURE: CPT | Performed by: COLON & RECTAL SURGERY

## 2023-02-13 PROCEDURE — 99214 PR OFFICE/OUTPT VISIT, EST, LEVL IV, 30-39 MIN: ICD-10-PCS | Mod: S$GLB,,, | Performed by: COLON & RECTAL SURGERY

## 2023-02-13 PROCEDURE — 99999 PR PBB SHADOW E&M-EST. PATIENT-LVL II: CPT | Mod: PBBFAC,,, | Performed by: COLON & RECTAL SURGERY

## 2023-02-13 PROCEDURE — 99999 PR PBB SHADOW E&M-EST. PATIENT-LVL II: ICD-10-PCS | Mod: PBBFAC,,, | Performed by: COLON & RECTAL SURGERY

## 2023-02-13 PROCEDURE — 1126F PR PAIN SEVERITY QUANTIFIED, NO PAIN PRESENT: ICD-10-PCS | Mod: CPTII,S$GLB,, | Performed by: COLON & RECTAL SURGERY

## 2023-02-13 PROCEDURE — 3008F BODY MASS INDEX DOCD: CPT | Mod: CPTII,S$GLB,, | Performed by: COLON & RECTAL SURGERY

## 2023-02-13 PROCEDURE — 82378 CARCINOEMBRYONIC ANTIGEN: CPT | Performed by: COLON & RECTAL SURGERY

## 2023-02-13 PROCEDURE — 1159F MED LIST DOCD IN RCRD: CPT | Mod: CPTII,S$GLB,, | Performed by: COLON & RECTAL SURGERY

## 2023-02-13 PROCEDURE — 1126F AMNT PAIN NOTED NONE PRSNT: CPT | Mod: CPTII,S$GLB,, | Performed by: COLON & RECTAL SURGERY

## 2023-02-13 PROCEDURE — 99214 OFFICE O/P EST MOD 30 MIN: CPT | Mod: S$GLB,,, | Performed by: COLON & RECTAL SURGERY

## 2023-02-13 PROCEDURE — 1159F PR MEDICATION LIST DOCUMENTED IN MEDICAL RECORD: ICD-10-PCS | Mod: CPTII,S$GLB,, | Performed by: COLON & RECTAL SURGERY

## 2023-02-13 NOTE — PROGRESS NOTES
History & Physical    SUBJECTIVE:     Chief Complaint   Patient presents with    Follow-up     Colon CA     Ref: Melanie Ballard MD    History of Present Illness:  Patient is a 72 y.o. female presents forEvaluation of colonic adenocarcinoma.  Patient was undergoing surveillance colonoscopy in 2022 where she was found to have a almost completely obstructing colon mass that was biopsied and resulted as adenocarcinoma.  This was initially thought to be at the sigmoid colon upon imaging with CT scan of the chest abdomen pelvis, no metastatic disease was found but she was found to have a large fungating mass near the splenic flexure with a normal appearing sigmoid.  She has been asymptomatic but before the colonoscopy in since that time.  She denies any fever, chills, nausea, vomiting, hematochezia, melena or abdominal pain.  She only reports a minor amount of reflux that has since resolved with the addition of pantoprazole.  Her only significant past surgical history is  section via lower midline incision.  She denies any family history of colorectal cancer or IBD.  She has had previous colonic polyps on a colonoscopy around 7 years ago.  She reports she is still having normal bowel movements.    5/10/2022: open subtotal colectomy, partial gastrectomy (final path: Stage II T4N0 adenocarcinoma    Interval history:  Since last clinic visit, the patient continues to do well.  She is tolerating regular diet without nausea or vomiting.  She denies any hematochezia or melena.  Never saw Medical Oncology.    Review of patient's allergies indicates:  No Known Allergies    Current Outpatient Medications   Medication Sig Dispense Refill    amLODIPine (NORVASC) 5 MG tablet TAKE 1 TABLET(5 MG) BY MOUTH EVERY DAY 90 tablet 3    montelukast (SINGULAIR) 10 mg tablet Take 1 tablet (10 mg total) by mouth every evening. 30 tablet 3    pantoprazole (PROTONIX) 40 MG tablet TAKE 1 TABLET(40 MG) BY MOUTH TWICE DAILY 180  tablet 0     No current facility-administered medications for this visit.       Past Medical History:   Diagnosis Date    Anemia, unspecified     Fibroids     GERD (gastroesophageal reflux disease)     Hyperlipidemia     Hypertension      Past Surgical History:   Procedure Laterality Date    COLONOSCOPY N/A 3/28/2022    Procedure: COLONOSCOPY;  Surgeon: Melanie Ballard MD;  Location: [facility];  Service: Endoscopy;  Laterality: N/A;    ESOPHAGOGASTRODUODENOSCOPY N/A 3/28/2022    Procedure: ESOPHAGOGASTRODUODENOSCOPY (EGD);  Surgeon: Melanie Ballard MD;  Location: [facility];  Service: Endoscopy;  Laterality: N/A;    HEMORRHOID SURGERY      HYSTEROSCOPY WITH DILATION AND CURETTAGE OF UTERUS N/A 5/10/2022    Procedure: HYSTEROSCOPY, WITH DILATION AND CURETTAGE OF UTERUS;  Surgeon: Madelyn West MD;  Location: [facility];  Service: OB/GYN;  Laterality: N/A;    INJECTION OF ANESTHETIC AGENT INTO TISSUE PLANE DEFINED BY TRANSVERSUS ABDOMINIS MUSCLE N/A 5/10/2022    Procedure: BLOCK, TRANSVERSUS ABDOMINIS PLANE;  Surgeon: Maury Liu MD;  Location: [facility];  Service: General;  Laterality: N/A;    LIVER BIOPSY  5/10/2022    Procedure: BIOPSY, LIVER;  Surgeon: Maury Liu MD;  Location: [facility];  Service: General;;    OMENTECTOMY  5/10/2022    Procedure: OMENTECTOMY;  Surgeon: Maury Liu MD;  Location: [facility];  Service: General;;  partial    PARTIAL GASTRECTOMY N/A 5/10/2022    Procedure: GASTRECTOMY, PARTIAL;  Surgeon: Maury Liu MD;  Location: [facility];  Service: General;  Laterality: N/A;    SUBTOTAL COLECTOMY Left 5/10/2022    Procedure: COLECTOMY, PARTIAL;  Surgeon: Maury Liu MD;  Location: [facility];  Service: General;  Laterality: Left;  LEFT VS SUBTOTAl vs EXTENDED RIGHT     No family history on file.  Social History     Tobacco Use    Smoking status: Never    Smokeless tobacco: Never   Substance Use Topics    Alcohol use: Yes     Alcohol/week: 2.0 standard drinks      Types: 2 Glasses of wine per week     Comment: occ    Drug use: Never        Review of Systems:  Review of Systems   Constitutional:  Negative for activity change, appetite change, chills, fatigue, fever and unexpected weight change.   HENT:  Negative for congestion, ear pain, sore throat and trouble swallowing.    Eyes:  Negative for pain, redness and itching.   Respiratory:  Negative for cough, shortness of breath and wheezing.    Cardiovascular:  Negative for chest pain, palpitations and leg swelling.   Gastrointestinal:  Negative for abdominal distention, abdominal pain, anal bleeding, blood in stool, constipation, diarrhea, nausea, rectal pain and vomiting.   Endocrine: Negative for cold intolerance, heat intolerance and polyuria.   Genitourinary:  Negative for dysuria, flank pain, frequency and hematuria.   Musculoskeletal:  Negative for gait problem, joint swelling and neck pain.   Skin:  Negative for color change, rash and wound.   Allergic/Immunologic: Negative for environmental allergies and immunocompromised state.   Neurological:  Negative for dizziness, speech difficulty, weakness and numbness.   Psychiatric/Behavioral:  Negative for agitation, confusion and hallucinations.      OBJECTIVE:     Vital Signs (Most Recent)        84.9 kg (187 lb 2.7 oz)     Physical Exam:  Physical Exam  Constitutional:       Appearance: She is well-developed.   HENT:      Head: Normocephalic and atraumatic.   Eyes:      Conjunctiva/sclera: Conjunctivae normal.   Neck:      Thyroid: No thyromegaly.   Cardiovascular:      Rate and Rhythm: Normal rate and regular rhythm.   Pulmonary:      Effort: Pulmonary effort is normal. No respiratory distress.   Abdominal:      Comments: Soft, nondistended, nontender; well-healed midline incision   Musculoskeletal:         General: No tenderness. Normal range of motion.      Cervical back: Normal range of motion.   Skin:     General: Skin is warm and dry.      Capillary Refill: Capillary  refill takes less than 2 seconds.      Findings: No rash.   Neurological:      Mental Status: She is alert and oriented to person, place, and time.       Laboratory  Lab Results   Component Value Date    WBC 3.87 (L) 09/12/2022    HGB 10.1 (L) 09/12/2022    HCT 33.8 (L) 09/12/2022     09/12/2022    CHOL 211 (H) 03/08/2022    TRIG 65 03/08/2022    HDL 54 03/08/2022    ALT 10 09/12/2022    AST 17 09/12/2022     09/12/2022    K 3.6 09/12/2022     09/12/2022    CREATININE 0.7 09/12/2022    CREATININE 0.7 09/12/2022    BUN 15 09/12/2022    CO2 26 09/12/2022    TSH 1.710 04/20/2022       Component Ref Range & Units 5 mo ago 9 mo ago   CEA 0.0 - 5.0 ng/mL 2.1  3.2 CM        Diagnostic Results:  CT: Reviewed  Colonoscopy: reviewed    CT Oct 2022:  FINDINGS:  Chest:     Heart and great vessels: Within normal limits.     Adenopathy: No pathologically enlarged axillary, mediastinal or hilar lymph nodes.     Lungs: Clear bilaterally.     Abdomen:     Liver: Within normal limits.     Gallbladder and biliary: Within normal limits.     Spleen: Within normal limits.     Pancreas: No concerning abnormality     Adrenals: Within normal limits.     Kidneys: Punctate bilateral nonobstructive calculi.     Stomach/Bowel: Stomach demonstrates small hiatal hernia.  Postsurgical findings of partial gastrectomy with circumferential wall thickening of the antrum likely related to nondistention.  Small bowel is nonobstructive.  Partial colectomy findings noted with left upper quadrant primary anastomosis.  Remaining colon demonstrates no focal abnormality.     Peritoneum: No ascites or pneumoperitoneum.     Abdominal Adenopathy: None.     Vasculature: Within normal limits.  Disease.  Other findings as above.     Pelvis:     Urinary bladder: Unremarkable.     Pelvis adenopathy: None.     Bones: No suspicious osseous findings for malignancy with degenerative changes noted.     Miscellaneous: None.     Impression:     Interval  postsurgical findings including partial colectomy and partial gastrectomy.  No definite CT evidence of local recurrence.  No CT evidence of distant metastatic disease.        PATHOLOGY:  1. Stomach,  small bowel (2.5 cm) right,  transverse and descending colon   (54 cm), partial colectomy:   - Adenocarcinoma (7.4 cm)   - Appendix with fibrous obliteration of the appendiceal tip, negative for   malignancy   - Fifty lymph nodes, negative for metastatic carcinoma (0/50)   - See synoptic report for details and complete pathologic staging   2. Liver, biopsy:   - Benign liver probe parenchyma with subcapsular fibrotic nodule   -  Negative  for metastatic carcinoma   3. Colon and small bowel, anastomosis:   - Small bowel and colonic mucosa with patchy ischemic features   -  Negative  for dysplasia or malignancy   4. Omentum, omentectomy:   - Benign fibrofatty tissue   -  Negative  for metastatic carcinoma     SURGICAL PATHOLOGY CASE SUMMARY/OPTIC REPORT -COLON AND RECTUM   Procedure:   Right, transverse and left colon   Macroscopic evaluation of mesorectum:   Not identified   Tumor site:   Transverse and descending colon   Histologic type:   Adenocarcinoma with mucinous features   Histologic grade:   G2, moderately differentiated   Tumor size:   7.4 cm in greatest dimension   Multiple primary sites:   Not applicable   Tumor extent:   Tumor directly invades the muscularis of the stomach   Macroscopic tumor perforation:   Not identified   Lymphovascular invasion:   Not identified   Perineural invasion:   Not identified   Treatment effect:   No known presurgical therapy   Margin status for invasive carcinoma:   All margins  negative  for invasive   carcinoma   Closest margin to invasive carcinoma:  Radial -3 mm to nearest invasive   tumor   Regional lymph node status:   Regional lymph nodes present and all  negative   for tumor   Number lymph nodes examined:  50   Tumor deposits:   Not identified   Distant metastasis:   Not  identified   Ancillary studies:   Performed on previous biopsy BRS- and show intact   MMR by immunohistochemistry (see separate report for details)   PATHOLOGIC STAGE CLASSIFICATION (pTNM, AJCC 8th Edition): pT4b, pN0    ASSESSMENT/PLAN:     73yo F with colonic adenocarcinoma now s/p subtotal colectomy with partial gastrectomy on 5/10/22 with final path showing Stage II T4bN0 adenocarcinoma with negative margins    - CEA level today and q3 months  - CT C/A/P ordered for April '23  - cscope 1yr postop, due in May 2023  - RTC 3 months for surveillance    Maury Liu MD  Colon and Rectal Surgery  [facility]

## 2023-04-11 ENCOUNTER — LAB VISIT (OUTPATIENT)
Dept: LAB | Facility: HOSPITAL | Age: 73
End: 2023-04-11
Attending: COLON & RECTAL SURGERY
Payer: MEDICARE

## 2023-04-11 DIAGNOSIS — Z85.038 PERSONAL HISTORY OF COLON CANCER: ICD-10-CM

## 2023-04-11 PROCEDURE — 36415 COLL VENOUS BLD VENIPUNCTURE: CPT | Performed by: COLON & RECTAL SURGERY

## 2023-04-11 PROCEDURE — 82565 ASSAY OF CREATININE: CPT | Performed by: COLON & RECTAL SURGERY

## 2023-04-12 ENCOUNTER — HOSPITAL ENCOUNTER (OUTPATIENT)
Dept: RADIOLOGY | Facility: HOSPITAL | Age: 73
Discharge: HOME | End: 2023-04-12
Attending: COLON & RECTAL SURGERY
Payer: MEDICARE

## 2023-04-12 DIAGNOSIS — Z85.038 PERSONAL HISTORY OF COLON CANCER: ICD-10-CM

## 2023-04-12 LAB
CREAT SERPL-MCNC: 0.8 MG/DL (ref 0.5–1.4)
EST. GFR  (NO RACE VARIABLE): >60 ML/MIN/1.73 M^2

## 2023-04-12 PROCEDURE — 25500020 PHARM REV CODE 255: Performed by: COLON & RECTAL SURGERY

## 2023-04-12 PROCEDURE — 74177 CT ABD & PELVIS W/CONTRAST: CPT | Mod: 26,,, | Performed by: RADIOLOGY

## 2023-04-12 PROCEDURE — 71260 CT CHEST ABDOMEN PELVIS WITH CONTRAST (XPD): ICD-10-PCS | Mod: 26,,, | Performed by: RADIOLOGY

## 2023-04-12 PROCEDURE — 74177 CT CHEST ABDOMEN PELVIS WITH CONTRAST (XPD): ICD-10-PCS | Mod: 26,,, | Performed by: RADIOLOGY

## 2023-04-12 PROCEDURE — 71260 CT THORAX DX C+: CPT | Mod: TC

## 2023-04-12 PROCEDURE — 71260 CT THORAX DX C+: CPT | Mod: 26,,, | Performed by: RADIOLOGY

## 2023-04-12 PROCEDURE — 74177 CT ABD & PELVIS W/CONTRAST: CPT | Mod: TC

## 2023-04-12 PROCEDURE — A9698 NON-RAD CONTRAST MATERIALNOC: HCPCS | Performed by: COLON & RECTAL SURGERY

## 2023-04-12 RX ADMIN — IOHEXOL 100 ML: 350 INJECTION, SOLUTION INTRAVENOUS at 12:04

## 2023-04-12 RX ADMIN — IOHEXOL 1000 ML: 9 SOLUTION ORAL at 11:04

## 2023-04-13 ENCOUNTER — TELEPHONE (OUTPATIENT)
Dept: SURGERY | Facility: CLINIC | Age: 73
End: 2023-04-13
Payer: MEDICARE

## 2023-04-13 NOTE — TELEPHONE ENCOUNTER
Spoke with patient regarding rescheduling appointment from 5/15 to 5/22. Patient verbalizes understanding.     ----- Message from Joleen Fiore sent at 4/13/2023  2:14 PM CDT -----  Contact: Ambar Yeboah called in to guero viveros her 5/15 appt to the following week. Please call her back at [phone].     Thanks  TS

## 2023-05-04 ENCOUNTER — TELEPHONE (OUTPATIENT)
Dept: OBSTETRICS AND GYNECOLOGY | Facility: CLINIC | Age: 73
End: 2023-05-04
Payer: MEDICARE

## 2023-05-04 NOTE — TELEPHONE ENCOUNTER
----- Message from Anabela Alegria sent at 5/4/2023  3:08 PM CDT -----  Contact: self [phone]  Patient called in this afternoon stating she had a ct done and it showed what she is dealing with. She wants to know does she still need the ultrasound or can you just read the ct. Please call back [phone] thanks ed

## 2023-05-04 NOTE — TELEPHONE ENCOUNTER
Called patient and she is scheduled for pelvic u/s on 5/11.  Patient had CT done on 4/14.  Patient is wanting to know if she still needs u/s?  Advised patient message would be sent to provider.

## 2023-05-11 ENCOUNTER — HOSPITAL ENCOUNTER (OUTPATIENT)
Dept: RADIOLOGY | Facility: HOSPITAL | Age: 73
Discharge: HOME | End: 2023-05-11
Attending: OBSTETRICS & GYNECOLOGY
Payer: MEDICARE

## 2023-05-11 DIAGNOSIS — Z87.42 HISTORY OF ENDOMETRIAL HYPERPLASIA: ICD-10-CM

## 2023-05-11 PROCEDURE — 76856 US EXAM PELVIC COMPLETE: CPT | Mod: 26,,, | Performed by: RADIOLOGY

## 2023-05-11 PROCEDURE — 76856 US PELVIS COMPLETE NON OB: ICD-10-PCS | Mod: 26,,, | Performed by: RADIOLOGY

## 2023-05-11 PROCEDURE — 76856 US EXAM PELVIC COMPLETE: CPT | Mod: TC

## 2023-05-12 ENCOUNTER — OFFICE VISIT (OUTPATIENT)
Dept: INTERNAL MEDICINE | Facility: CLINIC | Age: 73
End: 2023-05-12
Payer: MEDICARE

## 2023-05-12 VITALS
WEIGHT: 210.75 LBS | DIASTOLIC BLOOD PRESSURE: 84 MMHG | HEIGHT: 69 IN | OXYGEN SATURATION: 97 % | HEART RATE: 90 BPM | RESPIRATION RATE: 18 BRPM | SYSTOLIC BLOOD PRESSURE: 132 MMHG | BODY MASS INDEX: 31.21 KG/M2

## 2023-05-12 DIAGNOSIS — R79.9 ABNORMAL FINDING OF BLOOD CHEMISTRY, UNSPECIFIED: ICD-10-CM

## 2023-05-12 DIAGNOSIS — Z12.39 ENCOUNTER FOR SCREENING FOR MALIGNANT NEOPLASM OF BREAST, UNSPECIFIED SCREENING MODALITY: ICD-10-CM

## 2023-05-12 DIAGNOSIS — J30.9 ALLERGIC RHINITIS, UNSPECIFIED SEASONALITY, UNSPECIFIED TRIGGER: ICD-10-CM

## 2023-05-12 DIAGNOSIS — K21.9 GASTROESOPHAGEAL REFLUX DISEASE, UNSPECIFIED WHETHER ESOPHAGITIS PRESENT: ICD-10-CM

## 2023-05-12 DIAGNOSIS — Z00.00 ROUTINE PHYSICAL EXAMINATION: Primary | ICD-10-CM

## 2023-05-12 DIAGNOSIS — I10 ESSENTIAL HYPERTENSION: ICD-10-CM

## 2023-05-12 DIAGNOSIS — Z13.1 ENCOUNTER FOR SCREENING FOR DIABETES MELLITUS: ICD-10-CM

## 2023-05-12 DIAGNOSIS — K55.9 VASCULAR DISORDER OF INTESTINE, UNSPECIFIED: ICD-10-CM

## 2023-05-12 PROCEDURE — 3075F SYST BP GE 130 - 139MM HG: CPT | Mod: CPTII,S$GLB,, | Performed by: FAMILY MEDICINE

## 2023-05-12 PROCEDURE — 1159F MED LIST DOCD IN RCRD: CPT | Mod: CPTII,S$GLB,, | Performed by: FAMILY MEDICINE

## 2023-05-12 PROCEDURE — 1126F PR PAIN SEVERITY QUANTIFIED, NO PAIN PRESENT: ICD-10-PCS | Mod: CPTII,S$GLB,, | Performed by: FAMILY MEDICINE

## 2023-05-12 PROCEDURE — 3075F PR MOST RECENT SYSTOLIC BLOOD PRESS GE 130-139MM HG: ICD-10-PCS | Mod: CPTII,S$GLB,, | Performed by: FAMILY MEDICINE

## 2023-05-12 PROCEDURE — 3008F PR BODY MASS INDEX (BMI) DOCUMENTED: ICD-10-PCS | Mod: CPTII,S$GLB,, | Performed by: FAMILY MEDICINE

## 2023-05-12 PROCEDURE — 3288F FALL RISK ASSESSMENT DOCD: CPT | Mod: CPTII,S$GLB,, | Performed by: FAMILY MEDICINE

## 2023-05-12 PROCEDURE — 99397 PR PREVENTIVE VISIT,EST,65 & OVER: ICD-10-PCS | Mod: S$GLB,,, | Performed by: FAMILY MEDICINE

## 2023-05-12 PROCEDURE — 3288F PR FALLS RISK ASSESSMENT DOCUMENTED: ICD-10-PCS | Mod: CPTII,S$GLB,, | Performed by: FAMILY MEDICINE

## 2023-05-12 PROCEDURE — 1159F PR MEDICATION LIST DOCUMENTED IN MEDICAL RECORD: ICD-10-PCS | Mod: CPTII,S$GLB,, | Performed by: FAMILY MEDICINE

## 2023-05-12 PROCEDURE — 3079F PR MOST RECENT DIASTOLIC BLOOD PRESSURE 80-89 MM HG: ICD-10-PCS | Mod: CPTII,S$GLB,, | Performed by: FAMILY MEDICINE

## 2023-05-12 PROCEDURE — 99999 PR PBB SHADOW E&M-EST. PATIENT-LVL III: ICD-10-PCS | Mod: PBBFAC,,, | Performed by: FAMILY MEDICINE

## 2023-05-12 PROCEDURE — 99397 PER PM REEVAL EST PAT 65+ YR: CPT | Mod: S$GLB,,, | Performed by: FAMILY MEDICINE

## 2023-05-12 PROCEDURE — 99999 PR PBB SHADOW E&M-EST. PATIENT-LVL III: CPT | Mod: PBBFAC,,, | Performed by: FAMILY MEDICINE

## 2023-05-12 PROCEDURE — 1101F PT FALLS ASSESS-DOCD LE1/YR: CPT | Mod: CPTII,S$GLB,, | Performed by: FAMILY MEDICINE

## 2023-05-12 PROCEDURE — 3008F BODY MASS INDEX DOCD: CPT | Mod: CPTII,S$GLB,, | Performed by: FAMILY MEDICINE

## 2023-05-12 PROCEDURE — 1126F AMNT PAIN NOTED NONE PRSNT: CPT | Mod: CPTII,S$GLB,, | Performed by: FAMILY MEDICINE

## 2023-05-12 PROCEDURE — 1101F PR PT FALLS ASSESS DOC 0-1 FALLS W/OUT INJ PAST YR: ICD-10-PCS | Mod: CPTII,S$GLB,, | Performed by: FAMILY MEDICINE

## 2023-05-12 PROCEDURE — 3079F DIAST BP 80-89 MM HG: CPT | Mod: CPTII,S$GLB,, | Performed by: FAMILY MEDICINE

## 2023-05-12 RX ORDER — MONTELUKAST SODIUM 10 MG/1
10 TABLET ORAL NIGHTLY
Qty: 90 TABLET | Refills: 1 | Status: SHIPPED | OUTPATIENT
Start: 2023-05-12

## 2023-05-12 RX ORDER — CYCLOBENZAPRINE HCL 5 MG
5 TABLET ORAL NIGHTLY PRN
Qty: 30 TABLET | Refills: 3 | Status: SHIPPED | OUTPATIENT
Start: 2023-05-12 | End: 2023-05-17 | Stop reason: SDUPTHER

## 2023-05-12 RX ORDER — AMLODIPINE BESYLATE 5 MG/1
5 TABLET ORAL DAILY
Qty: 90 TABLET | Refills: 1 | Status: SHIPPED | OUTPATIENT
Start: 2023-05-12

## 2023-05-12 RX ORDER — PANTOPRAZOLE SODIUM 40 MG/1
40 TABLET, DELAYED RELEASE ORAL 2 TIMES DAILY
Qty: 180 TABLET | Refills: 1 | Status: SHIPPED | OUTPATIENT
Start: 2023-05-12

## 2023-05-12 NOTE — PROGRESS NOTES
Ambar Duenas  05/13/2023  [id number]    Gin Vitale MD  Patient Care Team:  Gin Vitale MD as PCP - General (Internal Medicine)  Farooq Limon MD (Inactive) as Consulting Physician (Hematology and Oncology)    Has the patient seen any provider outside of the network since the last visit ? (no). If yes, HIPPA forms completed and records requested.      Visit Type:a scheduled routine follow-up visit    Chief Complaint:  Chief Complaint   Patient presents with    Annual Exam       History of Present Illness:  HPI        ROS      Screening Questionnaires:    In the last two weeks how often have you felt down, depressed, or hopeless ( no )    In the last two weeks how often have you had little interest or pleasure in doing  (no )    In the last two weeks how often have you been bothered by the following problems:  1. Feeling nervous, anxious, or on edge ( no )    2. Not being able to stop or control worrying ( no)    3. Worrying too much about different things ( no)    4. Trouble relaxing ( no )    5. Being so restless that it is hard to sit still  (no )    6. Becoming easily annoyed or irritable (no)    7. Feeling afraid as if something awful might happen (no )    How often do you have a drink containing Alcohol? denied     Do you exercise  (no ) moderately active    Do you take a baby Aspirin daily ( no)    Do you have an advance directive ( no ) The patient was given information regarding Living Will/Durable Power-of- if requested.     The following were reviewed: Active problem list, medication list, allergies, family history, social history, and Health Maintenance.     History:  Past Medical History:   Diagnosis Date    Anemia, unspecified     Fibroids     GERD (gastroesophageal reflux disease)     Hyperlipidemia     Hypertension      Past Surgical History:   Procedure Laterality Date    COLONOSCOPY N/A 3/28/2022    Procedure: COLONOSCOPY;  Surgeon: Melanie Ballard MD;  Location: [facility]  ENDO;  Service: Endoscopy;  Laterality: N/A;    ESOPHAGOGASTRODUODENOSCOPY N/A 3/28/2022    Procedure: ESOPHAGOGASTRODUODENOSCOPY (EGD);  Surgeon: Melanie Ballard MD;  Location: [facility];  Service: Endoscopy;  Laterality: N/A;    HEMORRHOID SURGERY      HYSTEROSCOPY WITH DILATION AND CURETTAGE OF UTERUS N/A 5/10/2022    Procedure: HYSTEROSCOPY, WITH DILATION AND CURETTAGE OF UTERUS;  Surgeon: Madelyn West MD;  Location: [facility] OR;  Service: OB/GYN;  Laterality: N/A;    INJECTION OF ANESTHETIC AGENT INTO TISSUE PLANE DEFINED BY TRANSVERSUS ABDOMINIS MUSCLE N/A 5/10/2022    Procedure: BLOCK, TRANSVERSUS ABDOMINIS PLANE;  Surgeon: Maury Liu MD;  Location: [facility];  Service: General;  Laterality: N/A;    LIVER BIOPSY  5/10/2022    Procedure: BIOPSY, LIVER;  Surgeon: Maury Liu MD;  Location: [facility] OR;  Service: General;;    OMENTECTOMY  5/10/2022    Procedure: OMENTECTOMY;  Surgeon: Maury Liu MD;  Location: [facility] OR;  Service: General;;  partial    PARTIAL GASTRECTOMY N/A 5/10/2022    Procedure: GASTRECTOMY, PARTIAL;  Surgeon: Maury Liu MD;  Location: [facility] OR;  Service: General;  Laterality: N/A;    SUBTOTAL COLECTOMY Left 5/10/2022    Procedure: COLECTOMY, PARTIAL;  Surgeon: Maury Liu MD;  Location: [facility];  Service: General;  Laterality: Left;  LEFT VS SUBTOTAl vs EXTENDED RIGHT     No family history on file.  Social History     Socioeconomic History    Marital status:    Tobacco Use    Smoking status: Never    Smokeless tobacco: Never   Substance and Sexual Activity    Alcohol use: Yes     Alcohol/week: 2.0 standard drinks     Types: 2 Glasses of wine per week     Comment: occ    Drug use: Never    Sexual activity: Not Currently     Partners: Male     Birth control/protection: Post-menopausal     Patient Active Problem List   Diagnosis    Microcytic anemia    Thrombocytosis    Malignant neoplasm of sigmoid colon    Primary hypertension    Gross hematuria     Vascular disorder of intestine, unspecified     Review of patient's allergies indicates:  No Known Allergies    Health Maintenance  Health Maintenance Topics with due status: Not Due       Topic Last Completion Date    Lipid Panel 03/08/2022    Colorectal Cancer Screening 03/28/2022    Influenza Vaccine Not Due     Health Maintenance Due   Topic Date Due    Pneumococcal Vaccines (Age 65+) (1 - PCV) Never done    TETANUS VACCINE  Never done    Mammogram  Never done    Shingles Vaccine (1 of 2) Never done    Hemoglobin A1c (Diabetic Prevention Screening)  Never done    DEXA Scan  Never done    COVID-19 Vaccine (4 - Booster for Pfizer series) 03/08/2022       Medications:  No current outpatient medications on file prior to visit.     No current facility-administered medications on file prior to visit.       Medications have been reviewed and reconciled with patient at visit today.    Barriers to medications present (no )    Adverse reactions to current medications (no)    Over the counter medications reviewed (Yes) and if needed added to active Medication list.    Exam:  Vitals:    05/12/23 1517   BP: 132/84   Pulse: 90   Resp: 18     Weight: 95.6 kg (210 lb 12.2 oz)   Body mass index is 31.12 kg/m².      Physical Exam  Vitals reviewed.   Constitutional:       General: She is not in acute distress.     Appearance: Normal appearance.   HENT:      Head: Normocephalic and atraumatic.      Right Ear: External ear normal.      Left Ear: External ear normal.      Nose: Nose normal.   Eyes:      General:         Right eye: No discharge.         Left eye: No discharge.      Pupils: Pupils are equal, round, and reactive to light.   Neck:      Thyroid: No thyromegaly.   Cardiovascular:      Rate and Rhythm: Normal rate and regular rhythm.      Heart sounds: Normal heart sounds. No murmur heard.  Pulmonary:      Effort: Pulmonary effort is normal. No respiratory distress.      Breath sounds: Normal breath sounds. No wheezing.    Abdominal:      General: Bowel sounds are normal. There is no distension.      Palpations: Abdomen is soft.      Tenderness: There is no abdominal tenderness.   Musculoskeletal:         General: Normal range of motion.      Cervical back: Normal range of motion and neck supple.   Skin:     General: Skin is warm and dry.      Findings: No rash.   Neurological:      Mental Status: She is alert and oriented to person, place, and time.      Coordination: Coordination normal.   Psychiatric:         Behavior: Behavior normal.       Laboratory Reviewed: (Yes)  Lab Results   Component Value Date    WBC 3.87 (L) 09/12/2022    HGB 10.1 (L) 09/12/2022    HCT 33.8 (L) 09/12/2022     09/12/2022    CHOL 211 (H) 03/08/2022    TRIG 65 03/08/2022    HDL 54 03/08/2022    ALT 10 09/12/2022    AST 17 09/12/2022     09/12/2022    K 3.6 09/12/2022     09/12/2022    CREATININE 0.8 04/11/2023    BUN 15 09/12/2022    CO2 26 09/12/2022    TSH 1.710 04/20/2022       Assessment:  The primary encounter diagnosis was Routine physical examination. Diagnoses of Encounter for screening for diabetes mellitus, Essential hypertension, Gastroesophageal reflux disease, unspecified whether esophagitis present, Allergic rhinitis, unspecified seasonality, unspecified trigger, Abnormal finding of blood chemistry, unspecified, Vascular disorder of intestine, unspecified, and Encounter for screening for malignant neoplasm of breast, unspecified screening modality were also pertinent to this visit.    Plan:  Routine physical examination    Encounter for screening for diabetes mellitus  -     Hemoglobin A1C; Future; Expected date: 05/12/2023    Essential hypertension  -     Hemoglobin A1C; Future; Expected date: 05/12/2023  -     amLODIPine (NORVASC) 5 MG tablet; Take 1 tablet (5 mg total) by mouth once daily.  Dispense: 90 tablet; Refill: 1  -     Lipid Panel; Future; Expected date: 05/12/2023  -     COMPREHENSIVE METABOLIC PANEL; Future;  Expected date: 05/12/2023    Gastroesophageal reflux disease, unspecified whether esophagitis present  -     pantoprazole (PROTONIX) 40 MG tablet; Take 1 tablet (40 mg total) by mouth 2 (two) times daily.  Dispense: 180 tablet; Refill: 1    Allergic rhinitis, unspecified seasonality, unspecified trigger  -     montelukast (SINGULAIR) 10 mg tablet; Take 1 tablet (10 mg total) by mouth every evening.  Dispense: 90 tablet; Refill: 1    Abnormal finding of blood chemistry, unspecified  -     Hemoglobin A1C; Future; Expected date: 05/12/2023  -     CBC Auto Differential; Future; Expected date: 05/12/2023    Vascular disorder of intestine, unspecified  -     Lipid Panel; Future; Expected date: 05/12/2023    Encounter for screening for malignant neoplasm of breast, unspecified screening modality  -     US Breast Bilateral Complete; Future; Expected date: 05/12/2023    Other orders  -     cyclobenzaprine (FLEXERIL) 5 MG tablet; Take 1 tablet (5 mg total) by mouth nightly as needed for Muscle spasms.  Dispense: 30 tablet; Refill: 3      -Patient's lab results were reviewed and discussed with patient  -Treatment options and alternatives were discussed with the patient. Patient expressed understanding. Patient was given the opportunity to ask questions and be an active participant in their medical care. Patient had no further questions or concerns at this time.   -Documentation of patient's health and condition was obtained from family member who was present during visit.  -Patient is an overall moderate risk for health complications from their medical conditions.     Follow up: follow up as needed   1 year for annual      Care Plan/Goals: Reviewed N/A   Goals    None             After visit summary printed and given to patient upon discharge.  Patient goals and care plan are included in After visit summary.

## 2023-05-12 NOTE — PROGRESS NOTES
The pelvic sono results are available for review.  The uterus is normal in size  The endometrial lining is normal at 2.6 mm (should be <4mm)  Both ovaries are normal in size

## 2023-05-16 ENCOUNTER — TELEPHONE (OUTPATIENT)
Dept: INTERNAL MEDICINE | Facility: CLINIC | Age: 73
End: 2023-05-16
Payer: MEDICARE

## 2023-05-16 ENCOUNTER — LAB VISIT (OUTPATIENT)
Dept: LAB | Facility: HOSPITAL | Age: 73
End: 2023-05-16
Attending: FAMILY MEDICINE
Payer: MEDICARE

## 2023-05-16 ENCOUNTER — OFFICE VISIT (OUTPATIENT)
Dept: INTERNAL MEDICINE | Facility: CLINIC | Age: 73
End: 2023-05-16
Payer: MEDICARE

## 2023-05-16 VITALS
DIASTOLIC BLOOD PRESSURE: 98 MMHG | OXYGEN SATURATION: 96 % | HEART RATE: 81 BPM | SYSTOLIC BLOOD PRESSURE: 148 MMHG | BODY MASS INDEX: 30.97 KG/M2 | TEMPERATURE: 98 F | RESPIRATION RATE: 18 BRPM | HEIGHT: 69 IN | WEIGHT: 209.13 LBS

## 2023-05-16 DIAGNOSIS — I10 ESSENTIAL HYPERTENSION: ICD-10-CM

## 2023-05-16 DIAGNOSIS — H92.01 OTALGIA OF RIGHT EAR: ICD-10-CM

## 2023-05-16 DIAGNOSIS — R51.9 NONINTRACTABLE HEADACHE, UNSPECIFIED CHRONICITY PATTERN, UNSPECIFIED HEADACHE TYPE: Primary | ICD-10-CM

## 2023-05-16 DIAGNOSIS — R79.9 ABNORMAL FINDING OF BLOOD CHEMISTRY, UNSPECIFIED: ICD-10-CM

## 2023-05-16 DIAGNOSIS — Z13.1 ENCOUNTER FOR SCREENING FOR DIABETES MELLITUS: ICD-10-CM

## 2023-05-16 DIAGNOSIS — K55.9 VASCULAR DISORDER OF INTESTINE, UNSPECIFIED: ICD-10-CM

## 2023-05-16 DIAGNOSIS — M25.511 ACUTE PAIN OF BOTH SHOULDERS: ICD-10-CM

## 2023-05-16 DIAGNOSIS — M25.512 ACUTE PAIN OF BOTH SHOULDERS: ICD-10-CM

## 2023-05-16 LAB
ALBUMIN SERPL BCP-MCNC: 4 G/DL (ref 3.5–5.2)
ALP SERPL-CCNC: 79 U/L (ref 55–135)
ALT SERPL W/O P-5'-P-CCNC: 15 U/L (ref 10–44)
ANION GAP SERPL CALC-SCNC: 11 MMOL/L (ref 8–16)
AST SERPL-CCNC: 22 U/L (ref 10–40)
BASOPHILS # BLD AUTO: 0.03 K/UL (ref 0–0.2)
BASOPHILS NFR BLD: 0.6 % (ref 0–1.9)
BILIRUB SERPL-MCNC: 0.4 MG/DL (ref 0.1–1)
BUN SERPL-MCNC: 11 MG/DL (ref 8–23)
CALCIUM SERPL-MCNC: 9.7 MG/DL (ref 8.7–10.5)
CHLORIDE SERPL-SCNC: 102 MMOL/L (ref 95–110)
CHOLEST SERPL-MCNC: 263 MG/DL (ref 120–199)
CHOLEST/HDLC SERPL: 3.6 {RATIO} (ref 2–5)
CO2 SERPL-SCNC: 26 MMOL/L (ref 23–29)
CREAT SERPL-MCNC: 0.7 MG/DL (ref 0.5–1.4)
DIFFERENTIAL METHOD: ABNORMAL
EOSINOPHIL # BLD AUTO: 0 K/UL (ref 0–0.5)
EOSINOPHIL NFR BLD: 0.2 % (ref 0–8)
ERYTHROCYTE [DISTWIDTH] IN BLOOD BY AUTOMATED COUNT: 16.4 % (ref 11.5–14.5)
EST. GFR  (NO RACE VARIABLE): >60 ML/MIN/1.73 M^2
ESTIMATED AVG GLUCOSE: 111 MG/DL (ref 68–131)
GLUCOSE SERPL-MCNC: 83 MG/DL (ref 70–110)
HBA1C MFR BLD: 5.5 % (ref 4–5.6)
HCT VFR BLD AUTO: 38.2 % (ref 37–48.5)
HDLC SERPL-MCNC: 74 MG/DL (ref 40–75)
HDLC SERPL: 28.1 % (ref 20–50)
HGB BLD-MCNC: 11.7 G/DL (ref 12–16)
IMM GRANULOCYTES # BLD AUTO: 0.01 K/UL (ref 0–0.04)
IMM GRANULOCYTES NFR BLD AUTO: 0.2 % (ref 0–0.5)
LDLC SERPL CALC-MCNC: 176 MG/DL (ref 63–159)
LYMPHOCYTES # BLD AUTO: 1.8 K/UL (ref 1–4.8)
LYMPHOCYTES NFR BLD: 35.5 % (ref 18–48)
MCH RBC QN AUTO: 27.1 PG (ref 27–31)
MCHC RBC AUTO-ENTMCNC: 30.6 G/DL (ref 32–36)
MCV RBC AUTO: 89 FL (ref 82–98)
MONOCYTES # BLD AUTO: 0.3 K/UL (ref 0.3–1)
MONOCYTES NFR BLD: 6.2 % (ref 4–15)
NEUTROPHILS # BLD AUTO: 3 K/UL (ref 1.8–7.7)
NEUTROPHILS NFR BLD: 57.3 % (ref 38–73)
NONHDLC SERPL-MCNC: 189 MG/DL
NRBC BLD-RTO: 0 /100 WBC
PLATELET # BLD AUTO: 235 K/UL (ref 150–450)
PMV BLD AUTO: 9.7 FL (ref 9.2–12.9)
POTASSIUM SERPL-SCNC: 4 MMOL/L (ref 3.5–5.1)
PROT SERPL-MCNC: 8 G/DL (ref 6–8.4)
RBC # BLD AUTO: 4.31 M/UL (ref 4–5.4)
SODIUM SERPL-SCNC: 139 MMOL/L (ref 136–145)
TRIGL SERPL-MCNC: 65 MG/DL (ref 30–150)
WBC # BLD AUTO: 5.18 K/UL (ref 3.9–12.7)

## 2023-05-16 PROCEDURE — 3077F SYST BP >= 140 MM HG: CPT | Mod: CPTII,,, | Performed by: NURSE PRACTITIONER

## 2023-05-16 PROCEDURE — 1126F AMNT PAIN NOTED NONE PRSNT: CPT | Mod: CPTII,,, | Performed by: NURSE PRACTITIONER

## 2023-05-16 PROCEDURE — 80061 LIPID PANEL: CPT | Performed by: FAMILY MEDICINE

## 2023-05-16 PROCEDURE — 3080F PR MOST RECENT DIASTOLIC BLOOD PRESSURE >= 90 MM HG: ICD-10-PCS | Mod: CPTII,,, | Performed by: NURSE PRACTITIONER

## 2023-05-16 PROCEDURE — 1126F PR PAIN SEVERITY QUANTIFIED, NO PAIN PRESENT: ICD-10-PCS | Mod: CPTII,,, | Performed by: NURSE PRACTITIONER

## 2023-05-16 PROCEDURE — 80053 COMPREHEN METABOLIC PANEL: CPT | Performed by: FAMILY MEDICINE

## 2023-05-16 PROCEDURE — 1160F RVW MEDS BY RX/DR IN RCRD: CPT | Mod: CPTII,,, | Performed by: NURSE PRACTITIONER

## 2023-05-16 PROCEDURE — 83036 HEMOGLOBIN GLYCOSYLATED A1C: CPT | Performed by: FAMILY MEDICINE

## 2023-05-16 PROCEDURE — 3008F PR BODY MASS INDEX (BMI) DOCUMENTED: ICD-10-PCS | Mod: CPTII,,, | Performed by: NURSE PRACTITIONER

## 2023-05-16 PROCEDURE — 3080F DIAST BP >= 90 MM HG: CPT | Mod: CPTII,,, | Performed by: NURSE PRACTITIONER

## 2023-05-16 PROCEDURE — 3008F BODY MASS INDEX DOCD: CPT | Mod: CPTII,,, | Performed by: NURSE PRACTITIONER

## 2023-05-16 PROCEDURE — 1160F PR REVIEW ALL MEDS BY PRESCRIBER/CLIN PHARMACIST DOCUMENTED: ICD-10-PCS | Mod: CPTII,,, | Performed by: NURSE PRACTITIONER

## 2023-05-16 PROCEDURE — 1159F PR MEDICATION LIST DOCUMENTED IN MEDICAL RECORD: ICD-10-PCS | Mod: CPTII,,, | Performed by: NURSE PRACTITIONER

## 2023-05-16 PROCEDURE — 36415 COLL VENOUS BLD VENIPUNCTURE: CPT | Performed by: FAMILY MEDICINE

## 2023-05-16 PROCEDURE — 3044F PR MOST RECENT HEMOGLOBIN A1C LEVEL <7.0%: ICD-10-PCS | Mod: CPTII,,, | Performed by: NURSE PRACTITIONER

## 2023-05-16 PROCEDURE — 99999 PR PBB SHADOW E&M-EST. PATIENT-LVL IV: CPT | Mod: PBBFAC,,, | Performed by: NURSE PRACTITIONER

## 2023-05-16 PROCEDURE — 1101F PR PT FALLS ASSESS DOC 0-1 FALLS W/OUT INJ PAST YR: ICD-10-PCS | Mod: CPTII,,, | Performed by: NURSE PRACTITIONER

## 2023-05-16 PROCEDURE — 3288F PR FALLS RISK ASSESSMENT DOCUMENTED: ICD-10-PCS | Mod: CPTII,,, | Performed by: NURSE PRACTITIONER

## 2023-05-16 PROCEDURE — 3044F HG A1C LEVEL LT 7.0%: CPT | Mod: CPTII,,, | Performed by: NURSE PRACTITIONER

## 2023-05-16 PROCEDURE — 1159F MED LIST DOCD IN RCRD: CPT | Mod: CPTII,,, | Performed by: NURSE PRACTITIONER

## 2023-05-16 PROCEDURE — 85025 COMPLETE CBC W/AUTO DIFF WBC: CPT | Performed by: FAMILY MEDICINE

## 2023-05-16 PROCEDURE — 3077F PR MOST RECENT SYSTOLIC BLOOD PRESSURE >= 140 MM HG: ICD-10-PCS | Mod: CPTII,,, | Performed by: NURSE PRACTITIONER

## 2023-05-16 PROCEDURE — 3288F FALL RISK ASSESSMENT DOCD: CPT | Mod: CPTII,,, | Performed by: NURSE PRACTITIONER

## 2023-05-16 PROCEDURE — 99214 PR OFFICE/OUTPT VISIT, EST, LEVL IV, 30-39 MIN: ICD-10-PCS | Mod: ,,, | Performed by: NURSE PRACTITIONER

## 2023-05-16 PROCEDURE — 1101F PT FALLS ASSESS-DOCD LE1/YR: CPT | Mod: CPTII,,, | Performed by: NURSE PRACTITIONER

## 2023-05-16 PROCEDURE — 99214 OFFICE O/P EST MOD 30 MIN: CPT | Mod: ,,, | Performed by: NURSE PRACTITIONER

## 2023-05-16 PROCEDURE — 99999 PR PBB SHADOW E&M-EST. PATIENT-LVL IV: ICD-10-PCS | Mod: PBBFAC,,, | Performed by: NURSE PRACTITIONER

## 2023-05-16 RX ORDER — IBUPROFEN 800 MG/1
800 TABLET ORAL EVERY 6 HOURS PRN
Qty: 30 TABLET | Refills: 1 | Status: SHIPPED | OUTPATIENT
Start: 2023-05-16

## 2023-05-16 RX ORDER — OFLOXACIN 3 MG/ML
10 SOLUTION AURICULAR (OTIC) DAILY
Qty: 10 ML | Refills: 0 | Status: SHIPPED | OUTPATIENT
Start: 2023-05-16 | End: 2023-05-21

## 2023-05-16 NOTE — PROGRESS NOTES
Ambar Duenas  05/17/2023  [id number]    Gin Vitale MD  Patient Care Team:  Gin Vitale MD as PCP - General (Internal Medicine)  Farooq Limon MD (Inactive) as Consulting Physician (Hematology and Oncology)          Visit Type:an urgent visit for a new problem    Chief Complaint:  Chief Complaint   Patient presents with    Fall       History of Present Illness:    71 yo female presents with co head pain after hitting the top of her head yesterday.  Denies lightheaded, weakness, photophobia, dizziness, HA, lesions or abrasions.  Also co bilateral shoulder pain and tightness for a couple of months.  Reports right ear pain for one week but denies sinus congestion or PND    History:  Past Medical History:   Diagnosis Date    Anemia, unspecified     Fibroids     GERD (gastroesophageal reflux disease)     Hyperlipidemia     Hypertension      Past Surgical History:   Procedure Laterality Date    COLONOSCOPY N/A 3/28/2022    Procedure: COLONOSCOPY;  Surgeon: Melanie Ballard MD;  Location: [facility];  Service: Endoscopy;  Laterality: N/A;    ESOPHAGOGASTRODUODENOSCOPY N/A 3/28/2022    Procedure: ESOPHAGOGASTRODUODENOSCOPY (EGD);  Surgeon: Melanie Ballard MD;  Location: [facility];  Service: Endoscopy;  Laterality: N/A;    HEMORRHOID SURGERY      HYSTEROSCOPY WITH DILATION AND CURETTAGE OF UTERUS N/A 5/10/2022    Procedure: HYSTEROSCOPY, WITH DILATION AND CURETTAGE OF UTERUS;  Surgeon: Madelyn West MD;  Location: [facility];  Service: OB/GYN;  Laterality: N/A;    INJECTION OF ANESTHETIC AGENT INTO TISSUE PLANE DEFINED BY TRANSVERSUS ABDOMINIS MUSCLE N/A 5/10/2022    Procedure: BLOCK, TRANSVERSUS ABDOMINIS PLANE;  Surgeon: Maury Liu MD;  Location: [facility] OR;  Service: General;  Laterality: N/A;    LIVER BIOPSY  5/10/2022    Procedure: BIOPSY, LIVER;  Surgeon: Maury Liu MD;  Location: [facility];  Service: General;;    OMENTECTOMY  5/10/2022    Procedure: OMENTECTOMY;  Surgeon: Maury MENCHACA  MD Noe;  Location: [facility] OR;  Service: General;;  partial    PARTIAL GASTRECTOMY N/A 5/10/2022    Procedure: GASTRECTOMY, PARTIAL;  Surgeon: Maury Liu MD;  Location: [facility] OR;  Service: General;  Laterality: N/A;    SUBTOTAL COLECTOMY Left 5/10/2022    Procedure: COLECTOMY, PARTIAL;  Surgeon: Maury Liu MD;  Location: [facility] OR;  Service: General;  Laterality: Left;  LEFT VS SUBTOTAl vs EXTENDED RIGHT     History reviewed. No pertinent family history.  Social History     Socioeconomic History    Marital status:    Tobacco Use    Smoking status: Never    Smokeless tobacco: Never   Substance and Sexual Activity    Alcohol use: Yes     Alcohol/week: 2.0 standard drinks     Types: 2 Glasses of wine per week     Comment: occ    Drug use: Never    Sexual activity: Not Currently     Partners: Male     Birth control/protection: Post-menopausal     Patient Active Problem List   Diagnosis    Microcytic anemia    Thrombocytosis    Malignant neoplasm of sigmoid colon    Primary hypertension    Gross hematuria    Vascular disorder of intestine, unspecified     Review of patient's allergies indicates:  No Known Allergies    The following were reviewed at this visit: active problem list, medication list, allergies, family history, social history, and health maintenance.    Medications:  Current Outpatient Medications on File Prior to Visit   Medication Sig Dispense Refill    amLODIPine (NORVASC) 5 MG tablet Take 1 tablet (5 mg total) by mouth once daily. 90 tablet 1    montelukast (SINGULAIR) 10 mg tablet Take 1 tablet (10 mg total) by mouth every evening. 90 tablet 1    pantoprazole (PROTONIX) 40 MG tablet Take 1 tablet (40 mg total) by mouth 2 (two) times daily. 180 tablet 1    [DISCONTINUED] cyclobenzaprine (FLEXERIL) 5 MG tablet Take 1 tablet (5 mg total) by mouth nightly as needed for Muscle spasms. 30 tablet 3     No current facility-administered medications on file prior to visit.       Medications  have been reviewed and reconciled with patient at this visit.  Barriers to medications reviewed with patient.    Adverse reactions to current medications reviewed with patient..    Over the counter medications reviewed and reconciled with patient.    Exam:  Wt Readings from Last 3 Encounters:   05/16/23 94.8 kg (209 lb 1.7 oz)   05/12/23 95.6 kg (210 lb 12.2 oz)   02/13/23 84.9 kg (187 lb 2.7 oz)     Temp Readings from Last 3 Encounters:   05/16/23 97.5 °F (36.4 °C)   09/12/22 97.7 °F (36.5 °C) (Temporal)   06/01/22 97.4 °F (36.3 °C) (Oral)     BP Readings from Last 3 Encounters:   05/16/23 (!) 148/98   05/12/23 132/84   02/09/23 (!) 150/90     Pulse Readings from Last 3 Encounters:   05/16/23 81   05/12/23 90   01/18/23 87     Body mass index is 30.88 kg/m².      Review of Systems   Constitutional:  Negative for fever.   HENT:  Positive for ear pain.    Respiratory:  Negative for cough, shortness of breath and wheezing.    Cardiovascular:  Negative for chest pain and palpitations.   Gastrointestinal:  Negative for nausea.   Musculoskeletal:         Shoulder tightness and pain   Neurological:  Negative for speech change, weakness and headaches.   All other systems reviewed and are negative.  Physical Exam  Vitals and nursing note reviewed.   Constitutional:       Appearance: Normal appearance. She is normal weight.   HENT:      Head: Normocephalic and atraumatic.      Comments: Head clean, dry and intact. No redness, swelling, bumps, abrasions or infestations. No pain to palpation     Right Ear: Tympanic membrane, ear canal and external ear normal.      Left Ear: Tympanic membrane, ear canal and external ear normal.      Nose: Nose normal.      Mouth/Throat:      Mouth: Mucous membranes are moist.      Pharynx: Oropharynx is clear.   Eyes:      Extraocular Movements: Extraocular movements intact.      Conjunctiva/sclera: Conjunctivae normal.      Pupils: Pupils are equal, round, and reactive to light.    Cardiovascular:      Rate and Rhythm: Normal rate and regular rhythm.      Pulses: Normal pulses.      Heart sounds: Normal heart sounds.   Pulmonary:      Effort: Pulmonary effort is normal.      Breath sounds: Normal breath sounds.   Abdominal:      General: Bowel sounds are normal.      Palpations: Abdomen is soft.   Musculoskeletal:         General: Normal range of motion.      Cervical back: Normal range of motion and neck supple.   Skin:     General: Skin is warm and dry.      Capillary Refill: Capillary refill takes less than 2 seconds.   Neurological:      General: No focal deficit present.      Mental Status: She is alert and oriented to person, place, and time.   Psychiatric:         Mood and Affect: Mood normal.         Behavior: Behavior normal.         Thought Content: Thought content normal.         Judgment: Judgment normal.       Laboratory Reviewed ({Yes)  Lab Results   Component Value Date    WBC 5.18 05/16/2023    HGB 11.7 (L) 05/16/2023    HCT 38.2 05/16/2023     05/16/2023    CHOL 263 (H) 05/16/2023    TRIG 65 05/16/2023    HDL 74 05/16/2023    ALT 15 05/16/2023    AST 22 05/16/2023     05/16/2023    K 4.0 05/16/2023     05/16/2023    CREATININE 0.7 05/16/2023    BUN 11 05/16/2023    CO2 26 05/16/2023    TSH 1.710 04/20/2022    HGBA1C 5.5 05/16/2023       Ambar was seen today for fall.    Diagnoses and all orders for this visit:    Nonintractable headache, unspecified chronicity pattern, unspecified headache type  -     ibuprofen (ADVIL,MOTRIN) 800 MG tablet; Take 1 tablet (800 mg total) by mouth every 6 (six) hours as needed.    Otalgia of right ear  -     ofloxacin (FLOXIN) 0.3 % otic solution; Place 10 drops into the left ear once daily. for 5 days    Acute pain of both shoulders  -     cyclobenzaprine (FLEXERIL) 5 MG tablet; Take 1 tablet (5 mg total) by mouth nightly as needed for Muscle spasms.        Care Plan/Goals: Reviewed    Goals    None       Ambar was seen  today for fall.    Diagnoses and all orders for this visit:    Nonintractable headache, unspecified chronicity pattern, unspecified headache type  -     ibuprofen (ADVIL,MOTRIN) 800 MG tablet; Take 1 tablet (800 mg total) by mouth every 6 (six) hours as needed.    Otalgia of right ear  -     ofloxacin (FLOXIN) 0.3 % otic solution; Place 10 drops into the left ear once daily. for 5 days    Acute pain of both shoulders  -     cyclobenzaprine (FLEXERIL) 5 MG tablet; Take 1 tablet (5 mg total) by mouth nightly as needed for Muscle spasms.       Follow up: Follow up if symptoms worsen or fail to improve.    After visit summary was printed and given to patient upon discharge today.  Patient goals and care plan are included in After Visit Summary.

## 2023-05-16 NOTE — TELEPHONE ENCOUNTER
----- Message from Ashlyn Huber sent at 5/16/2023  9:00 AM CDT -----  Contact: [phone]  Pt is calling stating she had an incident where she hit her head yesterday(isnt hurting) but she is requesting a CT scan to make sure everything is fine . Please call back at [phone] . Thanksdj

## 2023-05-17 ENCOUNTER — HOSPITAL ENCOUNTER (OUTPATIENT)
Dept: RADIOLOGY | Facility: HOSPITAL | Age: 73
Discharge: HOME | End: 2023-05-17
Attending: NURSE PRACTITIONER
Payer: MEDICARE

## 2023-05-17 ENCOUNTER — TELEPHONE (OUTPATIENT)
Dept: INTERNAL MEDICINE | Facility: CLINIC | Age: 73
End: 2023-05-17
Payer: MEDICARE

## 2023-05-17 DIAGNOSIS — R51.9 NONINTRACTABLE HEADACHE, UNSPECIFIED CHRONICITY PATTERN, UNSPECIFIED HEADACHE TYPE: ICD-10-CM

## 2023-05-17 PROCEDURE — 70450 CT HEAD WITHOUT CONTRAST: ICD-10-PCS | Mod: 26,,, | Performed by: STUDENT IN AN ORGANIZED HEALTH CARE EDUCATION/TRAINING PROGRAM

## 2023-05-17 PROCEDURE — 70450 CT HEAD/BRAIN W/O DYE: CPT | Mod: 26,,, | Performed by: STUDENT IN AN ORGANIZED HEALTH CARE EDUCATION/TRAINING PROGRAM

## 2023-05-17 PROCEDURE — 70450 CT HEAD/BRAIN W/O DYE: CPT | Mod: TC

## 2023-05-17 RX ORDER — CYCLOBENZAPRINE HCL 5 MG
5 TABLET ORAL NIGHTLY PRN
Qty: 15 TABLET | Refills: 0 | Status: SHIPPED | OUTPATIENT
Start: 2023-05-17 | End: 2023-09-14

## 2023-05-17 NOTE — TELEPHONE ENCOUNTER
Returned patients phone call. Patient stated that she came to the doctor yesterday in regards to hitting her head. Patient stated that she now has a knot on the right side of her head would like to know what to do about it?

## 2023-05-17 NOTE — TELEPHONE ENCOUNTER
Contacted patient informed her that if she gets a headache ,blurred vision, weakness, lightheaded, difficulty speaking or walking go to the ER for head CT. Patient verbalized understanding.

## 2023-05-17 NOTE — TELEPHONE ENCOUNTER
Please advise her to apply ice to her scalp. If she gets a HA, blurred vision, weakness, lightheaded, difficulty speaking or walking go to the ER for head CT

## 2023-05-17 NOTE — TELEPHONE ENCOUNTER
----- Message from Spenser Chun sent at 5/17/2023  7:28 AM CDT -----  Contact: [phone]  Patient would like to consult with a nurse in regards to orders for a xray she stated she wants to know should she go to urgent care for the knot on her head. Please call to advise at [phone]. Thanks

## 2023-05-18 DIAGNOSIS — M25.551 BILATERAL HIP PAIN: Primary | ICD-10-CM

## 2023-05-18 DIAGNOSIS — M25.552 BILATERAL HIP PAIN: Primary | ICD-10-CM

## 2023-05-19 ENCOUNTER — OFFICE VISIT (OUTPATIENT)
Dept: SPORTS MEDICINE | Facility: CLINIC | Age: 73
End: 2023-05-19
Payer: MEDICARE

## 2023-05-19 ENCOUNTER — HOSPITAL ENCOUNTER (OUTPATIENT)
Dept: RADIOLOGY | Facility: HOSPITAL | Age: 73
Discharge: HOME | End: 2023-05-19
Attending: STUDENT IN AN ORGANIZED HEALTH CARE EDUCATION/TRAINING PROGRAM
Payer: MEDICARE

## 2023-05-19 ENCOUNTER — TELEPHONE (OUTPATIENT)
Dept: SPORTS MEDICINE | Facility: CLINIC | Age: 73
End: 2023-05-19
Payer: MEDICARE

## 2023-05-19 VITALS — HEIGHT: 71 IN | BODY MASS INDEX: 29.56 KG/M2 | WEIGHT: 211.19 LBS | RESPIRATION RATE: 20 BRPM

## 2023-05-19 DIAGNOSIS — M79.652 PAIN IN BOTH THIGHS: ICD-10-CM

## 2023-05-19 DIAGNOSIS — M25.551 BILATERAL HIP PAIN: ICD-10-CM

## 2023-05-19 DIAGNOSIS — M25.552 BILATERAL HIP PAIN: ICD-10-CM

## 2023-05-19 DIAGNOSIS — M62.89 MUSCLE STIFFNESS: Primary | ICD-10-CM

## 2023-05-19 DIAGNOSIS — M79.651 PAIN IN BOTH THIGHS: ICD-10-CM

## 2023-05-19 PROCEDURE — 1125F AMNT PAIN NOTED PAIN PRSNT: CPT | Mod: CPTII,,, | Performed by: STUDENT IN AN ORGANIZED HEALTH CARE EDUCATION/TRAINING PROGRAM

## 2023-05-19 PROCEDURE — 99999 PR PBB SHADOW E&M-EST. PATIENT-LVL III: ICD-10-PCS | Mod: PBBFAC,,, | Performed by: STUDENT IN AN ORGANIZED HEALTH CARE EDUCATION/TRAINING PROGRAM

## 2023-05-19 PROCEDURE — 3008F BODY MASS INDEX DOCD: CPT | Mod: CPTII,,, | Performed by: STUDENT IN AN ORGANIZED HEALTH CARE EDUCATION/TRAINING PROGRAM

## 2023-05-19 PROCEDURE — 1101F PT FALLS ASSESS-DOCD LE1/YR: CPT | Mod: CPTII,,, | Performed by: STUDENT IN AN ORGANIZED HEALTH CARE EDUCATION/TRAINING PROGRAM

## 2023-05-19 PROCEDURE — 1101F PR PT FALLS ASSESS DOC 0-1 FALLS W/OUT INJ PAST YR: ICD-10-PCS | Mod: CPTII,,, | Performed by: STUDENT IN AN ORGANIZED HEALTH CARE EDUCATION/TRAINING PROGRAM

## 2023-05-19 PROCEDURE — 99203 OFFICE O/P NEW LOW 30 MIN: CPT | Mod: ,,, | Performed by: STUDENT IN AN ORGANIZED HEALTH CARE EDUCATION/TRAINING PROGRAM

## 2023-05-19 PROCEDURE — 3008F PR BODY MASS INDEX (BMI) DOCUMENTED: ICD-10-PCS | Mod: CPTII,,, | Performed by: STUDENT IN AN ORGANIZED HEALTH CARE EDUCATION/TRAINING PROGRAM

## 2023-05-19 PROCEDURE — 1125F PR PAIN SEVERITY QUANTIFIED, PAIN PRESENT: ICD-10-PCS | Mod: CPTII,,, | Performed by: STUDENT IN AN ORGANIZED HEALTH CARE EDUCATION/TRAINING PROGRAM

## 2023-05-19 PROCEDURE — 3288F FALL RISK ASSESSMENT DOCD: CPT | Mod: CPTII,,, | Performed by: STUDENT IN AN ORGANIZED HEALTH CARE EDUCATION/TRAINING PROGRAM

## 2023-05-19 PROCEDURE — 73521 X-RAY EXAM HIPS BI 2 VIEWS: CPT | Mod: TC

## 2023-05-19 PROCEDURE — 1159F MED LIST DOCD IN RCRD: CPT | Mod: CPTII,,, | Performed by: STUDENT IN AN ORGANIZED HEALTH CARE EDUCATION/TRAINING PROGRAM

## 2023-05-19 PROCEDURE — 3288F PR FALLS RISK ASSESSMENT DOCUMENTED: ICD-10-PCS | Mod: CPTII,,, | Performed by: STUDENT IN AN ORGANIZED HEALTH CARE EDUCATION/TRAINING PROGRAM

## 2023-05-19 PROCEDURE — 73521 XR HIPS BILATERAL 2 VIEW INCL AP PELVIS: ICD-10-PCS | Mod: 26,,, | Performed by: RADIOLOGY

## 2023-05-19 PROCEDURE — 3044F HG A1C LEVEL LT 7.0%: CPT | Mod: CPTII,,, | Performed by: STUDENT IN AN ORGANIZED HEALTH CARE EDUCATION/TRAINING PROGRAM

## 2023-05-19 PROCEDURE — 3044F PR MOST RECENT HEMOGLOBIN A1C LEVEL <7.0%: ICD-10-PCS | Mod: CPTII,,, | Performed by: STUDENT IN AN ORGANIZED HEALTH CARE EDUCATION/TRAINING PROGRAM

## 2023-05-19 PROCEDURE — 73521 X-RAY EXAM HIPS BI 2 VIEWS: CPT | Mod: 26,,, | Performed by: RADIOLOGY

## 2023-05-19 PROCEDURE — 99999 PR PBB SHADOW E&M-EST. PATIENT-LVL III: CPT | Mod: PBBFAC,,, | Performed by: STUDENT IN AN ORGANIZED HEALTH CARE EDUCATION/TRAINING PROGRAM

## 2023-05-19 PROCEDURE — 1159F PR MEDICATION LIST DOCUMENTED IN MEDICAL RECORD: ICD-10-PCS | Mod: CPTII,,, | Performed by: STUDENT IN AN ORGANIZED HEALTH CARE EDUCATION/TRAINING PROGRAM

## 2023-05-19 PROCEDURE — 99203 PR OFFICE/OUTPT VISIT, NEW, LEVL III, 30-44 MIN: ICD-10-PCS | Mod: ,,, | Performed by: STUDENT IN AN ORGANIZED HEALTH CARE EDUCATION/TRAINING PROGRAM

## 2023-05-19 NOTE — TELEPHONE ENCOUNTER
Spoke  with patient advise  xray is needed for upcoming appt , patient verbalized understanding.    ----- Message from Ebony Grey sent at 5/19/2023  8:30 AM CDT -----  Regarding: call back  Name of Who is Calling: HERNAN LAWS [[id number]]      What is the request in detail: Pt did a bone density test on yesterday and would like to know if todays xray is needed before the appt with the provider. Please advise.       Can the clinic reply by [facility]: no       What Number to Call Back if not in [facility]: Telephone Information:  Mobile          [phone]

## 2023-05-19 NOTE — PROGRESS NOTES
Patient ID: Ambar Duenas  YOB: 1950  MRN: [id number]    Chief Complaint: Pain of the Right Hip    Referred By: Self for right thigh stiffness    History of Present Illness: Ambar Duenas is dominant 72 y.o. female who presents today with right hip and thigh stiffness.     The patient is active in dancing.  Occupation: Dance instructor    Ambar Duenas states it is Chronic in nature and there was not a specific mechanism.  Ambar Duenas describes the pain as a intermittent stiffness. Treatment to date includes walking and prescribed medications. They believe that they are unchanged with this treatment. Current pain level at rest is 1/10.  Associated symptoms include: Swelling No, Instability No, Pain that affects your sleep No, Mechanical No, locking/catching No, Neurological No, limited range of motion No.    Aggravating activities include squatting and bending.   Alleviating activities include walking and prescribed medications.       Hemoglobin A1C   Date Value Ref Range Status   05/16/2023 5.5 4.0 - 5.6 % Final     Comment:     ADA Screening Guidelines:  5.7-6.4%  Consistent with prediabetes  >or=6.5%  Consistent with diabetes    High levels of fetal hemoglobin interfere with the HbA1C  assay. Heterozygous hemoglobin variants (HbS, HgC, etc)do  not significantly interfere with this assay.   However, presence of multiple variants may affect accuracy.           Past Medical History:   Past Medical History:   Diagnosis Date    Anemia, unspecified     Fibroids     GERD (gastroesophageal reflux disease)     Hyperlipidemia     Hypertension      Past Surgical History:   Procedure Laterality Date    COLONOSCOPY N/A 3/28/2022    Procedure: COLONOSCOPY;  Surgeon: Melanie Ballard MD;  Location: [facility];  Service: Endoscopy;  Laterality: N/A;    ESOPHAGOGASTRODUODENOSCOPY N/A 3/28/2022    Procedure: ESOPHAGOGASTRODUODENOSCOPY (EGD);  Surgeon: Melanie Ballard MD;  Location:  [facility] END[facility];  Service: Endoscopy;  Laterality: N/A;    HEMORRHOID SURGERY      HYSTEROSCOPY WITH DILATION AND CURETTAGE OF UTERUS N/A 5/10/2022    Procedure: HYSTEROSCOPY, WITH DILATION AND CURETTAGE OF UTERUS;  Surgeon: Madelyn West MD;  Location: [facility] OR;  Service: OB/GYN;  Laterality: N/A;    INJECTION OF ANESTHETIC AGENT INTO TISSUE PLANE DEFINED BY TRANSVERSUS ABDOMINIS MUSCLE N/A 5/10/2022    Procedure: BLOCK, TRANSVERSUS ABDOMINIS PLANE;  Surgeon: Maury Liu MD;  Location: [facility] OR;  Service: General;  Laterality: N/A;    LIVER BIOPSY  5/10/2022    Procedure: BIOPSY, LIVER;  Surgeon: Maury Liu MD;  Location: [facility] OR;  Service: General;;    OMENTECTOMY  5/10/2022    Procedure: OMENTECTOMY;  Surgeon: Maury Liu MD;  Location: [facility] OR;  Service: General;;  partial    PARTIAL GASTRECTOMY N/A 5/10/2022    Procedure: GASTRECTOMY, PARTIAL;  Surgeon: Maury Liu MD;  Location: [facility] OR;  Service: General;  Laterality: N/A;    SUBTOTAL COLECTOMY Left 5/10/2022    Procedure: COLECTOMY, PARTIAL;  Surgeon: Maury Liu MD;  Location: [facility];  Service: General;  Laterality: Left;  LEFT VS SUBTOTAl vs EXTENDED RIGHT     Family History   Problem Relation Age of Onset    No Known Problems Mother     No Known Problems Father      Social History     Socioeconomic History    Marital status:    Tobacco Use    Smoking status: Never     Passive exposure: Never    Smokeless tobacco: Never   Substance and Sexual Activity    Alcohol use: Yes     Alcohol/week: 2.0 standard drinks     Types: 2 Glasses of wine per week     Comment: occ    Drug use: Never    Sexual activity: Not Currently     Partners: Male     Birth control/protection: Post-menopausal     Medication List with Changes/Refills   Current Medications    AMLODIPINE (NORVASC) 5 MG TABLET    Take 1 tablet (5 mg total) by mouth once daily.    CYCLOBENZAPRINE (FLEXERIL) 5 MG TABLET    Take 1 tablet (5 mg total) by mouth nightly as  needed for Muscle spasms.    IBUPROFEN (ADVIL,MOTRIN) 800 MG TABLET    Take 1 tablet (800 mg total) by mouth every 6 (six) hours as needed.    MONTELUKAST (SINGULAIR) 10 MG TABLET    Take 1 tablet (10 mg total) by mouth every evening.    OFLOXACIN (FLOXIN) 0.3 % OTIC SOLUTION    Place 10 drops into the left ear once daily. for 5 days    PANTOPRAZOLE (PROTONIX) 40 MG TABLET    Take 1 tablet (40 mg total) by mouth 2 (two) times daily.     Review of patient's allergies indicates:  No Known Allergies    Physical Exam:   Body mass index is 29.46 kg/m².    GENERAL: Well appearing, in no acute distress.  HEAD: Normocephalic and atraumatic.  ENT: External ears and nose grossly normal.  EYES: EOMI bilaterally  PULMONARY: Respirations are grossly even and non-labored.  NEURO: Awake, alert, and oriented x 3.  SKIN: No obvious rashes appreciated.  PSYCH: Mood & affect are appropriate.    Detailed MSK exam:     Full range of motion internal external rotation of the hip bilaterally negative FADIR negative SARA bilaterally great strength with supine hip flexion against resistance as well as knee extension against resistance.  No tenderness negative fulcrum sign.    Imaging:  X-Ray Hips Bilateral 2 View Incl AP Pelvis  EXAM: XR HIPS BILATERAL 2 VIEW INCL AP PELVIS    CLINICAL HISTORY: [M25.551]-Pain in right hip./[M25.552]-Pain in left hip.    FINDINGS:  Frontal view the pelvis and 2 views of each hip.    No acute fracture or dislocation.  Minimal bilateral hip degenerative changes.  Sacroiliac joints appear maintained.  No acute finding involving the sternum and soft tissues.    IMPRESSION:  No acute finding.    Finalized on: 5/19/2023 2:46 PM By:  Livan Cuevas MD  [facility]# [id number]      2023-05-19 14:48:[id number]    [facility]      Relevant imaging results were reviewed and interpreted by me and per my read as above.  This was discussed with the patient and / or family today.     Assessment:  Ambar Duenas is a 72 y.o. female presents  today for thigh tightness most likely due to some muscle soreness.  She is a very active individual and enjoys dancing and has great strength and range of motion of her hips today x-rays show some mild degenerative hip changes but does not appear to have any symptom changes with examination of her hips.  Discussed if anything changes or she starts having pain or inability to perform her daily tasks or activities that she enjoys for follow-up consider knee x-rays in the future if having any change as well.  Follow-up as needed    Muscle stiffness    Pain in both thighs         A copy of today's visit note has been sent to the referring provider.       Mumtaz Darling MD    Disclaimer: This note was prepared using a voice recognition system and is likely to have sound alike errors within the text.

## 2023-05-22 ENCOUNTER — LAB VISIT (OUTPATIENT)
Dept: LAB | Facility: HOSPITAL | Age: 73
End: 2023-05-22
Attending: COLON & RECTAL SURGERY
Payer: MEDICARE

## 2023-05-22 ENCOUNTER — OFFICE VISIT (OUTPATIENT)
Dept: SURGERY | Facility: CLINIC | Age: 73
End: 2023-05-22
Payer: MEDICARE

## 2023-05-22 VITALS — WEIGHT: 211.19 LBS | BODY MASS INDEX: 29.46 KG/M2

## 2023-05-22 DIAGNOSIS — Z85.038 PERSONAL HISTORY OF COLON CANCER: Primary | ICD-10-CM

## 2023-05-22 DIAGNOSIS — Z85.038 PERSONAL HISTORY OF COLON CANCER: ICD-10-CM

## 2023-05-22 LAB — CEA SERPL-MCNC: 1.9 NG/ML (ref 0–5)

## 2023-05-22 PROCEDURE — 3044F HG A1C LEVEL LT 7.0%: CPT | Mod: CPTII,S$GLB,, | Performed by: COLON & RECTAL SURGERY

## 2023-05-22 PROCEDURE — 1159F MED LIST DOCD IN RCRD: CPT | Mod: CPTII,S$GLB,, | Performed by: COLON & RECTAL SURGERY

## 2023-05-22 PROCEDURE — 1126F PR PAIN SEVERITY QUANTIFIED, NO PAIN PRESENT: ICD-10-PCS | Mod: CPTII,S$GLB,, | Performed by: COLON & RECTAL SURGERY

## 2023-05-22 PROCEDURE — 3008F BODY MASS INDEX DOCD: CPT | Mod: CPTII,S$GLB,, | Performed by: COLON & RECTAL SURGERY

## 2023-05-22 PROCEDURE — 3008F PR BODY MASS INDEX (BMI) DOCUMENTED: ICD-10-PCS | Mod: CPTII,S$GLB,, | Performed by: COLON & RECTAL SURGERY

## 2023-05-22 PROCEDURE — 1126F AMNT PAIN NOTED NONE PRSNT: CPT | Mod: CPTII,S$GLB,, | Performed by: COLON & RECTAL SURGERY

## 2023-05-22 PROCEDURE — 99999 PR PBB SHADOW E&M-EST. PATIENT-LVL II: ICD-10-PCS | Mod: PBBFAC,,, | Performed by: COLON & RECTAL SURGERY

## 2023-05-22 PROCEDURE — 99214 OFFICE O/P EST MOD 30 MIN: CPT | Mod: S$GLB,,, | Performed by: COLON & RECTAL SURGERY

## 2023-05-22 PROCEDURE — 99999 PR PBB SHADOW E&M-EST. PATIENT-LVL II: CPT | Mod: PBBFAC,,, | Performed by: COLON & RECTAL SURGERY

## 2023-05-22 PROCEDURE — 99214 PR OFFICE/OUTPT VISIT, EST, LEVL IV, 30-39 MIN: ICD-10-PCS | Mod: S$GLB,,, | Performed by: COLON & RECTAL SURGERY

## 2023-05-22 PROCEDURE — 3044F PR MOST RECENT HEMOGLOBIN A1C LEVEL <7.0%: ICD-10-PCS | Mod: CPTII,S$GLB,, | Performed by: COLON & RECTAL SURGERY

## 2023-05-22 PROCEDURE — 1159F PR MEDICATION LIST DOCUMENTED IN MEDICAL RECORD: ICD-10-PCS | Mod: CPTII,S$GLB,, | Performed by: COLON & RECTAL SURGERY

## 2023-05-22 PROCEDURE — 82378 CARCINOEMBRYONIC ANTIGEN: CPT | Performed by: COLON & RECTAL SURGERY

## 2023-05-22 PROCEDURE — 36415 COLL VENOUS BLD VENIPUNCTURE: CPT | Performed by: COLON & RECTAL SURGERY

## 2023-05-22 RX ORDER — SODIUM, POTASSIUM,MAG SULFATES 17.5-3.13G
1 SOLUTION, RECONSTITUTED, ORAL ORAL DAILY
Qty: 1 KIT | Refills: 0 | Status: SHIPPED | OUTPATIENT
Start: 2023-05-22 | End: 2023-05-24

## 2023-05-22 NOTE — PROGRESS NOTES
History & Physical    SUBJECTIVE:     Chief Complaint   Patient presents with    Follow-up     3 month     Ref: Melanie Ballard MD    History of Present Illness:  Patient is a 72 y.o. female presents forEvaluation of colonic adenocarcinoma.  Patient was undergoing surveillance colonoscopy in 2022 where she was found to have a almost completely obstructing colon mass that was biopsied and resulted as adenocarcinoma.  This was initially thought to be at the sigmoid colon upon imaging with CT scan of the chest abdomen pelvis, no metastatic disease was found but she was found to have a large fungating mass near the splenic flexure with a normal appearing sigmoid.  She has been asymptomatic but before the colonoscopy in since that time.  She denies any fever, chills, nausea, vomiting, hematochezia, melena or abdominal pain.  She only reports a minor amount of reflux that has since resolved with the addition of pantoprazole.  Her only significant past surgical history is  section via lower midline incision.  She denies any family history of colorectal cancer or IBD.  She has had previous colonic polyps on a colonoscopy around 7 years ago.  She reports she is still having normal bowel movements.    5/10/2022: open subtotal colectomy, partial gastrectomy (final path: Stage II T4N0 adenocarcinoma    Interval history:  Since last clinic visit, the     Review of patient's allergies indicates:  No Known Allergies    Current Outpatient Medications   Medication Sig Dispense Refill    amLODIPine (NORVASC) 5 MG tablet Take 1 tablet (5 mg total) by mouth once daily. 90 tablet 1    cyclobenzaprine (FLEXERIL) 5 MG tablet Take 1 tablet (5 mg total) by mouth nightly as needed for Muscle spasms. 15 tablet 0    ibuprofen (ADVIL,MOTRIN) 800 MG tablet Take 1 tablet (800 mg total) by mouth every 6 (six) hours as needed. 30 tablet 1    montelukast (SINGULAIR) 10 mg tablet Take 1 tablet (10 mg total) by mouth every evening. 90  tablet 1    pantoprazole (PROTONIX) 40 MG tablet Take 1 tablet (40 mg total) by mouth 2 (two) times daily. 180 tablet 1    sodium,potassium,mag sulfates (SUPREP BOWEL PREP KIT) 17.5-3.13-1.6 gram SolR Take 177 mLs by mouth once daily. for 2 days 1 kit 0     No current facility-administered medications for this visit.       Past Medical History:   Diagnosis Date    Anemia, unspecified     Fibroids     GERD (gastroesophageal reflux disease)     Hyperlipidemia     Hypertension      Past Surgical History:   Procedure Laterality Date    COLONOSCOPY N/A 3/28/2022    Procedure: COLONOSCOPY;  Surgeon: Melanie Ballard MD;  Location: [facility];  Service: Endoscopy;  Laterality: N/A;    ESOPHAGOGASTRODUODENOSCOPY N/A 3/28/2022    Procedure: ESOPHAGOGASTRODUODENOSCOPY (EGD);  Surgeon: Melanie Ballard MD;  Location: [facility];  Service: Endoscopy;  Laterality: N/A;    HEMORRHOID SURGERY      HYSTEROSCOPY WITH DILATION AND CURETTAGE OF UTERUS N/A 5/10/2022    Procedure: HYSTEROSCOPY, WITH DILATION AND CURETTAGE OF UTERUS;  Surgeon: Madelyn West MD;  [facility];  Service: OB/GYN;  Laterality: N/A;    INJECTION OF ANESTHETIC AGENT INTO TISSUE PLANE DEFINED BY TRANSVERSUS ABDOMINIS MUSCLE N/A 5/10/2022    Procedure: BLOCK, TRANSVERSUS ABDOMINIS PLANE;  Surgeon: Maury Liu MD;  Location: [facility];  Service: General;  Laterality: N/A;    LIVER BIOPSY  5/10/2022    Procedure: BIOPSY, LIVER;  Surgeon: Maury Liu MD;  Location: [facility] OR;  Service: General;;    OMENTECTOMY  5/10/2022    Procedure: OMENTECTOMY;  Surgeon: Maury Liu MD;  Location: [facility] OR;  Service: General;;  partial    PARTIAL GASTRECTOMY N/A 5/10/2022    Procedure: GASTRECTOMY, PARTIAL;  Surgeon: Maury Liu MD;  Location: [facility] OR;  Service: General;  Laterality: N/A;    SUBTOTAL COLECTOMY Left 5/10/2022    Procedure: COLECTOMY, PARTIAL;  Surgeon: Maury Liu MD;  Location: [facility];  Service: General;  Laterality:  Left;  LEFT VS SUBTOTAl vs EXTENDED RIGHT     Family History   Problem Relation Age of Onset    No Known Problems Mother     No Known Problems Father      Social History     Tobacco Use    Smoking status: Never     Passive exposure: Never    Smokeless tobacco: Never   Substance Use Topics    Alcohol use: Yes     Alcohol/week: 2.0 standard drinks     Types: 2 Glasses of wine per week     Comment: occ    Drug use: Never        Review of Systems:  Review of Systems   Constitutional:  Negative for activity change, appetite change, chills, fatigue, fever and unexpected weight change.   HENT:  Negative for congestion, ear pain, sore throat and trouble swallowing.    Eyes:  Negative for pain, redness and itching.   Respiratory:  Negative for cough, shortness of breath and wheezing.    Cardiovascular:  Negative for chest pain, palpitations and leg swelling.   Gastrointestinal:  Negative for abdominal distention, abdominal pain, anal bleeding, blood in stool, constipation, diarrhea, nausea, rectal pain and vomiting.   Endocrine: Negative for cold intolerance, heat intolerance and polyuria.   Genitourinary:  Negative for dysuria, flank pain, frequency and hematuria.   Musculoskeletal:  Negative for gait problem, joint swelling and neck pain.   Skin:  Negative for color change, rash and wound.   Allergic/Immunologic: Negative for environmental allergies and immunocompromised state.   Neurological:  Negative for dizziness, speech difficulty, weakness and numbness.   Psychiatric/Behavioral:  Negative for agitation, confusion and hallucinations.      OBJECTIVE:     Vital Signs (Most Recent)        95.8 kg (211 lb 3.2 oz)     Physical Exam:  Physical Exam  Constitutional:       Appearance: She is well-developed.   HENT:      Head: Normocephalic and atraumatic.   Eyes:      Conjunctiva/sclera: Conjunctivae normal.   Neck:      Thyroid: No thyromegaly.   Cardiovascular:      Rate and Rhythm: Normal rate and regular rhythm.    Pulmonary:      Effort: Pulmonary effort is normal. No respiratory distress.   Abdominal:      Comments: Soft, nondistended, nontender; well-healed midline incision   Musculoskeletal:         General: No tenderness. Normal range of motion.      Cervical back: Normal range of motion.   Skin:     General: Skin is warm and dry.      Capillary Refill: Capillary refill takes less than 2 seconds.      Findings: No rash.   Neurological:      Mental Status: She is alert and oriented to person, place, and time.       Laboratory  Lab Results   Component Value Date    WBC 5.18 05/16/2023    HGB 11.7 (L) 05/16/2023    HCT 38.2 05/16/2023     05/16/2023    CHOL 263 (H) 05/16/2023    TRIG 65 05/16/2023    HDL 74 05/16/2023    ALT 15 05/16/2023    AST 22 05/16/2023     05/16/2023    K 4.0 05/16/2023     05/16/2023    CREATININE 0.7 05/16/2023    BUN 11 05/16/2023    CO2 26 05/16/2023    TSH 1.710 04/20/2022    HGBA1C 5.5 05/16/2023       Component Ref Range & Units 3 mo ago 8 mo ago 1 yr ago   CEA 0.0 - 5.0 ng/mL 2.2  2.1 CM  3.2 CM        Diagnostic Results:  CT: Reviewed  Colonoscopy: reviewed    CT April 2023:  FINDINGS: The lungs are clear.  No endobronchial or endotracheal lesions are evident.  No pathologically enlarged mediastinal, hilar, or axillary lymph nodes.  No aortic aneurysm or dissection.  There is no cardiomegaly or pericardial effusion.  Normal-appearing esophagus.     No focal liver abnormality with patent portal veins and hepatic veins.  The gallbladder and biliary ducts are normal in appearance.  The spleen, pancreas, and adrenal glands are unremarkable.  There are multiple punctate, nonobstructing stones within the bilateral kidneys with extrarenal pelves bilaterally.  No signs of ureteral stone or obstructive uropathy.  No enhancing renal masses.  The renal veins are patent.  The bladder is unremarkable.     There is a ventral hernia containing fat with a neck that measures 21 mm  craniocaudal x 54 mm transverse spine just anterior to the gastric antrum.  Additional fat-containing ventral hernia more inferiorly with a neck measuring 12 x 11 mm.  No herniated bowel loops.     Prior surgery upon the stomach.  The stomach is otherwise not unusual in appearance.  Small bowel loops are normal in caliber.  Prior right hemicolectomy with surgical anastomosis involving the colon within the left upper quadrant.  The surgical anastomosis is normal in appearance.  No masslike abnormality near the junction.  There is stool throughout the remaining colon extending into the rectum.  No evidence of bowel obstruction or acute bowel inflammation.  No free fluid or free air.     No abdominal aortic aneurysm.  No pathologically enlarged lymph nodes within the abdomen or pelvis.     Calcified structure within the cervix may be postsurgical in nature along  's of a cerclage.  The uterus and ovaries are unremarkable.     No acute osseous findings.  Advanced degenerative change of the sacroiliac joints with joint space sclerosis and vacuum phenomenon.  Advanced facet arthropathy inferiorly with spondylolisthesis at L4-L5 and L5-S1.        Impression:     1.  No evidence of metastatic disease or recurrent malignant disease.  2.  Punctate nonobstructing kidney stones bilaterally.  3.  There are 2 fat-containing ventral hernias.  No herniated bowel loops.  4.  Potential constipation.  5.  Possible old surgical cerclage of the cervix.  6.  Advanced degenerative spine change inferiorly with advanced SI joint degeneration.      ASSESSMENT/PLAN:     71yo F with colonic adenocarcinoma now s/p subtotal colectomy with partial gastrectomy on 5/10/22 with final path showing Stage II T4bN0 adenocarcinoma with negative margins    - CEA level today and q3 months  - CT q6-12 months  - cscope due now, will plan for June 2023 per patient request. Suprep called in   - RTC 3 months for surveillance    Maury Liu MD  Colon and  Rectal Surgery  [facility]

## 2023-06-09 ENCOUNTER — OFFICE VISIT (OUTPATIENT)
Dept: OBSTETRICS AND GYNECOLOGY | Facility: CLINIC | Age: 73
End: 2023-06-09
Payer: MEDICARE

## 2023-06-09 VITALS
WEIGHT: 211.88 LBS | HEIGHT: 71 IN | SYSTOLIC BLOOD PRESSURE: 140 MMHG | DIASTOLIC BLOOD PRESSURE: 82 MMHG | BODY MASS INDEX: 29.66 KG/M2

## 2023-06-09 DIAGNOSIS — Z87.42 HISTORY OF POSTMENOPAUSAL BLEEDING: Primary | ICD-10-CM

## 2023-06-09 PROCEDURE — 3079F DIAST BP 80-89 MM HG: CPT | Mod: CPTII,S$GLB,, | Performed by: OBSTETRICS & GYNECOLOGY

## 2023-06-09 PROCEDURE — 3077F PR MOST RECENT SYSTOLIC BLOOD PRESSURE >= 140 MM HG: ICD-10-PCS | Mod: CPTII,S$GLB,, | Performed by: OBSTETRICS & GYNECOLOGY

## 2023-06-09 PROCEDURE — 3044F PR MOST RECENT HEMOGLOBIN A1C LEVEL <7.0%: ICD-10-PCS | Mod: CPTII,S$GLB,, | Performed by: OBSTETRICS & GYNECOLOGY

## 2023-06-09 PROCEDURE — 99999 PR PBB SHADOW E&M-EST. PATIENT-LVL III: ICD-10-PCS | Mod: PBBFAC,,, | Performed by: OBSTETRICS & GYNECOLOGY

## 2023-06-09 PROCEDURE — 1159F MED LIST DOCD IN RCRD: CPT | Mod: CPTII,S$GLB,, | Performed by: OBSTETRICS & GYNECOLOGY

## 2023-06-09 PROCEDURE — 99999 PR PBB SHADOW E&M-EST. PATIENT-LVL III: CPT | Mod: PBBFAC,,, | Performed by: OBSTETRICS & GYNECOLOGY

## 2023-06-09 PROCEDURE — 99213 PR OFFICE/OUTPT VISIT, EST, LEVL III, 20-29 MIN: ICD-10-PCS | Mod: S$GLB,,, | Performed by: OBSTETRICS & GYNECOLOGY

## 2023-06-09 PROCEDURE — 1159F PR MEDICATION LIST DOCUMENTED IN MEDICAL RECORD: ICD-10-PCS | Mod: CPTII,S$GLB,, | Performed by: OBSTETRICS & GYNECOLOGY

## 2023-06-09 PROCEDURE — 3288F PR FALLS RISK ASSESSMENT DOCUMENTED: ICD-10-PCS | Mod: CPTII,S$GLB,, | Performed by: OBSTETRICS & GYNECOLOGY

## 2023-06-09 PROCEDURE — 1126F AMNT PAIN NOTED NONE PRSNT: CPT | Mod: CPTII,S$GLB,, | Performed by: OBSTETRICS & GYNECOLOGY

## 2023-06-09 PROCEDURE — 3079F PR MOST RECENT DIASTOLIC BLOOD PRESSURE 80-89 MM HG: ICD-10-PCS | Mod: CPTII,S$GLB,, | Performed by: OBSTETRICS & GYNECOLOGY

## 2023-06-09 PROCEDURE — 3044F HG A1C LEVEL LT 7.0%: CPT | Mod: CPTII,S$GLB,, | Performed by: OBSTETRICS & GYNECOLOGY

## 2023-06-09 PROCEDURE — 3008F PR BODY MASS INDEX (BMI) DOCUMENTED: ICD-10-PCS | Mod: CPTII,S$GLB,, | Performed by: OBSTETRICS & GYNECOLOGY

## 2023-06-09 PROCEDURE — 3008F BODY MASS INDEX DOCD: CPT | Mod: CPTII,S$GLB,, | Performed by: OBSTETRICS & GYNECOLOGY

## 2023-06-09 PROCEDURE — 3288F FALL RISK ASSESSMENT DOCD: CPT | Mod: CPTII,S$GLB,, | Performed by: OBSTETRICS & GYNECOLOGY

## 2023-06-09 PROCEDURE — 1101F PR PT FALLS ASSESS DOC 0-1 FALLS W/OUT INJ PAST YR: ICD-10-PCS | Mod: CPTII,S$GLB,, | Performed by: OBSTETRICS & GYNECOLOGY

## 2023-06-09 PROCEDURE — 1126F PR PAIN SEVERITY QUANTIFIED, NO PAIN PRESENT: ICD-10-PCS | Mod: CPTII,S$GLB,, | Performed by: OBSTETRICS & GYNECOLOGY

## 2023-06-09 PROCEDURE — 99213 OFFICE O/P EST LOW 20 MIN: CPT | Mod: S$GLB,,, | Performed by: OBSTETRICS & GYNECOLOGY

## 2023-06-09 PROCEDURE — 3077F SYST BP >= 140 MM HG: CPT | Mod: CPTII,S$GLB,, | Performed by: OBSTETRICS & GYNECOLOGY

## 2023-06-09 PROCEDURE — 1101F PT FALLS ASSESS-DOCD LE1/YR: CPT | Mod: CPTII,S$GLB,, | Performed by: OBSTETRICS & GYNECOLOGY

## 2023-06-09 NOTE — PROGRESS NOTES
Subjective:       Patient ID: Ambar Duenas is a 72 y.o. female.    Chief Complaint:  Follow-up      History of Present Illness  Follow-up    Here for follow up  review of sono results  Denies vaginal bleeding    Denies pelvic pain  GYN & OB History  Patient's last menstrual period was 2019.   Date of Last Pap: No result found    OB History    Para Term  AB Living   1         1   SAB IAB Ectopic Multiple Live Births                  # Outcome Date GA Lbr Jordan/2nd Weight Sex Delivery Anes PTL Lv   1                 Review of Systems  Review of Systems   All other systems reviewed and are negative.        Objective:      Physical Exam:   Constitutional: She appears well-developed.     Eyes: Pupils are equal, round, and reactive to light. Conjunctivae and EOM are normal.      Pulmonary/Chest: Effort normal.        Abdominal: Soft.             Musculoskeletal: Normal range of motion.       Neurological: She is alert.    Skin: Skin is warm.    Psychiatric: She has a normal mood and affect.         Assessment:     Encounter Diagnosis   Name Primary?    History of postmenopausal bleeding Yes                Plan:      Pelvic sono findings reviewed     Uterus:     Size: 8.3 x 3.0 x 5.7 cm     Masses: None.  Cerclage again noted in cervix.     Endometrium: Normal in this pre menopausal patient, measuring 2.6 mm.     Right ovary:     Size: 2.2 x 1.2 x 1.6 cm     Appearance: Normal.     Vascular flow: Normal.     Left ovary:     Size: 2.3 x 0.9 x 1.9 cm     Appearance: Normal.  Pt aware if ever has any vaginal bleeding (pink, red, brown); would advise emb or hysteroscopy with d&c  Ww exam due after 2024

## 2023-06-12 ENCOUNTER — OFFICE VISIT (OUTPATIENT)
Dept: INTERNAL MEDICINE | Facility: CLINIC | Age: 73
End: 2023-06-12
Payer: MEDICARE

## 2023-06-12 VITALS
DIASTOLIC BLOOD PRESSURE: 88 MMHG | SYSTOLIC BLOOD PRESSURE: 138 MMHG | OXYGEN SATURATION: 99 % | TEMPERATURE: 98 F | RESPIRATION RATE: 18 BRPM | BODY MASS INDEX: 29.35 KG/M2 | HEIGHT: 71 IN | HEART RATE: 76 BPM | WEIGHT: 209.69 LBS

## 2023-06-12 DIAGNOSIS — I10 ESSENTIAL HYPERTENSION: Primary | ICD-10-CM

## 2023-06-12 DIAGNOSIS — E66.01 SEVERE OBESITY (BMI 35.0-35.9 WITH COMORBIDITY): ICD-10-CM

## 2023-06-12 DIAGNOSIS — Z71.2 ENCOUNTER TO DISCUSS TEST RESULTS: ICD-10-CM

## 2023-06-12 PROCEDURE — 3075F PR MOST RECENT SYSTOLIC BLOOD PRESS GE 130-139MM HG: ICD-10-PCS | Mod: CPTII,S$GLB,, | Performed by: FAMILY MEDICINE

## 2023-06-12 PROCEDURE — 1101F PR PT FALLS ASSESS DOC 0-1 FALLS W/OUT INJ PAST YR: ICD-10-PCS | Mod: CPTII,S$GLB,, | Performed by: FAMILY MEDICINE

## 2023-06-12 PROCEDURE — 3008F BODY MASS INDEX DOCD: CPT | Mod: CPTII,S$GLB,, | Performed by: FAMILY MEDICINE

## 2023-06-12 PROCEDURE — 3044F PR MOST RECENT HEMOGLOBIN A1C LEVEL <7.0%: ICD-10-PCS | Mod: CPTII,S$GLB,, | Performed by: FAMILY MEDICINE

## 2023-06-12 PROCEDURE — 3288F PR FALLS RISK ASSESSMENT DOCUMENTED: ICD-10-PCS | Mod: CPTII,S$GLB,, | Performed by: FAMILY MEDICINE

## 2023-06-12 PROCEDURE — 99999 PR PBB SHADOW E&M-EST. PATIENT-LVL III: CPT | Mod: PBBFAC,,, | Performed by: FAMILY MEDICINE

## 2023-06-12 PROCEDURE — 99213 OFFICE O/P EST LOW 20 MIN: CPT | Mod: S$GLB,,, | Performed by: FAMILY MEDICINE

## 2023-06-12 PROCEDURE — 3288F FALL RISK ASSESSMENT DOCD: CPT | Mod: CPTII,S$GLB,, | Performed by: FAMILY MEDICINE

## 2023-06-12 PROCEDURE — 1159F PR MEDICATION LIST DOCUMENTED IN MEDICAL RECORD: ICD-10-PCS | Mod: CPTII,S$GLB,, | Performed by: FAMILY MEDICINE

## 2023-06-12 PROCEDURE — 3079F PR MOST RECENT DIASTOLIC BLOOD PRESSURE 80-89 MM HG: ICD-10-PCS | Mod: CPTII,S$GLB,, | Performed by: FAMILY MEDICINE

## 2023-06-12 PROCEDURE — 1159F MED LIST DOCD IN RCRD: CPT | Mod: CPTII,S$GLB,, | Performed by: FAMILY MEDICINE

## 2023-06-12 PROCEDURE — 1126F AMNT PAIN NOTED NONE PRSNT: CPT | Mod: CPTII,S$GLB,, | Performed by: FAMILY MEDICINE

## 2023-06-12 PROCEDURE — 3008F PR BODY MASS INDEX (BMI) DOCUMENTED: ICD-10-PCS | Mod: CPTII,S$GLB,, | Performed by: FAMILY MEDICINE

## 2023-06-12 PROCEDURE — 1126F PR PAIN SEVERITY QUANTIFIED, NO PAIN PRESENT: ICD-10-PCS | Mod: CPTII,S$GLB,, | Performed by: FAMILY MEDICINE

## 2023-06-12 PROCEDURE — 1101F PT FALLS ASSESS-DOCD LE1/YR: CPT | Mod: CPTII,S$GLB,, | Performed by: FAMILY MEDICINE

## 2023-06-12 PROCEDURE — 99999 PR PBB SHADOW E&M-EST. PATIENT-LVL III: ICD-10-PCS | Mod: PBBFAC,,, | Performed by: FAMILY MEDICINE

## 2023-06-12 PROCEDURE — 99213 PR OFFICE/OUTPT VISIT, EST, LEVL III, 20-29 MIN: ICD-10-PCS | Mod: S$GLB,,, | Performed by: FAMILY MEDICINE

## 2023-06-12 PROCEDURE — 3044F HG A1C LEVEL LT 7.0%: CPT | Mod: CPTII,S$GLB,, | Performed by: FAMILY MEDICINE

## 2023-06-12 PROCEDURE — 3075F SYST BP GE 130 - 139MM HG: CPT | Mod: CPTII,S$GLB,, | Performed by: FAMILY MEDICINE

## 2023-06-12 PROCEDURE — 3079F DIAST BP 80-89 MM HG: CPT | Mod: CPTII,S$GLB,, | Performed by: FAMILY MEDICINE

## 2023-06-13 NOTE — PROGRESS NOTES
Ambar Duenas  72 y.o. Black or  female    Here for follow up on hypertension and lab results.  Blood pressure usually well controlled. Pt is a counselor/therapist    Labs were from May 2023  CBC showed mild anemia pt appears to be stable  She is not having symptoms  Discussed diet changes to help    CMP within normal limits   Kidney function and liver function are great    Patient inquired about CEA and this was also normal     Reports taking medications as instructed.  Not taking any OTC meds.    Past Medical History:   Diagnosis Date    Anemia, unspecified     Fibroids     GERD (gastroesophageal reflux disease)     Hyperlipidemia     Hypertension          Current Outpatient Medications:     amLODIPine (NORVASC) 5 MG tablet, Take 1 tablet (5 mg total) by mouth once daily., Disp: 90 tablet, Rfl: 1    cyclobenzaprine (FLEXERIL) 5 MG tablet, Take 1 tablet (5 mg total) by mouth nightly as needed for Muscle spasms., Disp: 15 tablet, Rfl: 0    ibuprofen (ADVIL,MOTRIN) 800 MG tablet, Take 1 tablet (800 mg total) by mouth every 6 (six) hours as needed., Disp: 30 tablet, Rfl: 1    montelukast (SINGULAIR) 10 mg tablet, Take 1 tablet (10 mg total) by mouth every evening., Disp: 90 tablet, Rfl: 1    pantoprazole (PROTONIX) 40 MG tablet, Take 1 tablet (40 mg total) by mouth 2 (two) times daily., Disp: 180 tablet, Rfl: 1    Review of patient's allergies indicates:  No Known Allergies    ROS: Denies dizziness, headache, chest pain, shortness of breath or edema    PE:  GEN: Alert and oriented, no acute distress  HEART: Normal S1 and S2, RRR, no lower extremity edema  LUNGS: Respirations unlabored, bilaterally clear to auscultation    ASSESSMENT/PLAN:   Essential hypertension    Severe obesity (BMI 35.0-35.9 with comorbidity)      Blood pressure controlled  Continue current medication regimen. Suggest improving diet and discussed DASH diet today. The DASH diet incorporates eating vegetables, fruits, and whole  grains Including fat-free or low-fat dairy products, fish, poultry, beans, nuts, and vegetable oils. Limiting foods that are high in saturated fat, such as fatty meats, full-fat dairy products, and tropical oils such as coconut oil. Limiting sugar-sweetened beverages and sweets. Target BP is less than 140/90 if BP running higher than this please schedule an appointment to be seen. You may consider getting BP monitor at home or visiting local pharmacy to check BP at least 1 to 2 times a week.     Test results reviewed     Follow up as needed

## 2023-06-28 ENCOUNTER — PATIENT OUTREACH (OUTPATIENT)
Dept: ADMINISTRATIVE | Facility: HOSPITAL | Age: 73
End: 2023-06-28
Payer: MEDICARE

## 2023-06-28 ENCOUNTER — ANESTHESIA (OUTPATIENT)
Dept: ENDOSCOPY | Facility: HOSPITAL | Age: 73
End: 2023-06-28
Payer: MEDICARE

## 2023-06-28 ENCOUNTER — HOSPITAL ENCOUNTER (OUTPATIENT)
Facility: HOSPITAL | Age: 73
Discharge: HOME | End: 2023-06-28
Attending: COLON & RECTAL SURGERY | Admitting: COLON & RECTAL SURGERY
Payer: MEDICARE

## 2023-06-28 ENCOUNTER — ANESTHESIA EVENT (OUTPATIENT)
Dept: ENDOSCOPY | Facility: HOSPITAL | Age: 73
End: 2023-06-28
Payer: MEDICARE

## 2023-06-28 DIAGNOSIS — Z12.11 SCREENING FOR COLON CANCER: ICD-10-CM

## 2023-06-28 PROCEDURE — 88305 TISSUE EXAM BY PATHOLOGIST: CPT | Mod: 26,,, | Performed by: STUDENT IN AN ORGANIZED HEALTH CARE EDUCATION/TRAINING PROGRAM

## 2023-06-28 PROCEDURE — 45380 COLONOSCOPY AND BIOPSY: CPT | Mod: PT,59 | Performed by: COLON & RECTAL SURGERY

## 2023-06-28 PROCEDURE — 37000009 HC ANESTHESIA EA ADD 15 MINS: Performed by: COLON & RECTAL SURGERY

## 2023-06-28 PROCEDURE — 25000003 PHARM REV CODE 250: Performed by: NURSE ANESTHETIST, CERTIFIED REGISTERED

## 2023-06-28 PROCEDURE — 45380 PR COLONOSCOPY,BIOPSY: ICD-10-PCS | Mod: PT,59,, | Performed by: COLON & RECTAL SURGERY

## 2023-06-28 PROCEDURE — 27201089 HC SNARE, DISP (ANY): Performed by: COLON & RECTAL SURGERY

## 2023-06-28 PROCEDURE — 88305 TISSUE EXAM BY PATHOLOGIST: CPT | Performed by: STUDENT IN AN ORGANIZED HEALTH CARE EDUCATION/TRAINING PROGRAM

## 2023-06-28 PROCEDURE — 45385 PR COLONOSCOPY,REMV LESN,SNARE: ICD-10-PCS | Mod: PT,,, | Performed by: COLON & RECTAL SURGERY

## 2023-06-28 PROCEDURE — 45380 COLONOSCOPY AND BIOPSY: CPT | Mod: PT,59,, | Performed by: COLON & RECTAL SURGERY

## 2023-06-28 PROCEDURE — 63600175 PHARM REV CODE 636 W HCPCS: Performed by: NURSE ANESTHETIST, CERTIFIED REGISTERED

## 2023-06-28 PROCEDURE — 88305 TISSUE EXAM BY PATHOLOGIST: ICD-10-PCS | Mod: 26,,, | Performed by: STUDENT IN AN ORGANIZED HEALTH CARE EDUCATION/TRAINING PROGRAM

## 2023-06-28 PROCEDURE — 37000008 HC ANESTHESIA 1ST 15 MINUTES: Performed by: COLON & RECTAL SURGERY

## 2023-06-28 PROCEDURE — 45385 COLONOSCOPY W/LESION REMOVAL: CPT | Mod: PT | Performed by: COLON & RECTAL SURGERY

## 2023-06-28 PROCEDURE — 27201012 HC FORCEPS, HOT/COLD, DISP: Performed by: COLON & RECTAL SURGERY

## 2023-06-28 PROCEDURE — 45385 COLONOSCOPY W/LESION REMOVAL: CPT | Mod: PT,,, | Performed by: COLON & RECTAL SURGERY

## 2023-06-28 RX ORDER — PROPOFOL 10 MG/ML
VIAL (ML) INTRAVENOUS
Status: DISCONTINUED | OUTPATIENT
Start: 2023-06-28 | End: 2023-06-28

## 2023-06-28 RX ORDER — LIDOCAINE HYDROCHLORIDE 10 MG/ML
INJECTION, SOLUTION EPIDURAL; INFILTRATION; INTRACAUDAL; PERINEURAL
Status: DISCONTINUED | OUTPATIENT
Start: 2023-06-28 | End: 2023-06-28

## 2023-06-28 RX ADMIN — LIDOCAINE HYDROCHLORIDE 50 MG: 10 SOLUTION INTRAVENOUS at 09:06

## 2023-06-28 RX ADMIN — PROPOFOL 50 MG: 10 INJECTION, EMULSION INTRAVENOUS at 09:06

## 2023-06-28 RX ADMIN — PROPOFOL 50 MG: 10 INJECTION, EMULSION INTRAVENOUS at 10:06

## 2023-06-28 RX ADMIN — SODIUM CHLORIDE, SODIUM LACTATE, POTASSIUM CHLORIDE, AND CALCIUM CHLORIDE: .6; .31; .03; .02 INJECTION, SOLUTION INTRAVENOUS at 09:06

## 2023-06-28 RX ADMIN — PROPOFOL 100 MG: 10 INJECTION, EMULSION INTRAVENOUS at 09:06

## 2023-06-28 NOTE — PROGRESS NOTES
Attempted to contact patient by phone for Mammogram visit, was unable to leave voice mail message. Updated Care Everywhere and Team.

## 2023-06-28 NOTE — LETTER
Ambar T Henrique   [address] [address]  [facility] [address]        [facility] ENDOSCOPY PATIENT INSTRUCTIONS  You are scheduled for a/an Colonoscopy (Procedure), on Wednesday(Day), 06/28/2023 (Date).       You will be contacted prior to the day of the procedure with your arrival time.                  Your procedure will be performed at [facility] ([facility]). The hospital is located at [address], off [address], [address] ([place]). Once on [address], [facility] is the second building (Entrance #2) on the left. Check in for your procedure on the 1st floor. ([phone])      ALL PATIENTS:   ? Please plan to be at the hospital for 3 - 4 hours.   ? Use of Anesthesia requires you to have a responsible person to drive you to the hospital, stay while the procedure is being performed, assume responsibility for your care at discharge, and drive you home. You should not operate a vehicle, machinery or sign any legal documents until the next day. YOU MUST HAVE A RESPONSIBLE PERSON TO DRIVE YOU HOME.  ? Leave all valuables at home, including jewelry. (Piercings must be removed) You will need to bring your 's license, medical insurance card, and a method of payment. You will be responsible for any co-payment at time of registration - Please reach out to Financial Services if you have any questions or concerns.   ? If biopsies need to be performed or a polyp needs to be removed, this may result in a change in the billing of your procedure and could impact your payment responsibility depending on your insurance provider.   ? Wear clothing appropriate for easy re-dressing after sedation.   ? Please bring a complete list of all medications you are taking.     MEDICATIONS:  ? BLOOD PRESSURE, HEART, SEIZURE, LUNG, or PSYCHIATRIC MEDICATIONS you normally take in the morning, please take them the morning of your procedure. This includes Inhalers.   o Please take these medications one  "hour prior to your arrival time with a small sip of water        [facility] SUPREP INSTRUCTIONS  Please use this page as a checklist for your preparation.    [] ITEMS TO PURCHASE BEFORE YOUR PROCEDURE:  Please purchase the following items from your local pharmacy prior to your appointment.    4 Dulcolax (bisacodyl 5mg) laxative tablets- (4 tables equals 20 mg), no prescription needed; over-the-counter medication.   Gas X (Simethicone) 125 mg capsules - no prescription needed; this is an over-the-counter medication.    Dramamine (Meclizine) 25 mg tablet (for nausea)- no prescription needed; this is an over-the-counter medication.   Suprep - A prescription is required and has been sent to your pharmacy - Please do not follow the 's instructions that may be in the box - your prep will not be adequate. Please do follow the instructions listed below.    [] FIVE DAYS BEFORE YOUR PROCEDURE: Begin low fiber diet- see attached instructions.    [] THE DAY BEFORE YOUR PROCEDURE :(WHEN YOU WAKE)  begin clear liquids only - no solid foods may be eaten until after your procedure has been performed/completed.  Please drink 1 - 2 gallons of clear liquids throughout the day.   You may consume the following items:   o Coffee, water, or tea. (We agree it's odd, but coffee and tea without milk or creamer is considered a clear liquid)   o Clear carbonated beverages (soft drinks), ginger ale, sprite, sparkling water, etc. No "Energy" beverages.   o Gelatin dessert, (JELLO) plain or fruit flavored. No red or purple coloring/No solid pieces of fruit.  o Apple juice, white grape juice, or white cranberry juice. No pulp, no orange juice.   o Gatorade, Powerade, lemonade, or limeade. No red or purple.   o Clear, fat-free, beef or chicken broths, or bouillon.   o Snowballs, popsicles, slushes. No red or purple coloring, no pulp.   o Avoid any liquids not listed above.     [] 12:00 PM (the day before " procedure):   Take 4 Dulcolax (bisacodyl) tablets with 8 ounces of clear liquid   Take 1 simethicone (Gas-X) 125 mg capsule with 8 ounces of clear liquid    [] 5:00 PM (the day before procedure), for prevention of nausea and vomiting:   Take ½ of a tablet (12.5 mg) of Dramamine (Meclizine) every 6 hours, with a clear liquid, as needed for nausea and/or vomiting. DO NOT TAKE MORE THAN 50 MG IN A 24 HOUR PERIOD.    [] 6:00 PM (the day before procedure) Begin the first portion of the prep.  (Suprep may taste better if refrigerated.)    You may use Crystal Light to flavor the prep solution and water - avoid Crystal Light that contains purple or red coloring.) Please follow these instructions (do not follow the instructions on the Suprep box as they are different from Prep Instructions for your procedure)   1. Pour one 6 oz. bottle of prep solution into the mixing container.   2. Add cool water to the mixing container to reach the 16-ounce fill line.  Mix well.   3. Drink ALL the contents in the mixing container.  4. Drink 2 additional 16 oz. containers of water.  Please have steps 3 and 4 consumed within 1 hour and 30 minutes.   Clear liquids may be continued until you finish the second portion of the prep. This will help you remain hydrated.      []  ____ AM (THE MORNING OF YOUR PROCEDURE): Complete the second portion of your prep. You will be notified the day before of this time.  1. Pour one 6 oz. bottle of prep solution into the mixing container.   2. Add cool water to the mixing container to reach the 16-ounce fill line.  Mix well.   3. Drink ALL the contents in the mixing container.  4. Drink 2 additional 16 oz. containers of water.  Please have steps 3 and 4 consumed within 1 hour and 30 minutes.   After you complete the bowel prep and the required water, you may not have anything else by mouth except for your morning medications as listed prior, with a small sip of water.      Please follow these instructions  to ensure you have a very good prep. The goal is for stool to be CLEAR OR YELLOW liquid - NO BROWN.  Avoid having to repeat the procedure due to a poor prep! This includes no gum, mints, or tobacco products.     Please call [phone] or [phone] if you continue to have brown stool or have any questions about your prep instructions. Remember, due to Anesthesia, you MUST have a responsible person to drive you home!        LOW FIBER DIET  FIVE (5) DAYS BEFORE YOUR PROCEDURE- Please start a Low Fiber Diet as below:  A good preparation (clean out) of the colon is necessary prior to having a colonoscopy in order to ensure that all areas of the colon can be seen without difficulty. It is helpful to avoid high fiber foods prior to having a colonoscopy as high fiber foods (especially seeds and nuts) are more difficult to completely clear out of the colon. We recommend avoiding high fiber foods for at least 4 days prior to your colonoscopy. If you have issues with constipation you may want to start avoiding high fiber foods 7 days before the procedure.    Foods to Include In Your Diet:       Grain Products:    Enriched refined white bread, buns, bagels, English muffins   Plain cereals e.g., Cheerios, Cornflakes, Cream of Wheat, Rice Krispies, Special K   Tea biscuits, arrowroot cookies, soda crackers, nasim crackers, plain Darlene toast and flour tortillas  White rice, refined pasta and noodles   Fruits:   peel fruits when possible  Fruit juices except prune juice   Soft fruits: apricots, banana (1/2), cantaloupe, canned fruit cocktail, grapes,   honeydew melon, peaches, watermelon, citrus fruits, plums, pineapple   Sauces: Apple or apricot        Vegetables:    Vegetable juices    Tomato sauces    Potatoes (no skin)    Well-cooked and tender vegetables including alfalfa sprouts, spinach, beets, carrots, celery, cucumber, eggplant, lettuce, mushrooms, green/red peppers, squash, zucchini, onions, brussel sprouts,  asparagus     Meat and Protein Choice:  Well-cooked, tender meat, fish and eggs            Foods to avoid:   ? Whole grain breads and pastas, corn bread or muffins, products made with whole grain products, bran, seeds, or nuts   ? Strong cheeses, yogurt containing fruit skins or seeds   ? Raw vegetables and pickles  ? All types of whole kernel corn   ? All beans, peas, and legumes   ? Fruit peels and hard fruits   ? Tough meat, meat with gristle   ? Crunchy peanut butter   ? Nuts, seeds and popcorn   ? Millet, buckwheat, flax, oatmeal, onseimo seeds   ? Dried fruits, berries, other fruits with pulp or seeds (including blueberries, raspberries, and blackberries)   ? Food containing chocolate, coconut   ? Juices with pulp     Avoiding the recommended foods is one part of helping to ensure that you have adequate preparation and that you do not need to have a repeat colonoscopy any sooner than what would be recommended by the colon cancer screening guidelines.    Please follow these instructions to ensure you have a very good prep. The goal is for stool to be CLEAR OR YELLOW liquid - NO BROWN.  Avoid having to repeat the procedure due to a poor prep!   Please call [phone] or [phone] if you continue to have brown stool or have any questions about your prep instructions.     Questions regarding scheduling: Endoscopy Scheduling [phone] (M-F, 7:30am-4:30pm)   Questions about Insurance/ Financial obligations: Financial Services [phone]   Questions requiring immediate assistance: [facility] [phone] (After Hours)

## 2023-06-28 NOTE — BRIEF OP NOTE
[facility])  Brief Operative Note     SUMMARY     Surgery Date: 6/28/2023     Surgeon(s) and Role:     * Ryann Liu MD - Primary    Assisting Surgeon: None    Pre-op Diagnosis:  Personal history of colon cancer [Z85.038]    Post-op Diagnosis:  Post-Op Diagnosis Codes:     * Personal history of colon cancer [Z85.038]    Procedure(s) (LRB):  COLONOSCOPY (N/A)    Anesthesia: Choice    Description of the findings of the procedure: anastomosis; one polyp removed    Estimated Blood Loss: * No values recorded between 6/28/2023  9:48 AM and 6/28/2023 10:07 AM *         Specimens:   Specimen (24h ago, onward)       Start     Ordered    06/28/23 0959  Specimen to Pathology, Surgery Gastrointestinal tract  Once        Comments: Pre-op Diagnosis: Personal history of colon cancer [Z85.038]Procedure(s):COLONOSCOPY 1. Anastomosis Bx 2. Anastomotic Polypectomy     References:    Click here for ordering Quick Tip   Question Answer Comment   Procedure Type: Gastrointestinal tract    Which provider would you like to cc? RYANN LIU    Release to patient Immediate        06/28/23 1005                    Discharge Note    SUMMARY     Admit Date: 6/28/2023    Discharge Date and Time: 6/28/2023 10:10 AM    Hospital Course Patient was seen in the preoperative area by both myself and anesthesia. All consents were verified and all questions appropriately answered. All risks, benefits and alternatives explained to patient. Patient proceeded to endoscopy suite for colonoscopy and was discharged home postoperative once cleared by anesthesia.    Final Diagnosis: Post-Op Diagnosis Codes:     * Personal history of colon cancer [Z85.038]    Disposition: Home or Self Care    Follow Up/Patient Instructions: See Provation report    Medications:  Reconciled Home Medications:      Medication List        CONTINUE taking these medications      amLODIPine 5 MG tablet  Commonly known as: NORVASC  Take 1 tablet (5 mg total)  by mouth once daily.     cyclobenzaprine 5 MG tablet  Commonly known as: FLEXERIL  Take 1 tablet (5 mg total) by mouth nightly as needed for Muscle spasms.     ibuprofen 800 MG tablet  Commonly known as: ADVIL,MOTRIN  Take 1 tablet (800 mg total) by mouth every 6 (six) hours as needed.     montelukast 10 mg tablet  Commonly known as: SINGULAIR  Take 1 tablet (10 mg total) by mouth every evening.     pantoprazole 40 MG tablet  Commonly known as: PROTONIX  Take 1 tablet (40 mg total) by mouth 2 (two) times daily.            Discharge Procedure Orders   Diet general     Call MD for:  temperature >100.4     Call MD for:  persistent nausea and vomiting     Call MD for:  severe uncontrolled pain     Call MD for:  difficulty breathing, headache or visual disturbances     Call MD for:  redness, tenderness, or signs of infection (pain, swelling, redness, odor or green/yellow discharge around incision site)     Call MD for:  hives     Call MD for:  persistent dizziness or light-headedness     Call MD for:  extreme fatigue     Activity as tolerated      Follow-up Information       Gin Vitale MD Follow up.    Specialty: Internal Medicine  Why: As needed  Contact information:  [address] [place] [address] [address] [place]I [id number] [phone]

## 2023-06-28 NOTE — ANESTHESIA POSTPROCEDURE EVALUATION
Anesthesia Post Evaluation    Patient: Ambar Duenas    Procedure(s) Performed: Procedure(s) (LRB):  COLONOSCOPY (N/A)    Final Anesthesia Type: MAC      Patient location during evaluation: GI PACU  Patient participation: Yes- Able to Participate  Level of consciousness: awake and alert  Post-procedure vital signs: reviewed and stable  Pain management: adequate  Airway patency: patent    PONV status at discharge: No PONV  Anesthetic complications: no      Cardiovascular status: blood pressure returned to baseline  Respiratory status: unassisted and spontaneous ventilation  Hydration status: euvolemic  Follow-up not needed.          Vitals Value Taken Time   /69 06/28/23 1024   Temp 37.2 °C (99 °F) 06/28/23 1008   Pulse 76 06/28/23 1024   Resp 20 06/28/23 1024   SpO2 97 % 06/28/23 1024         Event Time   Out of Recovery 10:30:25         Pain/Terrence Score: Terrence Score: 9 (6/28/2023 10:24 AM)

## 2023-06-28 NOTE — ANESTHESIA PREPROCEDURE EVALUATION
06/28/2023  Ambar Duenas is a 72 y.o., female.      Pre-op Assessment    I have reviewed the Patient Summary Reports.     I have reviewed the Nursing Notes.       Review of Systems  Anesthesia Hx:  No problems with previous Anesthesia    EENT/Dental:EENT/Dental Normal   Cardiovascular:   Exercise tolerance: good Hypertension    Pulmonary:  Pulmonary Normal    Hepatic/GI:   GERD    Neurological:  Neurology Normal        Physical Exam  General: Well nourished    Airway:  Mallampati: II   Mouth Opening: Normal  TM Distance: Normal  Neck ROM: Normal ROM    Dental:  Intact        Anesthesia Plan  Type of Anesthesia, risks & benefits discussed:      Intra-op Monitoring Plan: Standard ASA Monitors  Post Op Pain Control Plan: IV/PO Opioids PRN  Induction:  IV  Informed Consent: Informed consent signed with the Patient and all parties understand the risks and agree with anesthesia plan.  All questions answered. Patient consented to blood products? Yes  ASA Score: 2  Day of Surgery Review of History & Physical: H&P Update referred to the surgeon/provider.I have interviewed and examined the patient. I have reviewed the patient's H&P dated: There are no significant changes.     Ready For Surgery From Anesthesia Perspective.     .                                                                                                                 06/28/2023  Ambar Duenas is a 72 y.o., female.      Pre-op Assessment    I have reviewed the Patient Summary Reports.    I have reviewed the NPO Status.   I have reviewed the Medications.     Review of Systems  Hematology/Oncology:         -- Anemia: Oncology Comments: Sigmoid CA    Cardiovascular:   Hypertension hyperlipidemia ECG has been reviewed. Normal sinus rhythm   Moderate voltage criteria for LVH, may be normal variant   Borderline Abnormal ECG   No previous ECGs  available   Confirmed by MD TAMIR, PETR ([id number]) on 4/25/2022 9:35:29 PM     Referred By: RYANN WESTFALL           Confirmed By:PETR GARNETT MD   Hepatic/GI:   GERD, well controlled             Anesthesia Plan  Type of Anesthesia, risks & benefits discussed:    Anesthesia Type: Gen ETT  Intra-op Monitoring Plan: Standard ASA Monitors  Post Op Pain Control Plan: IV/PO Opioids PRN  Induction:  IV  Airway Plan: Direct  Informed Consent: Informed consent signed with the Patient and all parties understand the risks and agree with anesthesia plan.  All questions answered.   ASA Score: 3    Ready For Surgery From Anesthesia Perspective.     Past Medical History:   Diagnosis Date    Anemia, unspecified     Fibroids     GERD (gastroesophageal reflux disease)     Hyperlipidemia     Hypertension        Past Surgical History:   Procedure Laterality Date    COLONOSCOPY N/A 3/28/2022    Procedure: COLONOSCOPY;  Surgeon: Melanie Ballard MD;  Location: [facility];  Service: Endoscopy;  Laterality: N/A;    ESOPHAGOGASTRODUODENOSCOPY N/A 3/28/2022    Procedure: ESOPHAGOGASTRODUODENOSCOPY (EGD);  Surgeon: Melanie Ballard MD;  Location: [facility];  Service: Endoscopy;  Laterality: N/A;    HEMORRHOID SURGERY      HYSTEROSCOPY WITH DILATION AND CURETTAGE OF UTERUS N/A 5/10/2022    Procedure: HYSTEROSCOPY, WITH DILATION AND CURETTAGE OF UTERUS;  Surgeon: Madelyn West MD;  Location: [facility];  Service: OB/GYN;  Laterality: N/A;    INJECTION OF ANESTHETIC AGENT INTO TISSUE PLANE DEFINED BY TRANSVERSUS ABDOMINIS MUSCLE N/A 5/10/2022    Procedure: BLOCK, TRANSVERSUS ABDOMINIS PLANE;  Surgeon: Ryann Westfall MD;  Location: [facility];  Service: General;  Laterality: N/A;    LIVER BIOPSY  5/10/2022    Procedure: BIOPSY, LIVER;  Surgeon: Ryann Westfall MD;  Location: [facility] OR;  Service: General;;    OMENTECTOMY  5/10/2022    Procedure: OMENTECTOMY;  Surgeon: Ryann Westfall MD;  Location: [facility];  Service: General;;   partial    PARTIAL GASTRECTOMY N/A 5/10/2022    Procedure: GASTRECTOMY, PARTIAL;  Surgeon: Maury Lui MD;  Location: [facility] OR;  Service: General;  Laterality: N/A;    SUBTOTAL COLECTOMY Left 5/10/2022    Procedure: COLECTOMY, PARTIAL;  Surgeon: Maury Liu MD;  Location: [facility] OR;  Service: General;  Laterality: Left;  LEFT VS SUBTOTAl vs EXTENDED RIGHT       Family History   Problem Relation Age of Onset    No Known Problems Mother     No Known Problems Father        Social History     Socioeconomic History    Marital status:    Tobacco Use    Smoking status: Never     Passive exposure: Never    Smokeless tobacco: Never   Substance and Sexual Activity    Alcohol use: Yes     Alcohol/week: 2.0 standard drinks     Types: 2 Glasses of wine per week     Comment: occ    Drug use: Never    Sexual activity: Not Currently     Partners: Male     Birth control/protection: Post-menopausal       No current outpatient medications on file.     No current facility-administered medications for this visit.     Lab Results   Component Value Date    WBC 5.18 05/16/2023    HGB 11.7 (L) 05/16/2023    HCT 38.2 05/16/2023    MCV 89 05/16/2023     05/16/2023           Review of patient's allergies indicates:  No Known Allergies    .

## 2023-06-28 NOTE — PROVATION PATIENT INSTRUCTIONS
Discharge Summary/Instructions after an Endoscopic Procedure  Patient Name: Ambar Duenas  Patient MRN: [id number]  Patient YOB: 1950 Wednesday, June 28, 2023 Maury Liu MD  Dear patient,  As a result of recent federal legislation (The Federal Cures Act), you may   receive lab or pathology results from your procedure in your [facility]   account before your physician is able to contact you. Your physician or   their representative will relay the results to you with their   recommendations at their soonest availability.  Thank you,  RESTRICTIONS:  During your procedure today, you received medications for sedation.  These   medications may affect your judgment, balance and coordination.  Therefore,   for 24 hours, you have the following restrictions:   - DO NOT drive a car, operate machinery, make legal/financial decisions,   sign important papers or drink alcohol.    ACTIVITY:  Today: no heavy lifting, straining or running due to procedural   sedation/anesthesia.  The following day: return to full activity including work.  DIET:  Eat and drink normally unless instructed otherwise.     TREATMENT FOR COMMON SIDE EFFECTS:  - Mild abdominal pain, nausea, belching, bloating or excessive gas:  rest,   eat lightly and use a heating pad.  - Sore Throat: treat with throat lozenges and/or gargle with warm salt   water.  - Because air was used during the procedure, expelling large amounts of air   from your rectum or belching is normal.  - If a bowel prep was taken, you may not have a bowel movement for 1-3 days.    This is normal.  SYMPTOMS TO WATCH FOR AND REPORT TO YOUR PHYSICIAN:  1. Abdominal pain or bloating, other than gas cramps.  2. Chest pain.  3. Back pain.  4. Signs of infection such as: chills or fever occurring within 24 hours   after the procedure.  5. Rectal bleeding, which would show as bright red, maroon, or black stools.   (A tablespoon of blood from the rectum is not serious, especially  if   hemorrhoids are present.)  6. Vomiting.  7. Weakness or dizziness.  GO DIRECTLY TO THE NEAREST EMERGENCY ROOM IF YOU HAVE ANY OF THE FOLLOWING:      Difficulty breathing              Chills and/or fever over 101 F   Persistent vomiting and/or vomiting blood   Severe abdominal pain   Severe chest pain   Black, tarry stools   Bleeding- more than one tablespoon   Any other symptom or condition that you feel may need urgent attention  Your doctor recommends these additional instructions:  If any biopsies were taken, your doctors clinic will contact you in 1 to 2   weeks with any results.  - Discharge patient to home.   - High fiber diet.   - Continue present medications.   - Await pathology results.   - Repeat colonoscopy in 3 years for surveillance.   - Return to primary care physician PRN.  For questions, problems or results please call your physician Maury Liu MD at Work:  [phone]  If you have any questions about the above instructions, call the GI   department at [phone] or call the endoscopy unit at [phone]   from 7am until 3 pm.  [facility], EMERGENCY ROOM PHONE NUMBER:   [phone]  IF A COMPLICATION OR EMERGENCY SITUATION ARISES AND YOU ARE UNABLE TO REACH   YOUR PHYSICIAN - GO DIRECTLY TO THE EMERGENCY ROOM.  I have read or have had read to me these discharge instructions for my   procedure and have received a written copy.  I understand these   instructions and will follow-up with my physician if I have any questions.     __________________________________       _____________________________________  Nurse Signature                                          Patient/Designated   Responsible Party Signature  MD Maury Moody MD  6/28/2023 10:09:59 AM  This report has been verified and signed electronically.  Dear patient,  As a result of recent federal legislation (The Federal Cures Act), you may   receive lab or pathology  results from your procedure in your [facility]   account before your physician is able to contact you. Your physician or   their representative will relay the results to you with their   recommendations at their soonest availability.  Thank you,  PROVATION

## 2023-06-28 NOTE — H&P
[facility] ([facility])  Colon and Rectal Surgery  History & Physical    Patient Name: Ambar Duenas  MRN: [id number]  Admission Date: 6/28/2023  Attending Physician: Maury Liu MD  Primary Care Provider: Gin Vitale MD    Patient information was obtained from patient and medical records.    Subjective:     Chief Complaint/Reason for Admission: Here for Colonoscopy    History of Present Illness:  Patient is a 72 y.o. female presents for colonoscopy. Patient with colonic adenocarcinoma now s/p subtotal colectomy with partial gastrectomy on 5/10/22. No current hematochezia or melena.    No current facility-administered medications on file prior to encounter.     Current Outpatient Medications on File Prior to Encounter   Medication Sig    amLODIPine (NORVASC) 5 MG tablet Take 1 tablet (5 mg total) by mouth once daily.    cyclobenzaprine (FLEXERIL) 5 MG tablet Take 1 tablet (5 mg total) by mouth nightly as needed for Muscle spasms.    ibuprofen (ADVIL,MOTRIN) 800 MG tablet Take 1 tablet (800 mg total) by mouth every 6 (six) hours as needed.    montelukast (SINGULAIR) 10 mg tablet Take 1 tablet (10 mg total) by mouth every evening.    pantoprazole (PROTONIX) 40 MG tablet Take 1 tablet (40 mg total) by mouth 2 (two) times daily.       Review of patient's allergies indicates:  No Known Allergies    Past Medical History:   Diagnosis Date    Anemia, unspecified     Fibroids     GERD (gastroesophageal reflux disease)     Hyperlipidemia     Hypertension      Past Surgical History:   Procedure Laterality Date    COLONOSCOPY N/A 3/28/2022    Procedure: COLONOSCOPY;  Surgeon: Melanie Ballard MD;  Location: [facility];  Service: Endoscopy;  Laterality: N/A;    ESOPHAGOGASTRODUODENOSCOPY N/A 3/28/2022    Procedure: ESOPHAGOGASTRODUODENOSCOPY (EGD);  Surgeon: Melanie Ballard MD;  Location: [facility];  Service: Endoscopy;  Laterality: N/A;    HEMORRHOID SURGERY      HYSTEROSCOPY WITH DILATION AND CURETTAGE OF  UTERUS N/A 5/10/2022    Procedure: HYSTEROSCOPY, WITH DILATION AND CURETTAGE OF UTERUS;  Surgeon: Madelyn West MD;  Location: [facility] OR;  Service: OB/GYN;  Laterality: N/A;    INJECTION OF ANESTHETIC AGENT INTO TISSUE PLANE DEFINED BY TRANSVERSUS ABDOMINIS MUSCLE N/A 5/10/2022    Procedure: BLOCK, TRANSVERSUS ABDOMINIS PLANE;  Surgeon: Maury Liu MD;  Location: [facility] OR;  Service: General;  Laterality: N/A;    LIVER BIOPSY  5/10/2022    Procedure: BIOPSY, LIVER;  Surgeon: Maury Lui MD;  Location: [facility] OR;  Service: General;;    OMENTECTOMY  5/10/2022    Procedure: OMENTECTOMY;  Surgeon: Maury Liu MD;  Location: [facility] OR;  Service: General;;  partial    PARTIAL GASTRECTOMY N/A 5/10/2022    Procedure: GASTRECTOMY, PARTIAL;  Surgeon: Maury Liu MD;  Location: [facility] OR;  Service: General;  Laterality: N/A;    SUBTOTAL COLECTOMY Left 5/10/2022    Procedure: COLECTOMY, PARTIAL;  Surgeon: Maury Liu MD;  Location: [facility] OR;  Service: General;  Laterality: Left;  LEFT VS SUBTOTAl vs EXTENDED RIGHT     Family History       Problem Relation (Age of Onset)    No Known Problems Mother, Father          Tobacco Use    Smoking status: Never     Passive exposure: Never    Smokeless tobacco: Never   Substance and Sexual Activity    Alcohol use: Yes     Alcohol/week: 2.0 standard drinks     Types: 2 Glasses of wine per week     Comment: occ    Drug use: Never    Sexual activity: Not Currently     Partners: Male     Birth control/protection: Post-menopausal     Review of Systems   Constitutional:  Negative for activity change, appetite change, chills, fatigue, fever and unexpected weight change.   HENT:  Negative for congestion, ear pain, sore throat and trouble swallowing.    Eyes:  Negative for pain, redness and itching.   Respiratory:  Negative for cough, shortness of breath and wheezing.    Cardiovascular:  Negative for chest pain, palpitations and leg swelling.   Gastrointestinal:  Negative  "for abdominal distention, abdominal pain, anal bleeding, blood in stool, constipation, diarrhea, nausea, rectal pain and vomiting.   Endocrine: Negative for cold intolerance, heat intolerance and polyuria.   Genitourinary:  Negative for dysuria, flank pain, frequency and hematuria.   Musculoskeletal:  Negative for gait problem, joint swelling and neck pain.   Skin:  Negative for color change, rash and wound.   Allergic/Immunologic: Negative for environmental allergies and immunocompromised state.   Neurological:  Negative for dizziness, speech difficulty, weakness and numbness.   Psychiatric/Behavioral:  Negative for agitation, confusion and hallucinations.    Objective:     BP (!) 165/86 (BP Location: Left arm, Patient Position: Lying)   Pulse 91   Temp 99 °F (37.2 °C) (Temporal)   Resp 17   Ht 5' 10" (1.778 m)   Wt 86.2 kg (190 lb)   LMP 06/03/2019   SpO2 98%   Breastfeeding No   BMI 27.26 kg/m²       Physical Exam  Constitutional:       Appearance: She is well-developed.   HENT:      Head: Normocephalic and atraumatic.   Eyes:      Conjunctiva/sclera: Conjunctivae normal.   Neck:      Thyroid: No thyromegaly.   Cardiovascular:      Rate and Rhythm: Normal rate and regular rhythm.   Pulmonary:      Effort: Pulmonary effort is normal. No respiratory distress.   Abdominal:      General: There is no distension.      Palpations: Abdomen is soft. There is no mass.      Tenderness: There is no abdominal tenderness.   Musculoskeletal:         General: No tenderness. Normal range of motion.      Cervical back: Normal range of motion.   Skin:     General: Skin is warm and dry.      Capillary Refill: Capillary refill takes less than 2 seconds.      Findings: No rash.   Neurological:      Mental Status: She is alert and oriented to person, place, and time.         Assessment/Plan:     Patient is a 72 y.o. female who presents for colonoscopy     - Ok to proceed to endoscopy suite for colonoscopy  - Consent obtained. " All risks, benefits and alternatives fully explained to patient, including but not limited to bleeding, infection, perforation, and missed polyps. All questions appropriately answered to patient's satisfaction. Consent signed and placed on chart.    There are no hospital problems to display for this patient.    VTE Risk Mitigation (From admission, onward)      None            Maury Liu MD  Colon and Rectal Surgery  [facility] ([facility])

## 2023-06-28 NOTE — TRANSFER OF CARE
"Anesthesia Transfer of Care Note    Patient: Ambar Duenas    Procedure(s) Performed: Procedure(s) (LRB):  COLONOSCOPY (N/A)    Patient location: GI    Anesthesia Type: MAC    Transport from OR: Transported from OR on room air with adequate spontaneous ventilation    Post pain: adequate analgesia    Post assessment: no apparent anesthetic complications    Post vital signs: stable    Level of consciousness: sedated    Nausea/Vomiting: no nausea/vomiting    Complications: none    Transfer of care protocol was followed      Last vitals:   Visit Vitals  BP (!) 165/86 (BP Location: Left arm, Patient Position: Lying)   Pulse 91   Temp 37.2 °C (99 °F) (Temporal)   Resp 17   Ht 5' 10" (1.778 m)   Wt 86.2 kg (190 lb)   LMP 06/03/2019   SpO2 98%   Breastfeeding No   BMI 27.26 kg/m²     "

## 2023-06-29 VITALS
WEIGHT: 190 LBS | SYSTOLIC BLOOD PRESSURE: 114 MMHG | BODY MASS INDEX: 27.2 KG/M2 | OXYGEN SATURATION: 97 % | DIASTOLIC BLOOD PRESSURE: 69 MMHG | HEART RATE: 76 BPM | RESPIRATION RATE: 20 BRPM | TEMPERATURE: 99 F | HEIGHT: 70 IN

## 2023-06-30 LAB
FINAL PATHOLOGIC DIAGNOSIS: NORMAL
GROSS: NORMAL
Lab: NORMAL
MICROSCOPIC EXAM: NORMAL

## 2023-10-11 ENCOUNTER — OFFICE VISIT (OUTPATIENT)
Dept: SURGERY | Facility: CLINIC | Age: 73
End: 2023-10-11
Payer: MEDICARE

## 2023-10-11 ENCOUNTER — LAB VISIT (OUTPATIENT)
Dept: LAB | Facility: HOSPITAL | Age: 73
End: 2023-10-11
Attending: COLON & RECTAL SURGERY
Payer: MEDICARE

## 2023-10-11 VITALS
HEIGHT: 70 IN | BODY MASS INDEX: 27.26 KG/M2 | HEART RATE: 78 BPM | SYSTOLIC BLOOD PRESSURE: 175 MMHG | DIASTOLIC BLOOD PRESSURE: 91 MMHG

## 2023-10-11 DIAGNOSIS — C18.9 MALIGNANT NEOPLASM OF COLON, UNSPECIFIED PART OF COLON: ICD-10-CM

## 2023-10-11 DIAGNOSIS — Z85.038 PERSONAL HISTORY OF COLON CANCER: ICD-10-CM

## 2023-10-11 DIAGNOSIS — Z85.038 PERSONAL HISTORY OF COLON CANCER: Primary | ICD-10-CM

## 2023-10-11 PROCEDURE — 3077F SYST BP >= 140 MM HG: CPT | Mod: CPTII,S$GLB,, | Performed by: COLON & RECTAL SURGERY

## 2023-10-11 PROCEDURE — 3080F PR MOST RECENT DIASTOLIC BLOOD PRESSURE >= 90 MM HG: ICD-10-PCS | Mod: CPTII,S$GLB,, | Performed by: COLON & RECTAL SURGERY

## 2023-10-11 PROCEDURE — 3077F PR MOST RECENT SYSTOLIC BLOOD PRESSURE >= 140 MM HG: ICD-10-PCS | Mod: CPTII,S$GLB,, | Performed by: COLON & RECTAL SURGERY

## 2023-10-11 PROCEDURE — 3044F HG A1C LEVEL LT 7.0%: CPT | Mod: CPTII,S$GLB,, | Performed by: COLON & RECTAL SURGERY

## 2023-10-11 PROCEDURE — 1159F MED LIST DOCD IN RCRD: CPT | Mod: CPTII,S$GLB,, | Performed by: COLON & RECTAL SURGERY

## 2023-10-11 PROCEDURE — 82378 CARCINOEMBRYONIC ANTIGEN: CPT | Performed by: COLON & RECTAL SURGERY

## 2023-10-11 PROCEDURE — 3044F PR MOST RECENT HEMOGLOBIN A1C LEVEL <7.0%: ICD-10-PCS | Mod: CPTII,S$GLB,, | Performed by: COLON & RECTAL SURGERY

## 2023-10-11 PROCEDURE — 1159F PR MEDICATION LIST DOCUMENTED IN MEDICAL RECORD: ICD-10-PCS | Mod: CPTII,S$GLB,, | Performed by: COLON & RECTAL SURGERY

## 2023-10-11 PROCEDURE — 1126F PR PAIN SEVERITY QUANTIFIED, NO PAIN PRESENT: ICD-10-PCS | Mod: CPTII,S$GLB,, | Performed by: COLON & RECTAL SURGERY

## 2023-10-11 PROCEDURE — 99214 PR OFFICE/OUTPT VISIT, EST, LEVL IV, 30-39 MIN: ICD-10-PCS | Mod: S$GLB,,, | Performed by: COLON & RECTAL SURGERY

## 2023-10-11 PROCEDURE — 3008F PR BODY MASS INDEX (BMI) DOCUMENTED: ICD-10-PCS | Mod: CPTII,S$GLB,, | Performed by: COLON & RECTAL SURGERY

## 2023-10-11 PROCEDURE — 1126F AMNT PAIN NOTED NONE PRSNT: CPT | Mod: CPTII,S$GLB,, | Performed by: COLON & RECTAL SURGERY

## 2023-10-11 PROCEDURE — 99999 PR PBB SHADOW E&M-EST. PATIENT-LVL III: ICD-10-PCS | Mod: PBBFAC,,, | Performed by: COLON & RECTAL SURGERY

## 2023-10-11 PROCEDURE — 3080F DIAST BP >= 90 MM HG: CPT | Mod: CPTII,S$GLB,, | Performed by: COLON & RECTAL SURGERY

## 2023-10-11 PROCEDURE — 99999 PR PBB SHADOW E&M-EST. PATIENT-LVL III: CPT | Mod: PBBFAC,,, | Performed by: COLON & RECTAL SURGERY

## 2023-10-11 PROCEDURE — 3008F BODY MASS INDEX DOCD: CPT | Mod: CPTII,S$GLB,, | Performed by: COLON & RECTAL SURGERY

## 2023-10-11 PROCEDURE — 36415 COLL VENOUS BLD VENIPUNCTURE: CPT | Performed by: COLON & RECTAL SURGERY

## 2023-10-11 PROCEDURE — 99214 OFFICE O/P EST MOD 30 MIN: CPT | Mod: S$GLB,,, | Performed by: COLON & RECTAL SURGERY

## 2023-10-11 NOTE — PROGRESS NOTES
History & Physical    SUBJECTIVE:     No chief complaint on file.    Ref: Melanie Ballard MD    History of Present Illness:  Patient is a 73 y.o. female presents forEvaluation of colonic adenocarcinoma.  Patient was undergoing surveillance colonoscopy in 2022 where she was found to have a almost completely obstructing colon mass that was biopsied and resulted as adenocarcinoma.  This was initially thought to be at the sigmoid colon upon imaging with CT scan of the chest abdomen pelvis, no metastatic disease was found but she was found to have a large fungating mass near the splenic flexure with a normal appearing sigmoid.  She has been asymptomatic but before the colonoscopy in since that time.  She denies any fever, chills, nausea, vomiting, hematochezia, melena or abdominal pain.  She only reports a minor amount of reflux that has since resolved with the addition of pantoprazole.  Her only significant past surgical history is  section via lower midline incision.  She denies any family history of colorectal cancer or IBD.  She has had previous colonic polyps on a colonoscopy around 7 years ago.  She reports she is still having normal bowel movements.    5/10/2022: open subtotal colectomy, partial gastrectomy (final path: Stage II T4N0 adenocarcinoma    Interval history:  Since last clinic visit, the patient underwent colonoscopy without new or recurrent disease. No hematochezia or melena. No abdominal pain.     Review of patient's allergies indicates:  No Known Allergies    Current Outpatient Medications   Medication Sig Dispense Refill    amLODIPine (NORVASC) 5 MG tablet Take 1 tablet (5 mg total) by mouth once daily. 90 tablet 1    ibuprofen (ADVIL,MOTRIN) 800 MG tablet Take 1 tablet (800 mg total) by mouth every 6 (six) hours as needed. 30 tablet 1    montelukast (SINGULAIR) 10 mg tablet Take 1 tablet (10 mg total) by mouth every evening. 90 tablet 1    pantoprazole (PROTONIX) 40 MG tablet Take 1  tablet (40 mg total) by mouth 2 (two) times daily. 180 tablet 1     No current facility-administered medications for this visit.       Past Medical History:   Diagnosis Date    Anemia, unspecified     Fibroids     GERD (gastroesophageal reflux disease)     Hyperlipidemia     Hypertension      Past Surgical History:   Procedure Laterality Date    COLONOSCOPY N/A 3/28/2022    Procedure: COLONOSCOPY;  Surgeon: Melanie Ballard MD;  Location: [facility];  Service: Endoscopy;  Laterality: N/A;    COLONOSCOPY N/A 6/28/2023    Procedure: COLONOSCOPY;  Surgeon: Maury Liu MD;  Location: [facility];  Service: General;  Laterality: N/A;    ESOPHAGOGASTRODUODENOSCOPY N/A 3/28/2022    Procedure: ESOPHAGOGASTRODUODENOSCOPY (EGD);  Surgeon: Melanie Ballard MD;  Location: [facility];  Service: Endoscopy;  Laterality: N/A;    HEMORRHOID SURGERY      HYSTEROSCOPY WITH DILATION AND CURETTAGE OF UTERUS N/A 5/10/2022    Procedure: HYSTEROSCOPY, WITH DILATION AND CURETTAGE OF UTERUS;  Surgeon: Madelyn West MD;  Location: [facility];  Service: OB/GYN;  Laterality: N/A;    INJECTION OF ANESTHETIC AGENT INTO TISSUE PLANE DEFINED BY TRANSVERSUS ABDOMINIS MUSCLE N/A 5/10/2022    Procedure: BLOCK, TRANSVERSUS ABDOMINIS PLANE;  Surgeon: Maury Liu MD;  Location: [facility];  Service: General;  Laterality: N/A;    LIVER BIOPSY  5/10/2022    Procedure: BIOPSY, LIVER;  Surgeon: Maury Liu MD;  Location: [facility];  Service: General;;    OMENTECTOMY  5/10/2022    Procedure: OMENTECTOMY;  Surgeon: Maury Liu MD;  Location: [facility];  Service: General;;  partial    PARTIAL GASTRECTOMY N/A 5/10/2022    Procedure: GASTRECTOMY, PARTIAL;  Surgeon: Maury Liu MD;  Location: [facility] OR;  Service: General;  Laterality: N/A;    SUBTOTAL COLECTOMY Left 5/10/2022    Procedure: COLECTOMY, PARTIAL;  Surgeon: Maury Liu MD;  Location: [facility] OR;  Service: General;  Laterality: Left;  LEFT VS SUBTOTAl vs EXTENDED RIGHT  "    Family History   Problem Relation Age of Onset    No Known Problems Mother     No Known Problems Father      Social History     Tobacco Use    Smoking status: Never     Passive exposure: Never    Smokeless tobacco: Never   Substance Use Topics    Alcohol use: Yes     Alcohol/week: 2.0 standard drinks of alcohol     Types: 2 Glasses of wine per week     Comment: occ    Drug use: Never        Review of Systems:  Review of Systems   Constitutional:  Negative for activity change, appetite change, chills, fatigue, fever and unexpected weight change.   HENT:  Negative for congestion, ear pain, sore throat and trouble swallowing.    Eyes:  Negative for pain, redness and itching.   Respiratory:  Negative for cough, shortness of breath and wheezing.    Cardiovascular:  Negative for chest pain, palpitations and leg swelling.   Gastrointestinal:  Negative for abdominal distention, abdominal pain, anal bleeding, blood in stool, constipation, diarrhea, nausea, rectal pain and vomiting.   Endocrine: Negative for cold intolerance, heat intolerance and polyuria.   Genitourinary:  Negative for dysuria, flank pain, frequency and hematuria.   Musculoskeletal:  Negative for gait problem, joint swelling and neck pain.   Skin:  Negative for color change, rash and wound.   Allergic/Immunologic: Negative for environmental allergies and immunocompromised state.   Neurological:  Negative for dizziness, speech difficulty, weakness and numbness.   Psychiatric/Behavioral:  Negative for agitation, confusion and hallucinations.        OBJECTIVE:     Vital Signs (Most Recent)  Pulse: 78 (10/11/23 1454)  BP: (!) 175/91 (10/11/23 1454)  5' 10" (1.778 m)        Physical Exam:  Physical Exam  Constitutional:       Appearance: She is well-developed.   HENT:      Head: Normocephalic and atraumatic.   Eyes:      Conjunctiva/sclera: Conjunctivae normal.   Neck:      Thyroid: No thyromegaly.   Cardiovascular:      Rate and Rhythm: Normal rate and " regular rhythm.   Pulmonary:      Effort: Pulmonary effort is normal. No respiratory distress.   Abdominal:      Comments: Soft, nondistended, nontender; well-healed midline incision   Musculoskeletal:         General: No tenderness. Normal range of motion.      Cervical back: Normal range of motion.   Skin:     General: Skin is warm and dry.      Capillary Refill: Capillary refill takes less than 2 seconds.      Findings: No rash.   Neurological:      Mental Status: She is alert and oriented to person, place, and time.         Laboratory  Lab Results   Component Value Date    WBC 5.18 05/16/2023    HGB 11.7 (L) 05/16/2023    HCT 38.2 05/16/2023     05/16/2023    CHOL 263 (H) 05/16/2023    TRIG 65 05/16/2023    HDL 74 05/16/2023    ALT 15 05/16/2023    AST 22 05/16/2023     05/16/2023    K 4.0 05/16/2023     05/16/2023    CREATININE 0.7 05/16/2023    BUN 11 05/16/2023    CO2 26 05/16/2023    TSH 1.710 04/20/2022    HGBA1C 5.5 05/16/2023       Component Ref Range & Units 4 mo ago  (5/22/23) 8 mo ago  (2/13/23) 1 yr ago  (9/12/22) 1 yr ago  (4/20/22)   CEA 0.0 - 5.0 ng/mL 1.9  2.2 CM  2.1 CM  3.2 CM        Diagnostic Results:  CT: Reviewed  Colonoscopy: reviewed    CT April 2023:  FINDINGS: The lungs are clear.  No endobronchial or endotracheal lesions are evident.  No pathologically enlarged mediastinal, hilar, or axillary lymph nodes.  No aortic aneurysm or dissection.  There is no cardiomegaly or pericardial effusion.  Normal-appearing esophagus.     No focal liver abnormality with patent portal veins and hepatic veins.  The gallbladder and biliary ducts are normal in appearance.  The spleen, pancreas, and adrenal glands are unremarkable.  There are multiple punctate, nonobstructing stones within the bilateral kidneys with extrarenal pelves bilaterally.  No signs of ureteral stone or obstructive uropathy.  No enhancing renal masses.  The renal veins are patent.  The bladder is unremarkable.      There is a ventral hernia containing fat with a neck that measures 21 mm craniocaudal x 54 mm transverse spine just anterior to the gastric antrum.  Additional fat-containing ventral hernia more inferiorly with a neck measuring 12 x 11 mm.  No herniated bowel loops.     Prior surgery upon the stomach.  The stomach is otherwise not unusual in appearance.  Small bowel loops are normal in caliber.  Prior right hemicolectomy with surgical anastomosis involving the colon within the left upper quadrant.  The surgical anastomosis is normal in appearance.  No masslike abnormality near the junction.  There is stool throughout the remaining colon extending into the rectum.  No evidence of bowel obstruction or acute bowel inflammation.  No free fluid or free air.     No abdominal aortic aneurysm.  No pathologically enlarged lymph nodes within the abdomen or pelvis.     Calcified structure within the cervix may be postsurgical in nature along  's of a cerclage.  The uterus and ovaries are unremarkable.     No acute osseous findings.  Advanced degenerative change of the sacroiliac joints with joint space sclerosis and vacuum phenomenon.  Advanced facet arthropathy inferiorly with spondylolisthesis at L4-L5 and L5-S1.        Impression:     1.  No evidence of metastatic disease or recurrent malignant disease.  2.  Punctate nonobstructing kidney stones bilaterally.  3.  There are 2 fat-containing ventral hernias.  No herniated bowel loops.  4.  Potential constipation.  5.  Possible old surgical cerclage of the cervix.  6.  Advanced degenerative spine change inferiorly with advanced SI joint degeneration.      ASSESSMENT/PLAN:     74yo F with colonic adenocarcinoma now s/p subtotal colectomy with partial gastrectomy on 5/10/22 with final path showing Stage II T4bN0 adenocarcinoma with negative margins    - CEA level today and q3 months  - CT q6-12 months, will order to be done within next month  - Cscope due in 2026  - RTC 3  months for surveillance    Maury Liu MD  Colon and Rectal Surgery  [facility]

## 2023-10-12 LAB — CEA SERPL-MCNC: 2.8 NG/ML (ref 0–5)

## 2023-10-23 ENCOUNTER — TELEPHONE (OUTPATIENT)
Dept: FAMILY MEDICINE | Facility: CLINIC | Age: 73
End: 2023-10-23
Payer: MEDICARE

## 2023-10-23 NOTE — TELEPHONE ENCOUNTER
----- Message from Rody Pichardo sent at 10/23/2023 11:37 AM CDT -----  Contact: Ambar   Ambar [place]arington rachel a call back with questions about a NP that works with Dr Murrieta

## 2023-11-07 ENCOUNTER — LAB VISIT (OUTPATIENT)
Dept: LAB | Facility: HOSPITAL | Age: 73
End: 2023-11-07
Attending: FAMILY MEDICINE
Payer: MEDICARE

## 2023-11-07 ENCOUNTER — OFFICE VISIT (OUTPATIENT)
Dept: FAMILY MEDICINE | Facility: CLINIC | Age: 73
End: 2023-11-07
Attending: FAMILY MEDICINE
Payer: MEDICARE

## 2023-11-07 VITALS
DIASTOLIC BLOOD PRESSURE: 88 MMHG | HEIGHT: 70 IN | SYSTOLIC BLOOD PRESSURE: 136 MMHG | RESPIRATION RATE: 18 BRPM | WEIGHT: 216.5 LBS | OXYGEN SATURATION: 96 % | TEMPERATURE: 97 F | BODY MASS INDEX: 30.99 KG/M2 | HEART RATE: 72 BPM

## 2023-11-07 DIAGNOSIS — D50.9 MICROCYTIC ANEMIA: ICD-10-CM

## 2023-11-07 DIAGNOSIS — Z78.0 POSTMENOPAUSAL: ICD-10-CM

## 2023-11-07 DIAGNOSIS — I10 PRIMARY HYPERTENSION: Primary | ICD-10-CM

## 2023-11-07 DIAGNOSIS — C18.7 MALIGNANT NEOPLASM OF SIGMOID COLON: ICD-10-CM

## 2023-11-07 DIAGNOSIS — E66.9 OBESITY (BMI 30.0-34.9): ICD-10-CM

## 2023-11-07 PROBLEM — E66.811 OBESITY (BMI 30.0-34.9): Status: ACTIVE | Noted: 2023-11-07

## 2023-11-07 LAB
BASOPHILS # BLD AUTO: 0.02 K/UL (ref 0–0.2)
BASOPHILS NFR BLD: 0.4 % (ref 0–1.9)
DIFFERENTIAL METHOD: ABNORMAL
EOSINOPHIL # BLD AUTO: 0.1 K/UL (ref 0–0.5)
EOSINOPHIL NFR BLD: 1.6 % (ref 0–8)
ERYTHROCYTE [DISTWIDTH] IN BLOOD BY AUTOMATED COUNT: 13.7 % (ref 11.5–14.5)
HCT VFR BLD AUTO: 39.5 % (ref 37–48.5)
HGB BLD-MCNC: 12.6 G/DL (ref 12–16)
IMM GRANULOCYTES # BLD AUTO: 0.01 K/UL (ref 0–0.04)
IMM GRANULOCYTES NFR BLD AUTO: 0.2 % (ref 0–0.5)
LYMPHOCYTES # BLD AUTO: 1.9 K/UL (ref 1–4.8)
LYMPHOCYTES NFR BLD: 37.4 % (ref 18–48)
MCH RBC QN AUTO: 29.1 PG (ref 27–31)
MCHC RBC AUTO-ENTMCNC: 31.9 G/DL (ref 32–36)
MCV RBC AUTO: 91 FL (ref 82–98)
MONOCYTES # BLD AUTO: 0.3 K/UL (ref 0.3–1)
MONOCYTES NFR BLD: 6.2 % (ref 4–15)
NEUTROPHILS # BLD AUTO: 2.8 K/UL (ref 1.8–7.7)
NEUTROPHILS NFR BLD: 54.2 % (ref 38–73)
NRBC BLD-RTO: 0 /100 WBC
PLATELET # BLD AUTO: 224 K/UL (ref 150–450)
PMV BLD AUTO: 10.3 FL (ref 9.2–12.9)
RBC # BLD AUTO: 4.33 M/UL (ref 4–5.4)
WBC # BLD AUTO: 5.14 K/UL (ref 3.9–12.7)

## 2023-11-07 PROCEDURE — 85025 COMPLETE CBC W/AUTO DIFF WBC: CPT | Performed by: FAMILY MEDICINE

## 2023-11-07 PROCEDURE — 3044F PR MOST RECENT HEMOGLOBIN A1C LEVEL <7.0%: ICD-10-PCS | Mod: CPTII,S$GLB,, | Performed by: FAMILY MEDICINE

## 2023-11-07 PROCEDURE — 99999 PR PBB SHADOW E&M-EST. PATIENT-LVL IV: ICD-10-PCS | Mod: PBBFAC,,, | Performed by: FAMILY MEDICINE

## 2023-11-07 PROCEDURE — 1159F MED LIST DOCD IN RCRD: CPT | Mod: CPTII,S$GLB,, | Performed by: FAMILY MEDICINE

## 2023-11-07 PROCEDURE — 1125F AMNT PAIN NOTED PAIN PRSNT: CPT | Mod: CPTII,S$GLB,, | Performed by: FAMILY MEDICINE

## 2023-11-07 PROCEDURE — 82728 ASSAY OF FERRITIN: CPT | Performed by: FAMILY MEDICINE

## 2023-11-07 PROCEDURE — 3288F PR FALLS RISK ASSESSMENT DOCUMENTED: ICD-10-PCS | Mod: CPTII,S$GLB,, | Performed by: FAMILY MEDICINE

## 2023-11-07 PROCEDURE — 3079F DIAST BP 80-89 MM HG: CPT | Mod: CPTII,S$GLB,, | Performed by: FAMILY MEDICINE

## 2023-11-07 PROCEDURE — 3075F PR MOST RECENT SYSTOLIC BLOOD PRESS GE 130-139MM HG: ICD-10-PCS | Mod: CPTII,S$GLB,, | Performed by: FAMILY MEDICINE

## 2023-11-07 PROCEDURE — 1159F PR MEDICATION LIST DOCUMENTED IN MEDICAL RECORD: ICD-10-PCS | Mod: CPTII,S$GLB,, | Performed by: FAMILY MEDICINE

## 2023-11-07 PROCEDURE — 99999 PR PBB SHADOW E&M-EST. PATIENT-LVL IV: CPT | Mod: PBBFAC,,, | Performed by: FAMILY MEDICINE

## 2023-11-07 PROCEDURE — 1101F PR PT FALLS ASSESS DOC 0-1 FALLS W/OUT INJ PAST YR: ICD-10-PCS | Mod: CPTII,S$GLB,, | Performed by: FAMILY MEDICINE

## 2023-11-07 PROCEDURE — 3008F PR BODY MASS INDEX (BMI) DOCUMENTED: ICD-10-PCS | Mod: CPTII,S$GLB,, | Performed by: FAMILY MEDICINE

## 2023-11-07 PROCEDURE — 1125F PR PAIN SEVERITY QUANTIFIED, PAIN PRESENT: ICD-10-PCS | Mod: CPTII,S$GLB,, | Performed by: FAMILY MEDICINE

## 2023-11-07 PROCEDURE — 99214 OFFICE O/P EST MOD 30 MIN: CPT | Mod: S$GLB,,, | Performed by: FAMILY MEDICINE

## 2023-11-07 PROCEDURE — 3288F FALL RISK ASSESSMENT DOCD: CPT | Mod: CPTII,S$GLB,, | Performed by: FAMILY MEDICINE

## 2023-11-07 PROCEDURE — 1160F PR REVIEW ALL MEDS BY PRESCRIBER/CLIN PHARMACIST DOCUMENTED: ICD-10-PCS | Mod: CPTII,S$GLB,, | Performed by: FAMILY MEDICINE

## 2023-11-07 PROCEDURE — 99214 PR OFFICE/OUTPT VISIT, EST, LEVL IV, 30-39 MIN: ICD-10-PCS | Mod: S$GLB,,, | Performed by: FAMILY MEDICINE

## 2023-11-07 PROCEDURE — 1101F PT FALLS ASSESS-DOCD LE1/YR: CPT | Mod: CPTII,S$GLB,, | Performed by: FAMILY MEDICINE

## 2023-11-07 PROCEDURE — 3044F HG A1C LEVEL LT 7.0%: CPT | Mod: CPTII,S$GLB,, | Performed by: FAMILY MEDICINE

## 2023-11-07 PROCEDURE — 1160F RVW MEDS BY RX/DR IN RCRD: CPT | Mod: CPTII,S$GLB,, | Performed by: FAMILY MEDICINE

## 2023-11-07 PROCEDURE — 3008F BODY MASS INDEX DOCD: CPT | Mod: CPTII,S$GLB,, | Performed by: FAMILY MEDICINE

## 2023-11-07 PROCEDURE — 3075F SYST BP GE 130 - 139MM HG: CPT | Mod: CPTII,S$GLB,, | Performed by: FAMILY MEDICINE

## 2023-11-07 PROCEDURE — 36415 COLL VENOUS BLD VENIPUNCTURE: CPT | Mod: PO | Performed by: FAMILY MEDICINE

## 2023-11-07 PROCEDURE — 3079F PR MOST RECENT DIASTOLIC BLOOD PRESSURE 80-89 MM HG: ICD-10-PCS | Mod: CPTII,S$GLB,, | Performed by: FAMILY MEDICINE

## 2023-11-07 NOTE — PROGRESS NOTES
Ambar Duenas    Chief Complaint   Patient presents with    Establish Care       History of Present Illness:   Ms. Duenas comes in today to establish care with me.  She states she is been previously followed by PCP Dr. Gin Vitale.  She states she is not fasting and has not taken medications today.    She states she walks 5 times a week, 1 mi in 30 minutes and monitors her diet.  She states she occasionally performs home blood pressure checks daily to every other day with levels ranging 134/80's.  She states she takes blood pressure medications 2 times a week but manages blood pressure as she takes herbs daily.      She states she follows with Dr. Maury Liu, colorectal surgeon, with last visit on October 11, 2023 for surveillance colon cancer with CEA, creatinine, CT scan advised and scheduled for November 15, 2023.  She states she has CT scan every 6 months.  She requests labs for anemia check.    She complains of having occasional pain in her right shoulder with shoulder grooves.  She also complains of having occasional pain in her right hip and follows with Dr. Mumtaz Darling, sports Medicine, with last visit on May 19, 2023.    Otherwise, she denies having fever, chills, fatigue, appetite changes; shortness of breath, cough, wheezing; chest pain, palpitations, leg swelling; abdominal pain, nausea, vomiting, diarrhea, constipation; unusual urinary symptoms; polydipsia, polyphagia, polyuria, hot or cold intolerance; back pain; acute visual changes, numbness, headache; anxiety, depression, homicidal or suicidal thoughts.                Labs:                     WBC                      5.18                05/16/2023                 HGB                      11.7 (L)            05/16/2023                 HCT                      38.2                05/16/2023                 PLT                      235                 05/16/2023                 CHOL                     263 (H)             05/16/2023                  TRIG                     65                  05/16/2023                 HDL                      74                  05/16/2023                 ALT                      15                  05/16/2023                 AST                      22                  05/16/2023                 NA                       139                 05/16/2023                 K                        4.0                 05/16/2023                 CL                       102                 05/16/2023                 CREATININE               0.7                 05/16/2023                 BUN                      11                  05/16/2023                 CO2                      26                  05/16/2023                 TSH                      1.710               04/20/2022                 HGBA1C                   5.5                 05/16/2023                LDLCALC                  176.0 (H)           05/16/2023                Past Medical History:   Diagnosis Date    Anemia, unspecified     Fibroids     GERD (gastroesophageal reflux disease)     History of colon cancer     Hyperlipidemia     Hypertension     Postmenopausal       Current Outpatient Medications   Medication Sig    amLODIPine (NORVASC) 5 MG tablet Take 1 tablet (5 mg total) by mouth once daily.    ibuprofen (ADVIL,MOTRIN) 800 MG tablet Take 1 tablet (800 mg total) by mouth every 6 (six) hours as needed.    montelukast (SINGULAIR) 10 mg tablet Take 1 tablet (10 mg total) by mouth every evening.    pantoprazole (PROTONIX) 40 MG tablet Take 1 tablet (40 mg total) by mouth 2 (two) times daily.       Review of Systems   Constitutional:  Negative for activity change, appetite change, chills, fatigue and fever.   Eyes:  Negative for visual disturbance.   Respiratory:  Negative for cough, shortness of breath and wheezing.    Cardiovascular:  Negative for chest pain, palpitations and leg swelling.        See history of present illness.   Gastrointestinal:   Negative for abdominal pain, constipation, diarrhea, nausea and vomiting.        See history of present illness.   Endocrine: Negative for cold intolerance, heat intolerance, polydipsia, polyphagia and polyuria.   Genitourinary:  Negative for difficulty urinating.   Musculoskeletal:  Positive for arthralgias and myalgias. Negative for back pain.        See history of present illness.   Neurological:  Negative for numbness and headaches.   Psychiatric/Behavioral:  Negative for dysphoric mood and suicidal ideas. The patient is not nervous/anxious.         Negative for homicidal ideas.       Objective:  Physical Exam  Vitals reviewed.   Constitutional:       General: She is not in acute distress.     Appearance: Normal appearance. She is obese. She is not ill-appearing, toxic-appearing or diaphoretic.      Comments: Pleasant.   Cardiovascular:      Rate and Rhythm: Normal rate and regular rhythm.      Pulses: Normal pulses.      Heart sounds: No murmur heard.  Pulmonary:      Effort: Pulmonary effort is normal. No respiratory distress.      Breath sounds: Normal breath sounds. No wheezing.   Abdominal:      General: Bowel sounds are normal. There is no distension.      Palpations: Abdomen is soft. There is no mass.      Tenderness: There is no abdominal tenderness. There is no guarding or rebound.   Musculoskeletal:         General: No swelling or tenderness. Normal range of motion.      Cervical back: Normal range of motion and neck supple. No tenderness.      Comments: She is ambulatory without problems.   Lymphadenopathy:      Cervical: No cervical adenopathy.   Skin:     General: Skin is warm.   Neurological:      General: No focal deficit present.      Mental Status: She is alert and oriented to person, place, and time.   Psychiatric:         Mood and Affect: Mood normal.         Behavior: Behavior normal.         Thought Content: Thought content normal.         Judgment: Judgment normal.         ASSESSMENT:  1.  Primary hypertension    2. Microcytic anemia    3. Malignant neoplasm of sigmoid colon    4. Obesity (BMI 30.0-34.9)    5. Postmenopausal        PLAN:  Ambar was seen today for establish care.    Diagnoses and all orders for this visit:    Primary hypertension    Microcytic anemia  -     CBC Auto Differential; Future  -     Ferritin; Future    Malignant neoplasm of sigmoid colon    Obesity (BMI 30.0-34.9)    Postmenopausal      Patient advised to call for results.  Continue current medications, follow low sodium, low cholesterol, low carb diet, daily walks.  Keep follow up with specialists.  Patient declines flu shot.  Follow up in about 6 months (around 5/13/2024) for physical.

## 2023-11-08 LAB — FERRITIN SERPL-MCNC: 16 NG/ML (ref 20–300)

## 2023-11-13 DIAGNOSIS — Z85.038 PERSONAL HISTORY OF COLON CANCER: Primary | ICD-10-CM

## 2023-11-15 ENCOUNTER — HOSPITAL ENCOUNTER (OUTPATIENT)
Dept: RADIOLOGY | Facility: HOSPITAL | Age: 73
Discharge: HOME | End: 2023-11-15
Attending: COLON & RECTAL SURGERY
Payer: MEDICARE

## 2023-11-15 DIAGNOSIS — Z85.038 PERSONAL HISTORY OF COLON CANCER: ICD-10-CM

## 2023-11-15 PROCEDURE — 74177 CT CHEST ABDOMEN PELVIS WITH CONTRAST (XPD): ICD-10-PCS | Mod: 26,,, | Performed by: RADIOLOGY

## 2023-11-15 PROCEDURE — 25500020 PHARM REV CODE 255: Performed by: COLON & RECTAL SURGERY

## 2023-11-15 PROCEDURE — 74177 CT ABD & PELVIS W/CONTRAST: CPT | Mod: 26,,, | Performed by: RADIOLOGY

## 2023-11-15 PROCEDURE — 74177 CT ABD & PELVIS W/CONTRAST: CPT | Mod: TC

## 2023-11-15 PROCEDURE — A9698 NON-RAD CONTRAST MATERIALNOC: HCPCS | Performed by: COLON & RECTAL SURGERY

## 2023-11-15 PROCEDURE — 71260 CT CHEST ABDOMEN PELVIS WITH CONTRAST (XPD): ICD-10-PCS | Mod: 26,,, | Performed by: RADIOLOGY

## 2023-11-15 PROCEDURE — 71260 CT THORAX DX C+: CPT | Mod: TC

## 2023-11-15 PROCEDURE — 71260 CT THORAX DX C+: CPT | Mod: 26,,, | Performed by: RADIOLOGY

## 2023-11-15 RX ADMIN — IOHEXOL 100 ML: 350 INJECTION, SOLUTION INTRAVENOUS at 01:11

## 2023-11-15 RX ADMIN — IOHEXOL 1000 ML: 12 SOLUTION ORAL at 12:11

## 2024-01-22 ENCOUNTER — OFFICE VISIT (OUTPATIENT)
Dept: SURGERY | Facility: CLINIC | Age: 74
End: 2024-01-22
Payer: MEDICARE

## 2024-01-22 ENCOUNTER — LAB VISIT (OUTPATIENT)
Dept: LAB | Facility: HOSPITAL | Age: 74
End: 2024-01-22
Attending: COLON & RECTAL SURGERY
Payer: MEDICARE

## 2024-01-22 VITALS — DIASTOLIC BLOOD PRESSURE: 81 MMHG | SYSTOLIC BLOOD PRESSURE: 173 MMHG

## 2024-01-22 DIAGNOSIS — Z85.038 PERSONAL HISTORY OF COLON CANCER: Primary | ICD-10-CM

## 2024-01-22 DIAGNOSIS — Z85.038 PERSONAL HISTORY OF COLON CANCER: ICD-10-CM

## 2024-01-22 LAB — CEA SERPL-MCNC: 2.4 NG/ML (ref 0–5)

## 2024-01-22 PROCEDURE — 1126F AMNT PAIN NOTED NONE PRSNT: CPT | Mod: CPTII,S$GLB,, | Performed by: COLON & RECTAL SURGERY

## 2024-01-22 PROCEDURE — 36415 COLL VENOUS BLD VENIPUNCTURE: CPT | Performed by: COLON & RECTAL SURGERY

## 2024-01-22 PROCEDURE — 99214 OFFICE O/P EST MOD 30 MIN: CPT | Mod: S$GLB,,, | Performed by: COLON & RECTAL SURGERY

## 2024-01-22 PROCEDURE — 3077F SYST BP >= 140 MM HG: CPT | Mod: CPTII,S$GLB,, | Performed by: COLON & RECTAL SURGERY

## 2024-01-22 PROCEDURE — 82378 CARCINOEMBRYONIC ANTIGEN: CPT | Performed by: COLON & RECTAL SURGERY

## 2024-01-22 PROCEDURE — 99999 PR PBB SHADOW E&M-EST. PATIENT-LVL II: CPT | Mod: PBBFAC,,, | Performed by: COLON & RECTAL SURGERY

## 2024-01-22 PROCEDURE — 3079F DIAST BP 80-89 MM HG: CPT | Mod: CPTII,S$GLB,, | Performed by: COLON & RECTAL SURGERY

## 2024-01-22 PROCEDURE — 1159F MED LIST DOCD IN RCRD: CPT | Mod: CPTII,S$GLB,, | Performed by: COLON & RECTAL SURGERY

## 2024-01-22 NOTE — PROGRESS NOTES
History & Physical    SUBJECTIVE:     Chief Complaint   Patient presents with    Follow-up     3 month follow up     Ref: Melanie Ballard MD    History of Present Illness:  Patient is a 73 y.o. female presents forEvaluation of colonic adenocarcinoma.  Patient was undergoing surveillance colonoscopy in 2022 where she was found to have a almost completely obstructing colon mass that was biopsied and resulted as adenocarcinoma.  This was initially thought to be at the sigmoid colon upon imaging with CT scan of the chest abdomen pelvis, no metastatic disease was found but she was found to have a large fungating mass near the splenic flexure with a normal appearing sigmoid.  She has been asymptomatic but before the colonoscopy in since that time.  She denies any fever, chills, nausea, vomiting, hematochezia, melena or abdominal pain.  She only reports a minor amount of reflux that has since resolved with the addition of pantoprazole.  Her only significant past surgical history is  section via lower midline incision.  She denies any family history of colorectal cancer or IBD.  She has had previous colonic polyps on a colonoscopy around 7 years ago.  She reports she is still having normal bowel movements.    5/10/2022: open subtotal colectomy, partial gastrectomy (final path: Stage II T4N0 adenocarcinoma    Interval history:  Since last clinic visit, the patient to do well.  Denies any hematochezia or melena.  Is having no abdominal pain.  Is tolerating regular diet.    Review of patient's allergies indicates:  No Known Allergies    Current Outpatient Medications   Medication Sig Dispense Refill    amLODIPine (NORVASC) 5 MG tablet Take 1 tablet (5 mg total) by mouth once daily. 90 tablet 1    montelukast (SINGULAIR) 10 mg tablet Take 1 tablet (10 mg total) by mouth every evening. 90 tablet 1    pantoprazole (PROTONIX) 40 MG tablet Take 1 tablet (40 mg total) by mouth 2 (two) times daily. 180 tablet 1     ibuprofen (ADVIL,MOTRIN) 800 MG tablet Take 1 tablet (800 mg total) by mouth every 6 (six) hours as needed. (Patient not taking: Reported on 1/22/2024) 30 tablet 1     No current facility-administered medications for this visit.       Past Medical History:   Diagnosis Date    Anemia, unspecified     Fibroids     GERD (gastroesophageal reflux disease)     History of colon cancer     Hyperlipidemia     Hypertension     Postmenopausal      Past Surgical History:   Procedure Laterality Date    COLONOSCOPY N/A 3/28/2022    Procedure: COLONOSCOPY;  Surgeon: Melanie Ballard MD;  Location: [facility] ENDO;  Service: Endoscopy;  Laterality: N/A;    COLONOSCOPY N/A 6/28/2023    Procedure: COLONOSCOPY;  Surgeon: Maury Liu MD;  Location: [facility];  Service: General;  Laterality: N/A;    ESOPHAGOGASTRODUODENOSCOPY N/A 3/28/2022    Procedure: ESOPHAGOGASTRODUODENOSCOPY (EGD);  Surgeon: Melanie Ballard MD;  Location: [facility];  Service: Endoscopy;  Laterality: N/A;    HEMORRHOID SURGERY      HYSTEROSCOPY WITH DILATION AND CURETTAGE OF UTERUS N/A 5/10/2022    Procedure: HYSTEROSCOPY, WITH DILATION AND CURETTAGE OF UTERUS;  Surgeon: Madelyn West MD;  Location: [facility] OR;  Service: OB/GYN;  Laterality: N/A;    INJECTION OF ANESTHETIC AGENT INTO TISSUE PLANE DEFINED BY TRANSVERSUS ABDOMINIS MUSCLE N/A 5/10/2022    Procedure: BLOCK, TRANSVERSUS ABDOMINIS PLANE;  Surgeon: Maury Lui MD;  Location: [facility];  Service: General;  Laterality: N/A;    LIVER BIOPSY  5/10/2022    Procedure: BIOPSY, LIVER;  Surgeon: Maury Liu MD;  Location: [facility] OR;  Service: General;;    OMENTECTOMY  5/10/2022    Procedure: OMENTECTOMY;  Surgeon: Maury Liu MD;  Location: [facility] OR;  Service: General;;  partial    PARTIAL GASTRECTOMY N/A 5/10/2022    Procedure: GASTRECTOMY, PARTIAL;  Surgeon: Maury Liu MD;  Location: [facility] OR;  Service: General;  Laterality: N/A;    SUBTOTAL COLECTOMY Left 5/10/2022    Procedure:  COLECTOMY, PARTIAL;  Surgeon: Maury Liu MD;  Location: [facility];  Service: General;  Laterality: Left;  LEFT VS SUBTOTAl vs EXTENDED RIGHT     Family History   Problem Relation Age of Onset    No Known Problems Mother     No Known Problems Father      Social History     Tobacco Use    Smoking status: Never     Passive exposure: Never    Smokeless tobacco: Never   Substance Use Topics    Alcohol use: Yes     Alcohol/week: 2.0 standard drinks of alcohol     Types: 2 Glasses of wine per week     Comment: occ    Drug use: Never        Review of Systems:  Review of Systems   Constitutional:  Negative for activity change, appetite change, chills, fatigue, fever and unexpected weight change.   HENT:  Negative for congestion, ear pain, sore throat and trouble swallowing.    Eyes:  Negative for pain, redness and itching.   Respiratory:  Negative for cough, shortness of breath and wheezing.    Cardiovascular:  Negative for chest pain, palpitations and leg swelling.   Gastrointestinal:  Negative for abdominal distention, abdominal pain, anal bleeding, blood in stool, constipation, diarrhea, nausea, rectal pain and vomiting.   Endocrine: Negative for cold intolerance, heat intolerance and polyuria.   Genitourinary:  Negative for dysuria, flank pain, frequency and hematuria.   Musculoskeletal:  Negative for gait problem, joint swelling and neck pain.   Skin:  Negative for color change, rash and wound.   Allergic/Immunologic: Negative for environmental allergies and immunocompromised state.   Neurological:  Negative for dizziness, speech difficulty, weakness and numbness.   Psychiatric/Behavioral:  Negative for agitation, confusion and hallucinations.        OBJECTIVE:     Vital Signs (Most Recent)  BP: (!) 173/81 (01/22/24 1438)           Physical Exam:  Physical Exam  Constitutional:       Appearance: She is well-developed.   HENT:      Head: Normocephalic and atraumatic.   Eyes:      Conjunctiva/sclera: Conjunctivae  normal.   Neck:      Thyroid: No thyromegaly.   Cardiovascular:      Rate and Rhythm: Normal rate and regular rhythm.   Pulmonary:      Effort: Pulmonary effort is normal. No respiratory distress.   Abdominal:      Comments: Soft, nondistended, nontender; well-healed midline incision   Musculoskeletal:         General: No tenderness. Normal range of motion.      Cervical back: Normal range of motion.   Skin:     General: Skin is warm and dry.      Capillary Refill: Capillary refill takes less than 2 seconds.      Findings: No rash.   Neurological:      Mental Status: She is alert and oriented to person, place, and time.         Laboratory  Lab Results   Component Value Date    WBC 5.14 11/07/2023    HGB 12.6 11/07/2023    HCT 39.5 11/07/2023     11/07/2023    CHOL 263 (H) 05/16/2023    TRIG 65 05/16/2023    HDL 74 05/16/2023    ALT 15 05/16/2023    AST 22 05/16/2023     05/16/2023    K 4.0 05/16/2023     05/16/2023    CREATININE 0.8 11/15/2023    BUN 11 05/16/2023    CO2 26 05/16/2023    TSH 1.710 04/20/2022    HGBA1C 5.5 05/16/2023       Component Ref Range & Units 3 mo ago  (10/11/23) 8 mo ago  (5/22/23) 11 mo ago  (2/13/23) 1 yr ago  (9/12/22) 1 yr ago  (4/20/22)   CEA 0.0 - 5.0 ng/mL 2.8 1.9 CM 2.2 CM 2.1 CM 3.2 CM       Diagnostic Results:  CT: Reviewed  Colonoscopy: reviewed    CT Nov 2023:  FINDINGS:  CHEST     No infiltrates or pleural effusions are identified.  No discrete pulmonary nodules or masses are identified.  Calcified granuloma noted within the right middle lobe.     The aorta is unremarkable in appearance. There is no pericardial effusion.  No enlarged mediastinal, hilar or axillary lymph nodes are identified.     ABDOMEN     Liver/gallbladder/biliary: The liver demonstrates no focal abnormality. The gallbladder is present and unremarkable.  No biliary ductal dilation.     Pancreas: The pancreas is unremarkable in appearance.     Spleen: The spleen is not enlarged.     Adrenals:  Unremarkable     Kidneys: The kidneys are equally perfused and demonstrate no solid masses. There are at least 4 nonobstructing right renal calculi the largest of which measures approximately 4-5 mm in size.  There is also a 2-3 mm nonobstructing lower pole left renal calculus.     Bowel/Mesentery: There is no evidence of bowel obstruction. There are 2 midline supraumbilical fat filled ventral hernias.  There is also a periumbilical hernia containing a loop of small bowel.  No evidence to suggest a bowel obstruction.  There are postoperative changes seen associated with the colon in the region of the splenic flexure.  Patient appears to be status post right hemicolectomy.  No mesenteric stranding or adenopathy.     Retroperitoneum: No adenopathy.  Vascular calcification seen involving the aorta and iliac arteries.  No aneurysm.     PELVIS     Genitourinary/Reproductive organs: Calcified structure again seen in the expected location of the cervix.     Adenopathy: None     Free Fluid: No free fluid     Osseus Structures/Soft tissues: No suspicious appearing osseous lesions are identified.  There is grade 1 spondylolisthesis of L4 on L5.     Impression:     1. No evidence to suggest recurrent or metastatic disease.  2. Bilateral nonobstructing renal calculi.  Multiple ventral hernias including a periumbilical hernia containing a loop of small bowel.  No evidence to suggest bowel obstruction.  3. Remaining findings as discussed above.    ASSESSMENT/PLAN:     74yo F with colonic adenocarcinoma now s/p subtotal colectomy with partial gastrectomy on 5/10/22 with final path showing Stage II T4bN0 adenocarcinoma with negative margins    - CEA level today and q3 months  - CT q6-12 months  - Cscope due in 2026  - RTC 3 months for surveillance    Maury Liu MD  Colon and Rectal Surgery  [facility]

## 2024-04-01 ENCOUNTER — OFFICE VISIT (OUTPATIENT)
Dept: OBSTETRICS AND GYNECOLOGY | Facility: CLINIC | Age: 74
End: 2024-04-01
Payer: MEDICARE

## 2024-04-01 VITALS
DIASTOLIC BLOOD PRESSURE: 90 MMHG | HEIGHT: 70 IN | SYSTOLIC BLOOD PRESSURE: 140 MMHG | BODY MASS INDEX: 32.32 KG/M2 | WEIGHT: 225.75 LBS

## 2024-04-01 DIAGNOSIS — Z12.4 SCREENING FOR CERVICAL CANCER: ICD-10-CM

## 2024-04-01 DIAGNOSIS — N95.0 POSTMENOPAUSAL BLEEDING: ICD-10-CM

## 2024-04-01 DIAGNOSIS — Z01.419 ENCOUNTER FOR GYNECOLOGICAL EXAMINATION (GENERAL) (ROUTINE) WITHOUT ABNORMAL FINDINGS: Primary | ICD-10-CM

## 2024-04-01 DIAGNOSIS — Z12.31 ENCOUNTER FOR SCREENING MAMMOGRAM FOR BREAST CANCER: ICD-10-CM

## 2024-04-01 PROCEDURE — 87624 HPV HI-RISK TYP POOLED RSLT: CPT | Performed by: OBSTETRICS & GYNECOLOGY

## 2024-04-01 PROCEDURE — 99999 PR PBB SHADOW E&M-EST. PATIENT-LVL III: CPT | Mod: PBBFAC,,, | Performed by: OBSTETRICS & GYNECOLOGY

## 2024-04-01 PROCEDURE — 1101F PT FALLS ASSESS-DOCD LE1/YR: CPT | Mod: CPTII,S$GLB,, | Performed by: OBSTETRICS & GYNECOLOGY

## 2024-04-01 PROCEDURE — 88141 CYTOPATH C/V INTERPRET: CPT | Mod: ,,, | Performed by: PATHOLOGY

## 2024-04-01 PROCEDURE — 88300 SURGICAL PATH GROSS: CPT | Mod: 26,,, | Performed by: PATHOLOGY

## 2024-04-01 PROCEDURE — 3077F SYST BP >= 140 MM HG: CPT | Mod: CPTII,S$GLB,, | Performed by: OBSTETRICS & GYNECOLOGY

## 2024-04-01 PROCEDURE — 3288F FALL RISK ASSESSMENT DOCD: CPT | Mod: CPTII,S$GLB,, | Performed by: OBSTETRICS & GYNECOLOGY

## 2024-04-01 PROCEDURE — 1159F MED LIST DOCD IN RCRD: CPT | Mod: CPTII,S$GLB,, | Performed by: OBSTETRICS & GYNECOLOGY

## 2024-04-01 PROCEDURE — 88175 CYTOPATH C/V AUTO FLUID REDO: CPT | Performed by: PATHOLOGY

## 2024-04-01 PROCEDURE — 1126F AMNT PAIN NOTED NONE PRSNT: CPT | Mod: CPTII,S$GLB,, | Performed by: OBSTETRICS & GYNECOLOGY

## 2024-04-01 PROCEDURE — 3008F BODY MASS INDEX DOCD: CPT | Mod: CPTII,S$GLB,, | Performed by: OBSTETRICS & GYNECOLOGY

## 2024-04-01 PROCEDURE — 3080F DIAST BP >= 90 MM HG: CPT | Mod: CPTII,S$GLB,, | Performed by: OBSTETRICS & GYNECOLOGY

## 2024-04-01 PROCEDURE — 88300 SURGICAL PATH GROSS: CPT | Performed by: PATHOLOGY

## 2024-04-01 PROCEDURE — 99397 PER PM REEVAL EST PAT 65+ YR: CPT | Mod: S$GLB,,, | Performed by: OBSTETRICS & GYNECOLOGY

## 2024-04-01 NOTE — PROGRESS NOTES
Subjective:       Patient ID: Ambar Duenas is a 73 y.o. female.    Chief Complaint:  Annual Exam      History of Present Illness  HPI  Annual Exam-Postmenopausal  Patient presents for annual exam. The patient has no complaints today. The patient is sexually active.-occas vaginal dryness;  GYN screening history: last pap: approximate date 3/2022 and was normal and last mammogram: approximate date 3/2023 and was normal. The patient has never been taking hormone replacement therapy. Patient denies post-menopausal vaginal bleeding. The patient wears seatbelts: yes. The patient participates in regular exercise: yes. Walks daily; Has the patient ever been transfused or tattooed?: yes.+transfusion;  The patient reports that there is not domestic violence in her life.  Night sweats  Occas bladder leakage    Colsonocopsy   GYN & OB History  Patient's last menstrual period was 2019.   Date of Last Pap: 2024    OB History    Para Term  AB Living   1 1 1     1   SAB IAB Ectopic Multiple Live Births           1      # Outcome Date GA Lbr Jordan/2nd Weight Sex Delivery Anes PTL Lv   1 Term      Vag-Spont   GAVI       Review of Systems  Review of Systems   Genitourinary:  Positive for postcoital bleeding and postmenopausal bleeding.   All other systems reviewed and are negative.          Objective:      Physical Exam:   Constitutional: She is oriented to person, place, and time. She appears well-developed and well-nourished.     Eyes: Pupils are equal, round, and reactive to light. Conjunctivae and EOM are normal.      Pulmonary/Chest: Effort normal. Right breast exhibits no mass, no nipple discharge, no skin change and no tenderness. Left breast exhibits no mass, no nipple discharge, no skin change and no tenderness. Breasts are symmetrical.        Abdominal: Soft.     Genitourinary:    Inguinal canal, urethra, bladder, vagina, uterus, right adnexa, left adnexa and rectum normal.      Pelvic exam was  performed with patient supine.   The external female genitalia was normal.     Labial bartholins normal.Cervix is normal. Right adnexum displays no mass and no tenderness. Left adnexum displays no mass and no tenderness. No erythema, vaginal discharge, bleeding, rectocele, cystocele or prolapse of vaginal walls in the vagina. Vagina was moist.Cervix exhibits friability.    pap smear completedUerus contour normal  Normal urethral meatus.Urethra findings: no urethral massBladder findings: no bladder distention and no bladder tenderness          Musculoskeletal: Normal range of motion and moves all extremeties.       Neurological: She is alert and oriented to person, place, and time.    Skin: Skin is warm.    Psychiatric: She has a normal mood and affect. Her behavior is normal.           Assessment:        1. Encounter for gynecological examination (general) (routine) without abnormal findings    2. Encounter for screening mammogram for breast cancer    3. Postmenopausal bleeding    4. Screening for cervical cancer               Plan:      Continue annual well woman exam.  Pap today; Reviewed updated recommendations for pap smears (every 3 years) in low risk patients.   Recommend annual pelvic exams.  Reviewed recommendations for annual CBE.  (Pap taken--+hpv, reports pmb)  Pelvic sono to eval ut for etiol of pmb  Something felt to be poking through cervix; grased with forcep--suture removed (pt reports having cerclage in place)  mammo ordered, continue yearly until age 77  Osteoporosis prevention; 1200mg calcium/d with source of vitamin d   Bmd due next crystal  Colonsocpy per dr castro 2026 and f/u q 3 months  Continue diet, exercise, weight loss

## 2024-04-03 DIAGNOSIS — I10 ESSENTIAL HYPERTENSION: ICD-10-CM

## 2024-04-03 RX ORDER — AMLODIPINE BESYLATE 5 MG/1
5 TABLET ORAL DAILY
Qty: 90 TABLET | Refills: 1 | Status: CANCELLED | OUTPATIENT
Start: 2024-04-03

## 2024-04-03 NOTE — TELEPHONE ENCOUNTER
----- Message from Dennis Basurto sent at 4/3/2024  4:10 PM CDT -----  Contact: Garrison  Type:  RX Refill Request    Who Called: garrison  Refill or New Rx:refill  RX Name and Strength: amLODIPine (NORVASC) 5 MG tablet  How is the patient currently taking it? (ex. 1XDay):   Is this a 30 day or 90 day RX: 30days   Preferred Pharmacy with phone number:  [id number] - [place], [place] - [address] AT [address] [address]  Phone: [phone] Fax: [phone]  Local or Mail Order:local  Ordering Provider:  Would the patient rather a call back or a response via [facility]?   Call   Best Call Back Number:[phone]    Additional Information:

## 2024-04-03 NOTE — TELEPHONE ENCOUNTER
No care due was identified.  [facility] Embedded Care Due Messages. Reference number: [id number].   4/03/2024 4:58:25 PM CDT

## 2024-04-05 ENCOUNTER — TELEPHONE (OUTPATIENT)
Dept: FAMILY MEDICINE | Facility: CLINIC | Age: 74
End: 2024-04-05
Payer: MEDICARE

## 2024-04-05 DIAGNOSIS — I10 ESSENTIAL HYPERTENSION: ICD-10-CM

## 2024-04-05 LAB
HPV HR 12 DNA SPEC QL NAA+PROBE: POSITIVE
HPV16 AG SPEC QL: NEGATIVE
HPV18 DNA SPEC QL NAA+PROBE: NEGATIVE

## 2024-04-05 RX ORDER — AMLODIPINE BESYLATE 5 MG/1
5 TABLET ORAL DAILY
Qty: 90 TABLET | Refills: 1 | Status: SHIPPED | OUTPATIENT
Start: 2024-04-05

## 2024-04-05 NOTE — TELEPHONE ENCOUNTER
Ok for appt for next week for legs pain.  Ok for Refill.Also, advise pt to take MVI daily.    I have put the following orders and/or medications to this note.  Please advise pt and assist.    No orders of the defined types were placed in this encounter.      Medications Ordered This Encounter   Medications    amLODIPine (NORVASC) 5 MG tablet     Sig: Take 1 tablet (5 mg total) by mouth once daily.     Dispense:  90 tablet     Refill:  1     .

## 2024-04-05 NOTE — TELEPHONE ENCOUNTER
----- Message from Silvia Causey sent at 4/5/2024  3:29 PM CDT -----  Regarding: appt  Name of who is calling:   Ambar      What is the request in detail: Pt is requesting a rx refill on rx Amlodipine 5 mgs and also would like to schedule an appt next week due to pain in legs      Can the clinic reply by [facility]:no      What number to call back if not [facility]: [phone]

## 2024-04-08 ENCOUNTER — TELEPHONE (OUTPATIENT)
Dept: FAMILY MEDICINE | Facility: CLINIC | Age: 74
End: 2024-04-08
Payer: MEDICARE

## 2024-04-08 NOTE — TELEPHONE ENCOUNTER
Message left for patient to call the office back regarding the following: Per Dr. Murrieta: Ok for appt for next week for legs pain.  Ok for Refill.Also, advise pt to take MVI daily.     I have put the following orders and/or medications to this note.  Please advise pt and assist.     No orders of the defined types were placed in this encounter.             Medications Ordered This Encounter   Medications    amLODIPine (NORVASC) 5 MG tablet       Sig: Take 1 tablet (5 mg total) by mouth once daily.       Dispense:  90 tablet       Refill:  1       .

## 2024-04-10 ENCOUNTER — TELEPHONE (OUTPATIENT)
Dept: FAMILY MEDICINE | Facility: CLINIC | Age: 74
End: 2024-04-10
Payer: MEDICARE

## 2024-04-10 LAB
FINAL PATHOLOGIC DIAGNOSIS: ABNORMAL
FINAL PATHOLOGIC DIAGNOSIS: NORMAL
GROSS: NORMAL
Lab: ABNORMAL
Lab: NORMAL

## 2024-04-16 ENCOUNTER — HOSPITAL ENCOUNTER (OUTPATIENT)
Dept: RADIOLOGY | Facility: HOSPITAL | Age: 74
Discharge: HOME | End: 2024-04-16
Attending: OBSTETRICS & GYNECOLOGY
Payer: MEDICARE

## 2024-04-16 DIAGNOSIS — N95.0 POSTMENOPAUSAL BLEEDING: ICD-10-CM

## 2024-04-16 PROCEDURE — 76856 US EXAM PELVIC COMPLETE: CPT | Mod: 26,,, | Performed by: RADIOLOGY

## 2024-04-16 PROCEDURE — 76856 US EXAM PELVIC COMPLETE: CPT | Mod: TC

## 2024-04-17 ENCOUNTER — LAB VISIT (OUTPATIENT)
Dept: LAB | Facility: HOSPITAL | Age: 74
End: 2024-04-17
Attending: COLON & RECTAL SURGERY
Payer: MEDICARE

## 2024-04-17 ENCOUNTER — OFFICE VISIT (OUTPATIENT)
Dept: SURGERY | Facility: CLINIC | Age: 74
End: 2024-04-17
Payer: MEDICARE

## 2024-04-17 VITALS
SYSTOLIC BLOOD PRESSURE: 153 MMHG | WEIGHT: 218 LBS | TEMPERATURE: 98 F | DIASTOLIC BLOOD PRESSURE: 91 MMHG | HEART RATE: 87 BPM | BODY MASS INDEX: 31.21 KG/M2 | OXYGEN SATURATION: 98 % | HEIGHT: 70 IN

## 2024-04-17 DIAGNOSIS — Z85.038 PERSONAL HISTORY OF COLON CANCER: ICD-10-CM

## 2024-04-17 DIAGNOSIS — C18.9 MALIGNANT NEOPLASM OF COLON, UNSPECIFIED PART OF COLON: ICD-10-CM

## 2024-04-17 DIAGNOSIS — Z85.038 PERSONAL HISTORY OF COLON CANCER: Primary | ICD-10-CM

## 2024-04-17 LAB — CEA SERPL-MCNC: 2.4 NG/ML (ref 0–5)

## 2024-04-17 PROCEDURE — 99999 PR PBB SHADOW E&M-EST. PATIENT-LVL III: CPT | Mod: PBBFAC,,, | Performed by: COLON & RECTAL SURGERY

## 2024-04-17 PROCEDURE — 3008F BODY MASS INDEX DOCD: CPT | Mod: CPTII,S$GLB,, | Performed by: COLON & RECTAL SURGERY

## 2024-04-17 PROCEDURE — 36415 COLL VENOUS BLD VENIPUNCTURE: CPT | Performed by: COLON & RECTAL SURGERY

## 2024-04-17 PROCEDURE — 1125F AMNT PAIN NOTED PAIN PRSNT: CPT | Mod: CPTII,S$GLB,, | Performed by: COLON & RECTAL SURGERY

## 2024-04-17 PROCEDURE — 3079F DIAST BP 80-89 MM HG: CPT | Mod: CPTII,S$GLB,, | Performed by: COLON & RECTAL SURGERY

## 2024-04-17 PROCEDURE — 99214 OFFICE O/P EST MOD 30 MIN: CPT | Mod: S$GLB,,, | Performed by: COLON & RECTAL SURGERY

## 2024-04-17 PROCEDURE — 3077F SYST BP >= 140 MM HG: CPT | Mod: CPTII,S$GLB,, | Performed by: COLON & RECTAL SURGERY

## 2024-04-17 PROCEDURE — 82378 CARCINOEMBRYONIC ANTIGEN: CPT | Performed by: COLON & RECTAL SURGERY

## 2024-04-17 PROCEDURE — 1159F MED LIST DOCD IN RCRD: CPT | Mod: CPTII,S$GLB,, | Performed by: COLON & RECTAL SURGERY

## 2024-04-17 NOTE — PROGRESS NOTES
Your pap was mildly abnormal asus;  I do not have to do anything to your cervix at this point, please make sure you keep your yearly exam in order to repeat your pap

## 2024-04-17 NOTE — PROGRESS NOTES
The pelvic sono results are available for review.  The uterus remains slightly enlarged; the lining is normal at 3.3 mm; both ovaries are normal in size. I suspect the bleeding was related to the cerclage

## 2024-04-17 NOTE — PROGRESS NOTES
History & Physical    SUBJECTIVE:     No chief complaint on file.    Ref: Melanie Ballard MD    History of Present Illness:  Patient is a 73 y.o. female presents forEvaluation of colonic adenocarcinoma.  Patient was undergoing surveillance colonoscopy in 2022 where she was found to have a almost completely obstructing colon mass that was biopsied and resulted as adenocarcinoma.  This was initially thought to be at the sigmoid colon upon imaging with CT scan of the chest abdomen pelvis, no metastatic disease was found but she was found to have a large fungating mass near the splenic flexure with a normal appearing sigmoid.  She has been asymptomatic but before the colonoscopy in since that time.  She denies any fever, chills, nausea, vomiting, hematochezia, melena or abdominal pain.  She only reports a minor amount of reflux that has since resolved with the addition of pantoprazole.  Her only significant past surgical history is  section via lower midline incision.  She denies any family history of colorectal cancer or IBD.  She has had previous colonic polyps on a colonoscopy around 7 years ago.  She reports she is still having normal bowel movements.    5/10/2022: open subtotal colectomy, partial gastrectomy (final path: Stage II T4N0 adenocarcinoma    Interval history:  Since last clinic visit, the patient has done well.  Denies any hematochezia or melena.  Tolerating regular diet without nausea or vomiting.  No abdominal pain.    Review of patient's allergies indicates:  No Known Allergies    Current Outpatient Medications   Medication Sig Dispense Refill    amLODIPine (NORVASC) 5 MG tablet Take 1 tablet (5 mg total) by mouth once daily. 90 tablet 1    ibuprofen (ADVIL,MOTRIN) 800 MG tablet Take 1 tablet (800 mg total) by mouth every 6 (six) hours as needed. 30 tablet 1    montelukast (SINGULAIR) 10 mg tablet Take 1 tablet (10 mg total) by mouth every evening. 90 tablet 1    pantoprazole  (PROTONIX) 40 MG tablet Take 1 tablet (40 mg total) by mouth 2 (two) times daily. 180 tablet 1     No current facility-administered medications for this visit.       Past Medical History:   Diagnosis Date    Anemia, unspecified     Fibroids     GERD (gastroesophageal reflux disease)     History of colon cancer     Hyperlipidemia     Hypertension     Postmenopausal      Past Surgical History:   Procedure Laterality Date    COLONOSCOPY N/A 3/28/2022    Procedure: COLONOSCOPY;  Surgeon: Melanie Ballard MD;  Location: [facility] ENDO;  Service: Endoscopy;  Laterality: N/A;    COLONOSCOPY N/A 6/28/2023    Procedure: COLONOSCOPY;  Surgeon: Maury Liu MD;  Location: [facility];  Service: General;  Laterality: N/A;    ESOPHAGOGASTRODUODENOSCOPY N/A 3/28/2022    Procedure: ESOPHAGOGASTRODUODENOSCOPY (EGD);  Surgeon: Melanie Ballard MD;  Location: [facility];  Service: Endoscopy;  Laterality: N/A;    HEMORRHOID SURGERY      HYSTEROSCOPY WITH DILATION AND CURETTAGE OF UTERUS N/A 5/10/2022    Procedure: HYSTEROSCOPY, WITH DILATION AND CURETTAGE OF UTERUS;  Surgeon: Madelyn West MD;  Location: [facility];  Service: OB/GYN;  Laterality: N/A;    INJECTION OF ANESTHETIC AGENT INTO TISSUE PLANE DEFINED BY TRANSVERSUS ABDOMINIS MUSCLE N/A 5/10/2022    Procedure: BLOCK, TRANSVERSUS ABDOMINIS PLANE;  Surgeon: Maury Liu MD;  Location: [facility];  Service: General;  Laterality: N/A;    LIVER BIOPSY  5/10/2022    Procedure: BIOPSY, LIVER;  Surgeon: Maury Liu MD;  Location: [facility] OR;  Service: General;;    OMENTECTOMY  5/10/2022    Procedure: OMENTECTOMY;  Surgeon: Maury Liu MD;  Location: [facility] OR;  Service: General;;  partial    PARTIAL GASTRECTOMY N/A 5/10/2022    Procedure: GASTRECTOMY, PARTIAL;  Surgeon: Maury Liu MD;  Location: [facility] OR;  Service: General;  Laterality: N/A;    SUBTOTAL COLECTOMY Left 5/10/2022    Procedure: COLECTOMY, PARTIAL;  Surgeon: Maury Liu MD;  Location: [facility];  " Service: General;  Laterality: Left;  LEFT VS SUBTOTAl vs EXTENDED RIGHT     Family History   Problem Relation Name Age of Onset    No Known Problems Mother      No Known Problems Father       Social History     Tobacco Use    Smoking status: Never     Passive exposure: Never    Smokeless tobacco: Never   Substance Use Topics    Alcohol use: Yes     Alcohol/week: 2.0 standard drinks of alcohol     Types: 2 Glasses of wine per week     Comment: occ    Drug use: Never        Review of Systems:  Review of Systems   Constitutional:  Negative for activity change, appetite change, chills, fatigue, fever and unexpected weight change.   HENT:  Negative for congestion, ear pain, sore throat and trouble swallowing.    Eyes:  Negative for pain, redness and itching.   Respiratory:  Negative for cough, shortness of breath and wheezing.    Cardiovascular:  Negative for chest pain, palpitations and leg swelling.   Gastrointestinal:  Negative for abdominal distention, abdominal pain, anal bleeding, blood in stool, constipation, diarrhea, nausea, rectal pain and vomiting.   Endocrine: Negative for cold intolerance, heat intolerance and polyuria.   Genitourinary:  Negative for dysuria, flank pain, frequency and hematuria.   Musculoskeletal:  Negative for gait problem, joint swelling and neck pain.   Skin:  Negative for color change, rash and wound.   Allergic/Immunologic: Negative for environmental allergies and immunocompromised state.   Neurological:  Negative for dizziness, speech difficulty, weakness and numbness.   Psychiatric/Behavioral:  Negative for agitation, confusion and hallucinations.        OBJECTIVE:     Vital Signs (Most Recent)  Temp: 98.1 °F (36.7 °C) (04/17/24 1511)  Pulse: 87 (04/17/24 1511)  BP: (!) 153/91 (04/17/24 1514)  SpO2: 98 % (04/17/24 1511)  5' 10" (1.778 m)  98.9 kg (218 lb)     Physical Exam:  Physical Exam  Constitutional:       Appearance: She is well-developed.   HENT:      Head: Normocephalic and " atraumatic.   Eyes:      Conjunctiva/sclera: Conjunctivae normal.   Neck:      Thyroid: No thyromegaly.   Cardiovascular:      Rate and Rhythm: Normal rate and regular rhythm.   Pulmonary:      Effort: Pulmonary effort is normal. No respiratory distress.   Abdominal:      Comments: Soft, nondistended, nontender; well-healed midline incision   Musculoskeletal:         General: No tenderness. Normal range of motion.      Cervical back: Normal range of motion.   Skin:     General: Skin is warm and dry.      Capillary Refill: Capillary refill takes less than 2 seconds.      Findings: No rash.   Neurological:      Mental Status: She is alert and oriented to person, place, and time.         Laboratory  Lab Results   Component Value Date    WBC 5.14 11/07/2023    HGB 12.6 11/07/2023    HCT 39.5 11/07/2023     11/07/2023    CHOL 263 (H) 05/16/2023    TRIG 65 05/16/2023    HDL 74 05/16/2023    ALT 15 05/16/2023    AST 22 05/16/2023     05/16/2023    K 4.0 05/16/2023     05/16/2023    CREATININE 0.8 11/15/2023    BUN 11 05/16/2023    CO2 26 05/16/2023    TSH 1.710 04/20/2022    HGBA1C 5.5 05/16/2023       Component Ref Range & Units 2 mo ago  (1/22/24) 6 mo ago  (10/11/23) 11 mo ago  (5/22/23) 1 yr ago  (2/13/23) 1 yr ago  (9/12/22) 1 yr ago  (4/20/22)   CEA 0.0 - 5.0 ng/mL 2.4 2.8 CM 1.9 CM 2.2 CM 2.1 CM 3.2 CM       Diagnostic Results:  CT: Reviewed  Colonoscopy: reviewed    CT Nov 2023:  FINDINGS:  CHEST     No infiltrates or pleural effusions are identified.  No discrete pulmonary nodules or masses are identified.  Calcified granuloma noted within the right middle lobe.     The aorta is unremarkable in appearance. There is no pericardial effusion.  No enlarged mediastinal, hilar or axillary lymph nodes are identified.     ABDOMEN     Liver/gallbladder/biliary: The liver demonstrates no focal abnormality. The gallbladder is present and unremarkable.  No biliary ductal dilation.     Pancreas: The pancreas  is unremarkable in appearance.     Spleen: The spleen is not enlarged.     Adrenals: Unremarkable     Kidneys: The kidneys are equally perfused and demonstrate no solid masses. There are at least 4 nonobstructing right renal calculi the largest of which measures approximately 4-5 mm in size.  There is also a 2-3 mm nonobstructing lower pole left renal calculus.     Bowel/Mesentery: There is no evidence of bowel obstruction. There are 2 midline supraumbilical fat filled ventral hernias.  There is also a periumbilical hernia containing a loop of small bowel.  No evidence to suggest a bowel obstruction.  There are postoperative changes seen associated with the colon in the region of the splenic flexure.  Patient appears to be status post right hemicolectomy.  No mesenteric stranding or adenopathy.     Retroperitoneum: No adenopathy.  Vascular calcification seen involving the aorta and iliac arteries.  No aneurysm.     PELVIS     Genitourinary/Reproductive organs: Calcified structure again seen in the expected location of the cervix.     Adenopathy: None     Free Fluid: No free fluid     Osseus Structures/Soft tissues: No suspicious appearing osseous lesions are identified.  There is grade 1 spondylolisthesis of L4 on L5.     Impression:     1. No evidence to suggest recurrent or metastatic disease.  2. Bilateral nonobstructing renal calculi.  Multiple ventral hernias including a periumbilical hernia containing a loop of small bowel.  No evidence to suggest bowel obstruction.  3. Remaining findings as discussed above.    ASSESSMENT/PLAN:     74yo F with colonic adenocarcinoma now s/p subtotal colectomy with partial gastrectomy on 5/10/22 with final path showing Stage II T4bN0 adenocarcinoma with negative margins    - CEA level pending today and q3 months  - CT q6-12 months, ordered for May 2024  - Cscope due in 2026  - RTC 3 months for surveillance    Maury Liu MD  Colon and Rectal Surgery  [facility]  - [place]

## 2024-04-19 ENCOUNTER — LAB VISIT (OUTPATIENT)
Dept: LAB | Facility: HOSPITAL | Age: 74
End: 2024-04-19
Attending: FAMILY MEDICINE
Payer: MEDICARE

## 2024-04-19 ENCOUNTER — OFFICE VISIT (OUTPATIENT)
Dept: FAMILY MEDICINE | Facility: CLINIC | Age: 74
End: 2024-04-19
Attending: FAMILY MEDICINE
Payer: MEDICARE

## 2024-04-19 VITALS
WEIGHT: 225.5 LBS | BODY MASS INDEX: 32.28 KG/M2 | RESPIRATION RATE: 18 BRPM | SYSTOLIC BLOOD PRESSURE: 132 MMHG | HEIGHT: 70 IN | HEART RATE: 80 BPM | DIASTOLIC BLOOD PRESSURE: 80 MMHG | OXYGEN SATURATION: 98 % | TEMPERATURE: 98 F

## 2024-04-19 DIAGNOSIS — R20.8 BURNING SENSATION OF FEET: ICD-10-CM

## 2024-04-19 DIAGNOSIS — M79.605 BILATERAL LEG PAIN: ICD-10-CM

## 2024-04-19 DIAGNOSIS — E66.9 OBESITY (BMI 30.0-34.9): ICD-10-CM

## 2024-04-19 DIAGNOSIS — D50.9 MICROCYTIC ANEMIA: Primary | ICD-10-CM

## 2024-04-19 DIAGNOSIS — D50.9 MICROCYTIC ANEMIA: ICD-10-CM

## 2024-04-19 DIAGNOSIS — M79.604 BILATERAL LEG PAIN: ICD-10-CM

## 2024-04-19 DIAGNOSIS — Z78.0 POSTMENOPAUSAL: ICD-10-CM

## 2024-04-19 DIAGNOSIS — K63.5 BENIGN COLON POLYP: ICD-10-CM

## 2024-04-19 LAB
BASOPHILS # BLD AUTO: 0.02 K/UL (ref 0–0.2)
BASOPHILS NFR BLD: 0.4 % (ref 0–1.9)
DIFFERENTIAL METHOD BLD: NORMAL
EOSINOPHIL # BLD AUTO: 0.1 K/UL (ref 0–0.5)
EOSINOPHIL NFR BLD: 1.4 % (ref 0–8)
ERYTHROCYTE [DISTWIDTH] IN BLOOD BY AUTOMATED COUNT: 14.3 % (ref 11.5–14.5)
FERRITIN SERPL-MCNC: 28 NG/ML (ref 20–300)
HCT VFR BLD AUTO: 39.5 % (ref 37–48.5)
HGB BLD-MCNC: 13 G/DL (ref 12–16)
IMM GRANULOCYTES # BLD AUTO: 0.02 K/UL (ref 0–0.04)
IMM GRANULOCYTES NFR BLD AUTO: 0.4 % (ref 0–0.5)
LYMPHOCYTES # BLD AUTO: 1.9 K/UL (ref 1–4.8)
LYMPHOCYTES NFR BLD: 35.9 % (ref 18–48)
MCH RBC QN AUTO: 30.4 PG (ref 27–31)
MCHC RBC AUTO-ENTMCNC: 32.9 G/DL (ref 32–36)
MCV RBC AUTO: 92 FL (ref 82–98)
MONOCYTES # BLD AUTO: 0.3 K/UL (ref 0.3–1)
MONOCYTES NFR BLD: 6.6 % (ref 4–15)
NEUTROPHILS # BLD AUTO: 2.9 K/UL (ref 1.8–7.7)
NEUTROPHILS NFR BLD: 55.3 % (ref 38–73)
NRBC BLD-RTO: 0 /100 WBC
PLATELET # BLD AUTO: 211 K/UL (ref 150–450)
PMV BLD AUTO: 10.5 FL (ref 9.2–12.9)
RBC # BLD AUTO: 4.28 M/UL (ref 4–5.4)
VIT B12 SERPL-MCNC: >2000 PG/ML (ref 210–950)
WBC # BLD AUTO: 5.15 K/UL (ref 3.9–12.7)

## 2024-04-19 PROCEDURE — 99999 PR PBB SHADOW E&M-EST. PATIENT-LVL IV: CPT | Mod: PBBFAC,,, | Performed by: FAMILY MEDICINE

## 2024-04-19 PROCEDURE — 3008F BODY MASS INDEX DOCD: CPT | Mod: CPTII,S$GLB,, | Performed by: FAMILY MEDICINE

## 2024-04-19 PROCEDURE — 82607 VITAMIN B-12: CPT | Performed by: FAMILY MEDICINE

## 2024-04-19 PROCEDURE — 82728 ASSAY OF FERRITIN: CPT | Performed by: FAMILY MEDICINE

## 2024-04-19 PROCEDURE — 36415 COLL VENOUS BLD VENIPUNCTURE: CPT | Mod: PO | Performed by: FAMILY MEDICINE

## 2024-04-19 PROCEDURE — 85025 COMPLETE CBC W/AUTO DIFF WBC: CPT | Performed by: FAMILY MEDICINE

## 2024-04-19 PROCEDURE — 3075F SYST BP GE 130 - 139MM HG: CPT | Mod: CPTII,S$GLB,, | Performed by: FAMILY MEDICINE

## 2024-04-19 PROCEDURE — 1159F MED LIST DOCD IN RCRD: CPT | Mod: CPTII,S$GLB,, | Performed by: FAMILY MEDICINE

## 2024-04-19 PROCEDURE — 1160F RVW MEDS BY RX/DR IN RCRD: CPT | Mod: CPTII,S$GLB,, | Performed by: FAMILY MEDICINE

## 2024-04-19 PROCEDURE — 1125F AMNT PAIN NOTED PAIN PRSNT: CPT | Mod: CPTII,S$GLB,, | Performed by: FAMILY MEDICINE

## 2024-04-19 PROCEDURE — 3288F FALL RISK ASSESSMENT DOCD: CPT | Mod: CPTII,S$GLB,, | Performed by: FAMILY MEDICINE

## 2024-04-19 PROCEDURE — 3079F DIAST BP 80-89 MM HG: CPT | Mod: CPTII,S$GLB,, | Performed by: FAMILY MEDICINE

## 2024-04-19 PROCEDURE — 1101F PT FALLS ASSESS-DOCD LE1/YR: CPT | Mod: CPTII,S$GLB,, | Performed by: FAMILY MEDICINE

## 2024-04-19 PROCEDURE — 99214 OFFICE O/P EST MOD 30 MIN: CPT | Mod: S$GLB,,, | Performed by: FAMILY MEDICINE

## 2024-04-19 RX ORDER — CYANOCOBALAMIN (VITAMIN B-12) 250 MCG
250 TABLET ORAL DAILY
COMMUNITY

## 2024-04-19 NOTE — PROGRESS NOTES
Ambar Duenas    Chief Complaint   Patient presents with    right hip pain    upper leg(s) tightness    Left foot pain with burning       History of Present Illness:   Ms. Duenas comes in today with complaint of having pain at her right hip and is scheduled to see Dr. Mumtaz Darling, orthopedist, on May 6, 2024 for follow-up.     She also complains of having 1 week tightness at both of her lower legs (calves) without swelling and states tightness resolves with gentle massaging.  She also complains of having occasional pain in her left foot with burning sensation and applies cold compresses with help.    She states she walks without problems.  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  She states she has not been taking iron or vitamins in some time.  She asks for labs for iron and vitamin-D check.  However, vitamin-D is not covered by her insurance, and she declines to have it done.  Likewise, I recommended A1c check for diabetes due to burning sensation in feet; she declines as she stated she has been checked before.    She complains of having occasional pain in her right shoulder with doing exercise.    She denies having associated trauma of any of her joints noted above.    Otherwise, she denies having fever, chills, fatigue, appetite changes; shortness of breath, cough, wheezing; chest pain, palpitations, leg swelling; abdominal pain, nausea, vomiting, diarrhea, constipation; unusual urinary symptoms; polydipsia, polyphagia, polyuria, hot or cold intolerance; back pain; headache; anxiety, depression, homicidal or suicidal thoughts.      She states she is fasting today.    06/28/2023 colonoscopy - One 4 mm polyp at the anastomosis, diverticulosis, hemorrhoids with 3-year repeat advised.        Labs:                       WBC                      5.14                11/07/2023                 HGB                      12.6                11/07/2023                 HCT                      39.5                 11/07/2023                 PLT                      224                 11/07/2023                 CHOL                     263 (H)             05/16/2023                 TRIG                     65                  05/16/2023                 HDL                      74                  05/16/2023                 ALT                      15                  05/16/2023                 AST                      22                  05/16/2023                 NA                       139                 05/16/2023                 K                        4.0                 05/16/2023                 CL                       102                 05/16/2023                 CREATININE               0.8                 11/15/2023                 BUN                      11                  05/16/2023                 CO2                      26                  05/16/2023                 TSH                      1.710               04/20/2022                 HGBA1C                   5.5                 05/16/2023               LDLCALC                  176.0 (H)           05/16/2023          FERRITIN                 16 (L)              11/07/2023                          Current Outpatient Medications   Medication Sig      amLODIPine (NORVASC) 5 MG tablet Take 1 tablet (5 mg total) by mouth once daily.      cyanocobalamin (VITAMIN B-12) 250 MCG tablet Take 250 mcg by mouth once daily.      montelukast (SINGULAIR) 10 mg tablet Take 1 tablet (10 mg total) by mouth every evening. (Patient taking differently: Take 10 mg by mouth nightly as needed.)      pantoprazole (PROTONIX) 40 MG tablet Take 1 tablet (40 mg total) by mouth 2 (two) times daily. (Patient taking differently: Take 40 mg by mouth 2 (two) times daily as needed.)          Review of Systems   Constitutional:  Negative for activity change, appetite change, chills, fatigue and fever.   Respiratory:  Negative for cough, shortness of breath and wheezing.     Cardiovascular:  Negative for chest pain, palpitations and leg swelling.   Gastrointestinal:  Negative for abdominal pain, constipation, diarrhea, nausea and vomiting.   Endocrine: Negative for cold intolerance, heat intolerance, polydipsia, polyphagia and polyuria.   Genitourinary:  Negative for difficulty urinating.   Musculoskeletal:  Positive for myalgias. Negative for back pain.   Neurological:  Positive for numbness. Negative for headaches.   Psychiatric/Behavioral:  Negative for dysphoric mood and suicidal ideas. The patient is not nervous/anxious.         Negative for homicidal ideas.       Objective:  Physical Exam  Vitals reviewed.   Constitutional:       General: She is not in acute distress.     Appearance: Normal appearance. She is obese. She is not ill-appearing, toxic-appearing or diaphoretic.      Comments: Pleasant.   Cardiovascular:      Rate and Rhythm: Normal rate and regular rhythm.      Pulses:           Dorsalis pedis pulses are 3+ on the right side and 3+ on the left side.      Heart sounds: No murmur heard.  Pulmonary:      Effort: Pulmonary effort is normal. No respiratory distress.      Breath sounds: Normal breath sounds. No wheezing.   Abdominal:      General: Bowel sounds are normal. There is no distension.      Palpations: Abdomen is soft. There is no mass.      Tenderness: There is no abdominal tenderness. There is no guarding or rebound.   Musculoskeletal:         General: No swelling or tenderness. Normal range of motion.      Cervical back: Normal range of motion and neck supple. No tenderness.      Comments: She is ambulatory without problems. Non tender hips, legs, feet, shoulders with full range of motion noted.  Negative Romelia's sign at both lower legs.       Feet:      Right foot:      Protective Sensation: 5 sites tested.  5 sites sensed.      Skin integrity: No ulcer or skin breakdown.      Left foot:      Protective Sensation: 5 sites tested.  5 sites sensed.      Skin  integrity: No ulcer or skin breakdown.   Lymphadenopathy:      Cervical: No cervical adenopathy.   Skin:     General: Skin is warm.      Comments: Non tender varicose vein at lateral upper lower leg.   Neurological:      General: No focal deficit present.      Mental Status: She is alert and oriented to person, place, and time.   Psychiatric:         Mood and Affect: Mood normal.         Behavior: Behavior normal.         Thought Content: Thought content normal.         Judgment: Judgment normal.       ASSESSMENT:  1. Microcytic anemia    2. Burning sensation of feet    3. Bilateral leg pain    4. Benign colon polyp    5. Obesity (BMI 30.0-34.9)    6. Postmenopausal        PLAN:  Ambar was seen today for right hip pain, upper leg(s) tightness and left foot pain with burning.    Diagnoses and all orders for this visit:    Microcytic anemia  -     Ferritin; Future  -     CBC Auto Differential; Future    Burning sensation of feet  -     Vitamin B12; Future    Bilateral leg pain    Benign colon polyp    Obesity (BMI 30.0-34.9)    Postmenopausal      Patient advised to call for results.  Continue current medications, follow low sodium, low cholesterol, low carb diet, daily walks.  Okay to take ES Tylenol as directed for pain.  Keep follow up with specialists.  Follow up if symptoms worsen or fail to improve.

## 2024-04-24 PROBLEM — K55.9 VASCULAR DISORDER OF INTESTINE, UNSPECIFIED: Status: RESOLVED | Noted: 2023-05-12 | Resolved: 2024-04-24

## 2024-04-24 PROBLEM — K51.40 PSEUDOPOLYPOSIS OF COLON WITHOUT COMPLICATION, UNSPECIFIED PART OF COLON: Status: RESOLVED | Noted: 2024-04-24 | Resolved: 2024-04-24

## 2024-04-24 PROBLEM — K51.40 PSEUDOPOLYPOSIS OF COLON WITHOUT COMPLICATION, UNSPECIFIED PART OF COLON: Status: ACTIVE | Noted: 2024-04-24

## 2024-05-06 ENCOUNTER — OFFICE VISIT (OUTPATIENT)
Dept: SPORTS MEDICINE | Facility: CLINIC | Age: 74
End: 2024-05-06
Payer: MEDICARE

## 2024-05-06 VITALS — BODY MASS INDEX: 32.28 KG/M2 | HEIGHT: 70 IN | WEIGHT: 225.5 LBS

## 2024-05-06 DIAGNOSIS — M79.652 PAIN IN BOTH THIGHS: ICD-10-CM

## 2024-05-06 DIAGNOSIS — M25.551 BILATERAL HIP PAIN: ICD-10-CM

## 2024-05-06 DIAGNOSIS — M62.89 MUSCLE STIFFNESS: Primary | ICD-10-CM

## 2024-05-06 DIAGNOSIS — M25.552 BILATERAL HIP PAIN: ICD-10-CM

## 2024-05-06 DIAGNOSIS — M79.651 PAIN IN BOTH THIGHS: ICD-10-CM

## 2024-05-06 PROCEDURE — 1101F PT FALLS ASSESS-DOCD LE1/YR: CPT | Mod: CPTII,S$GLB,, | Performed by: STUDENT IN AN ORGANIZED HEALTH CARE EDUCATION/TRAINING PROGRAM

## 2024-05-06 PROCEDURE — 3288F FALL RISK ASSESSMENT DOCD: CPT | Mod: CPTII,S$GLB,, | Performed by: STUDENT IN AN ORGANIZED HEALTH CARE EDUCATION/TRAINING PROGRAM

## 2024-05-06 PROCEDURE — 1125F AMNT PAIN NOTED PAIN PRSNT: CPT | Mod: CPTII,S$GLB,, | Performed by: STUDENT IN AN ORGANIZED HEALTH CARE EDUCATION/TRAINING PROGRAM

## 2024-05-06 PROCEDURE — 99214 OFFICE O/P EST MOD 30 MIN: CPT | Mod: S$GLB,,, | Performed by: STUDENT IN AN ORGANIZED HEALTH CARE EDUCATION/TRAINING PROGRAM

## 2024-05-06 PROCEDURE — 1159F MED LIST DOCD IN RCRD: CPT | Mod: CPTII,S$GLB,, | Performed by: STUDENT IN AN ORGANIZED HEALTH CARE EDUCATION/TRAINING PROGRAM

## 2024-05-06 PROCEDURE — 3008F BODY MASS INDEX DOCD: CPT | Mod: CPTII,S$GLB,, | Performed by: STUDENT IN AN ORGANIZED HEALTH CARE EDUCATION/TRAINING PROGRAM

## 2024-05-06 PROCEDURE — 99999 PR PBB SHADOW E&M-EST. PATIENT-LVL III: CPT | Mod: PBBFAC,,, | Performed by: STUDENT IN AN ORGANIZED HEALTH CARE EDUCATION/TRAINING PROGRAM

## 2024-05-06 NOTE — PROGRESS NOTES
Patient ID: Ambar Duenas  YOB: 1950  MRN: [id number]    Chief Complaint: Pain of the Left Hip, Pain of the Right Hip, and Pain of the Left Lower Leg      History of Present Illness: Ambar Duenas is a 73-year-old female presenting today for follow-up for tight quad hamstring and calf muscles.  No injury fall trauma.  Legs overall feel better and less stiff when she is more active become more stiff over time when she has not.  She denies any falls injuries trauma.  Is not having any specific pain just more stiffness.  Has not done any therapy.  Was seen over year ago for similar concerns.    Past Medical History:   Past Medical History:   Diagnosis Date    Anemia, unspecified     Fibroids     GERD (gastroesophageal reflux disease)     History of colon cancer     Hyperlipidemia     Hypertension     Postmenopausal      Past Surgical History:   Procedure Laterality Date    COLONOSCOPY N/A 3/28/2022    Procedure: COLONOSCOPY;  Surgeon: Melanie Ballard MD;  Location: [facility];  Service: Endoscopy;  Laterality: N/A;    COLONOSCOPY N/A 6/28/2023    Procedure: COLONOSCOPY;  Surgeon: Maury Liu MD;  Location: [facility];  Service: General;  Laterality: N/A;    ESOPHAGOGASTRODUODENOSCOPY N/A 3/28/2022    Procedure: ESOPHAGOGASTRODUODENOSCOPY (EGD);  Surgeon: Melanie Ballard MD;  Location: [facility];  Service: Endoscopy;  Laterality: N/A;    HEMORRHOID SURGERY      HYSTEROSCOPY WITH DILATION AND CURETTAGE OF UTERUS N/A 5/10/2022    Procedure: HYSTEROSCOPY, WITH DILATION AND CURETTAGE OF UTERUS;  Surgeon: Madelyn West MD;  Location: [facility];  Service: OB/GYN;  Laterality: N/A;    INJECTION OF ANESTHETIC AGENT INTO TISSUE PLANE DEFINED BY TRANSVERSUS ABDOMINIS MUSCLE N/A 5/10/2022    Procedure: BLOCK, TRANSVERSUS ABDOMINIS PLANE;  Surgeon: Maury Liu MD;  Location: [facility];  Service: General;  Laterality: N/A;    LIVER BIOPSY  5/10/2022    Procedure: BIOPSY, LIVER;  Surgeon:  Maury Liu MD;  Location: [facility];  Service: General;;    OMENTECTOMY  5/10/2022    Procedure: OMENTECTOMY;  Surgeon: Maury Liu MD;  Location: [facility];  Service: General;;  partial    PARTIAL GASTRECTOMY N/A 5/10/2022    Procedure: GASTRECTOMY, PARTIAL;  Surgeon: Maury Liu MD;  Location: [facility];  Service: General;  Laterality: N/A;    SUBTOTAL COLECTOMY Left 5/10/2022    Procedure: COLECTOMY, PARTIAL;  Surgeon: Maury Liu MD;  Location: [facility];  Service: General;  Laterality: Left;  LEFT VS SUBTOTAl vs EXTENDED RIGHT     Family History   Problem Relation Name Age of Onset    No Known Problems Mother      No Known Problems Father       Social History     Socioeconomic History    Marital status:    Tobacco Use    Smoking status: Never     Passive exposure: Never    Smokeless tobacco: Never   Substance and Sexual Activity    Alcohol use: Yes     Alcohol/week: 2.0 standard drinks of alcohol     Types: 2 Glasses of wine per week     Comment: occ    Drug use: Never    Sexual activity: Not Currently     Partners: Male     Birth control/protection: Post-menopausal     Social Determinants of Health     Physical Activity: Sufficiently Active (11/7/2023)    Exercise Vital Sign     Days of Exercise per Week: 5 days     Minutes of Exercise per Session: 30 min     Medication List with Changes/Refills   Current Medications    AMLODIPINE (NORVASC) 5 MG TABLET    Take 1 tablet (5 mg total) by mouth once daily.    CYANOCOBALAMIN (VITAMIN B-12) 250 MCG TABLET    Take 250 mcg by mouth once daily.    MONTELUKAST (SINGULAIR) 10 MG TABLET    Take 1 tablet (10 mg total) by mouth every evening.    PANTOPRAZOLE (PROTONIX) 40 MG TABLET    Take 1 tablet (40 mg total) by mouth 2 (two) times daily.     Review of patient's allergies indicates:  No Known Allergies    Physical Exam:   Body mass index is 32.36 kg/m².    GENERAL: Well appearing, in no acute distress.  HEAD: Normocephalic and  atraumatic.  ENT: External ears and nose grossly normal.  EYES: EOMI bilaterally  PULMONARY: Respirations are grossly even and non-labored.  NEURO: Awake, alert, and oriented x 3.  SKIN: No obvious rashes appreciated.  PSYCH: Mood & affect are appropriate.    Detailed MSK exam:     Bilateral hip exam negative logroll bilaterally range of motion generally preserved and symmetric SARA on right 2-1/2 fist 1-1/2 fist on the left.  Good strength in all ranges of motion of the hip as well.    Imaging:    No new imaging.     Assessment:  Ambar Duenas is a 73 y.o. female presents today for lower leg pain and stiffness.  Overall hip exam normal again, discussed lower extremity stretching routine was provided today.  Continue with activity and commended her on staying active.  Can follow-up as needed.    Muscle stiffness    Pain in both thighs    Bilateral hip pain           Mumtaz Darling MD    Disclaimer: This note was prepared using a voice recognition system and is likely to have sound alike errors within the text.

## 2024-05-06 NOTE — PATIENT INSTRUCTIONS
Assessment:  Ambar Duenas is a 73 y.o. female   Chief Complaint   Patient presents with    Left Hip - Pain    Right Hip - Pain    Left Lower Leg - Pain       Encounter Diagnoses   Name Primary?    Muscle stiffness Yes    Pain in both thighs     Bilateral hip pain         Plan:       Follow-up: as needed or sooner if there are any problems between now and then.    Thank you for choosing [facility] and Dr. Mumtaz Darling for your orthopedic & sports medicine care. It is our goal to provide you with exceptional care that will help keep you healthy, active, and get you back in the game.    Please do not hesitate to reach out to us via email, phone, or MyChart with any questions, concerns, or feedback.    If you felt that you received exemplary care today, please consider leaving us feedback on [employer] at:  [email or web address]    If you are experiencing pain/discomfort ,or have questions after 5pm and would like to be connected to the [facility] on-call team, please call this number and specify which Sports Medicine provider is treating you: [phone]

## 2024-05-08 ENCOUNTER — OFFICE VISIT (OUTPATIENT)
Dept: FAMILY MEDICINE | Facility: CLINIC | Age: 74
End: 2024-05-08
Attending: FAMILY MEDICINE
Payer: MEDICARE

## 2024-05-08 VITALS
WEIGHT: 225.31 LBS | TEMPERATURE: 99 F | HEART RATE: 96 BPM | BODY MASS INDEX: 32.26 KG/M2 | DIASTOLIC BLOOD PRESSURE: 90 MMHG | OXYGEN SATURATION: 99 % | SYSTOLIC BLOOD PRESSURE: 138 MMHG | HEIGHT: 70 IN | RESPIRATION RATE: 18 BRPM

## 2024-05-08 DIAGNOSIS — I10 PRIMARY HYPERTENSION: ICD-10-CM

## 2024-05-08 DIAGNOSIS — D50.9 MICROCYTIC ANEMIA: ICD-10-CM

## 2024-05-08 DIAGNOSIS — R79.9 ABNORMAL FINDING OF BLOOD CHEMISTRY, UNSPECIFIED: ICD-10-CM

## 2024-05-08 DIAGNOSIS — Z00.00 WELLNESS EXAMINATION: ICD-10-CM

## 2024-05-08 DIAGNOSIS — G62.9 NEUROPATHY: Primary | ICD-10-CM

## 2024-05-08 PROCEDURE — 1101F PT FALLS ASSESS-DOCD LE1/YR: CPT | Mod: CPTII,S$GLB,, | Performed by: NURSE PRACTITIONER

## 2024-05-08 PROCEDURE — 99999 PR PBB SHADOW E&M-EST. PATIENT-LVL III: CPT | Mod: PBBFAC,,, | Performed by: NURSE PRACTITIONER

## 2024-05-08 PROCEDURE — 3080F DIAST BP >= 90 MM HG: CPT | Mod: CPTII,S$GLB,, | Performed by: NURSE PRACTITIONER

## 2024-05-08 PROCEDURE — 3075F SYST BP GE 130 - 139MM HG: CPT | Mod: CPTII,S$GLB,, | Performed by: NURSE PRACTITIONER

## 2024-05-08 PROCEDURE — 1159F MED LIST DOCD IN RCRD: CPT | Mod: CPTII,S$GLB,, | Performed by: NURSE PRACTITIONER

## 2024-05-08 PROCEDURE — 1160F RVW MEDS BY RX/DR IN RCRD: CPT | Mod: CPTII,S$GLB,, | Performed by: NURSE PRACTITIONER

## 2024-05-08 PROCEDURE — 99214 OFFICE O/P EST MOD 30 MIN: CPT | Mod: S$GLB,,, | Performed by: NURSE PRACTITIONER

## 2024-05-08 PROCEDURE — 3008F BODY MASS INDEX DOCD: CPT | Mod: CPTII,S$GLB,, | Performed by: NURSE PRACTITIONER

## 2024-05-08 PROCEDURE — 3288F FALL RISK ASSESSMENT DOCD: CPT | Mod: CPTII,S$GLB,, | Performed by: NURSE PRACTITIONER

## 2024-05-08 RX ORDER — GABAPENTIN 100 MG/1
100 CAPSULE ORAL NIGHTLY
Qty: 30 CAPSULE | Refills: 1 | Status: SHIPPED | OUTPATIENT
Start: 2024-05-08 | End: 2025-05-08

## 2024-05-08 NOTE — PROGRESS NOTES
Ambar Duenas  05/12/2024  [id number]    Lilian Murrieta MD  Patient Care Team:  Lilian Murrieta MD as PCP - General (Family Medicine)  Farooq Limon MD as Consulting Physician (Hematology and Oncology)  Sweta Jaimes NP as Nurse Practitioner (Family Medicine)          Visit Type:a scheduled routine follow-up visit    Chief Complaint:  Chief Complaint   Patient presents with    Follow-up       History of Present Illness:    73 year old female presents for 6 month follow up and repeat labs.   Reports the clinic is too hot for labs and would like to come back another day when fasting.  Requesting medication refill for gabapentin   No other complaints at this time.      History:  Past Medical History:   Diagnosis Date    Anemia, unspecified     Fibroids     GERD (gastroesophageal reflux disease)     History of colon cancer     Hyperlipidemia     Hypertension     Postmenopausal      Past Surgical History:   Procedure Laterality Date    COLONOSCOPY N/A 3/28/2022    Procedure: COLONOSCOPY;  Surgeon: Melanie Ballard MD;  Location: [facility];  Service: Endoscopy;  Laterality: N/A;    COLONOSCOPY N/A 6/28/2023    Procedure: COLONOSCOPY;  Surgeon: Maury Liu MD;  Location: [facility];  Service: General;  Laterality: N/A;    ESOPHAGOGASTRODUODENOSCOPY N/A 3/28/2022    Procedure: ESOPHAGOGASTRODUODENOSCOPY (EGD);  Surgeon: Melanie Ballard MD;  Location: [facility];  Service: Endoscopy;  Laterality: N/A;    HEMORRHOID SURGERY      HYSTEROSCOPY WITH DILATION AND CURETTAGE OF UTERUS N/A 5/10/2022    Procedure: HYSTEROSCOPY, WITH DILATION AND CURETTAGE OF UTERUS;  Surgeon: Madelyn West MD;  Location: [facility];  Service: OB/GYN;  Laterality: N/A;    INJECTION OF ANESTHETIC AGENT INTO TISSUE PLANE DEFINED BY TRANSVERSUS ABDOMINIS MUSCLE N/A 5/10/2022    Procedure: BLOCK, TRANSVERSUS ABDOMINIS PLANE;  Surgeon: Maury Liu MD;  Location: [facility];  Service: General;  Laterality: N/A;     LIVER BIOPSY  5/10/2022    Procedure: BIOPSY, LIVER;  Surgeon: Maury Liu MD;  Location: [facility] OR;  Service: General;;    OMENTECTOMY  5/10/2022    Procedure: OMENTECTOMY;  Surgeon: Maury Liu MD;  Location: [facility] OR;  Service: General;;  partial    PARTIAL GASTRECTOMY N/A 5/10/2022    Procedure: GASTRECTOMY, PARTIAL;  Surgeon: Maury Liu MD;  Location: [facility] OR;  Service: General;  Laterality: N/A;    SUBTOTAL COLECTOMY Left 5/10/2022    Procedure: COLECTOMY, PARTIAL;  Surgeon: Maury Liu MD;  Location: [facility] OR;  Service: General;  Laterality: Left;  LEFT VS SUBTOTAl vs EXTENDED RIGHT     Family History   Problem Relation Name Age of Onset    No Known Problems Mother      No Known Problems Father       Social History     Socioeconomic History    Marital status:    Tobacco Use    Smoking status: Never     Passive exposure: Never    Smokeless tobacco: Never   Substance and Sexual Activity    Alcohol use: Yes     Alcohol/week: 2.0 standard drinks of alcohol     Types: 2 Glasses of wine per week     Comment: occ    Drug use: Never    Sexual activity: Not Currently     Partners: Male     Birth control/protection: Post-menopausal     Social Determinants of Health     Physical Activity: Sufficiently Active (11/7/2023)    Exercise Vital Sign     Days of Exercise per Week: 5 days     Minutes of Exercise per Session: 30 min     Patient Active Problem List   Diagnosis    Microcytic anemia    Thrombocytosis    Malignant neoplasm of sigmoid colon    Primary hypertension    Gross hematuria    Obesity (BMI 30.0-34.9)    Postmenopausal     Review of patient's allergies indicates:  No Known Allergies    The following were reviewed at this visit: active problem list, medication list, allergies, family history, social history, and health maintenance.    Medications:  Current Outpatient Medications on File Prior to Visit   Medication Sig Dispense Refill    amLODIPine (NORVASC) 5 MG tablet Take 1  tablet (5 mg total) by mouth once daily. 90 tablet 1    cyanocobalamin (VITAMIN B-12) 250 MCG tablet Take 250 mcg by mouth once daily.      montelukast (SINGULAIR) 10 mg tablet Take 1 tablet (10 mg total) by mouth every evening. 90 tablet 1    pantoprazole (PROTONIX) 40 MG tablet Take 1 tablet (40 mg total) by mouth 2 (two) times daily. 180 tablet 1     No current facility-administered medications on file prior to visit.       Medications have been reviewed and reconciled with patient at this visit.  Barriers to medications reviewed with patient.    Adverse reactions to current medications reviewed with patient..    Over the counter medications reviewed and reconciled with patient.    Exam:  Wt Readings from Last 3 Encounters:   05/08/24 102.2 kg (225 lb 5 oz)   05/06/24 102.3 kg (225 lb 8.5 oz)   04/19/24 102.3 kg (225 lb 8.5 oz)     Temp Readings from Last 3 Encounters:   05/08/24 99.2 °F (37.3 °C) (Tympanic)   04/19/24 98 °F (36.7 °C) (Tympanic)   04/17/24 98.1 °F (36.7 °C)     BP Readings from Last 3 Encounters:   05/08/24 (!) 138/90   04/19/24 132/80   04/17/24 (!) 153/91     Pulse Readings from Last 3 Encounters:   05/08/24 96   04/19/24 80   04/17/24 87     Body mass index is 32.33 kg/m².      Review of Systems   Constitutional:  Negative for fever.   Respiratory:  Negative for cough, shortness of breath and wheezing.    Cardiovascular:  Negative for chest pain and palpitations.   Gastrointestinal:  Negative for nausea.   Neurological:  Negative for speech change, weakness and headaches.   All other systems reviewed and are negative.    Physical Exam  Vitals and nursing note reviewed.   Constitutional:       Appearance: Normal appearance. She is obese.   HENT:      Head: Normocephalic and atraumatic.      Right Ear: Tympanic membrane, ear canal and external ear normal.      Left Ear: Tympanic membrane, ear canal and external ear normal.      Nose: Nose normal.      Mouth/Throat:      Mouth: Mucous membranes  are moist.      Pharynx: Oropharynx is clear.   Eyes:      Extraocular Movements: Extraocular movements intact.      Conjunctiva/sclera: Conjunctivae normal.      Pupils: Pupils are equal, round, and reactive to light.   Cardiovascular:      Rate and Rhythm: Normal rate and regular rhythm.      Pulses: Normal pulses.      Heart sounds: Normal heart sounds.   Pulmonary:      Effort: Pulmonary effort is normal.      Breath sounds: Normal breath sounds.   Abdominal:      General: Bowel sounds are normal.      Palpations: Abdomen is soft.   Musculoskeletal:         General: Normal range of motion.      Cervical back: Normal range of motion and neck supple.   Skin:     General: Skin is warm and dry.      Capillary Refill: Capillary refill takes less than 2 seconds.   Neurological:      General: No focal deficit present.      Mental Status: She is alert and oriented to person, place, and time.   Psychiatric:         Mood and Affect: Mood normal.         Behavior: Behavior normal.         Thought Content: Thought content normal.         Judgment: Judgment normal.       Laboratory Reviewed ({Yes)  Lab Results   Component Value Date    WBC 5.15 04/19/2024    HGB 13.0 04/19/2024    HCT 39.5 04/19/2024     04/19/2024    CHOL 263 (H) 05/16/2023    TRIG 65 05/16/2023    HDL 74 05/16/2023    ALT 15 05/16/2023    AST 22 05/16/2023     05/16/2023    K 4.0 05/16/2023     05/16/2023    CREATININE 0.8 11/15/2023    BUN 11 05/16/2023    CO2 26 05/16/2023    TSH 1.710 04/20/2022    HGBA1C 5.5 05/16/2023       Ambar was seen today for follow-up.    Diagnoses and all orders for this visit:    Neuropathy  -     gabapentin (NEURONTIN) 100 MG capsule; Take 1 capsule (100 mg total) by mouth every evening.    Wellness examination          Plan  Labs when fasting   Refill gabapentin       Care Plan/Goals: Reviewed    Goals    None     Ambar was seen today for follow-up.    Diagnoses and all orders for this  visit:    Neuropathy  -     gabapentin (NEURONTIN) 100 MG capsule; Take 1 capsule (100 mg total) by mouth every evening.    Wellness examination         Follow up: Follow up for with  for 6 month follow up.    After visit summary was printed and given to patient upon discharge today.  Patient goals and care plan are included in After Visit Summary.

## 2024-05-13 ENCOUNTER — HOSPITAL ENCOUNTER (OUTPATIENT)
Dept: RADIOLOGY | Facility: HOSPITAL | Age: 74
Discharge: HOME | End: 2024-05-13
Attending: COLON & RECTAL SURGERY
Payer: MEDICARE

## 2024-05-13 DIAGNOSIS — Z85.038 PERSONAL HISTORY OF COLON CANCER: ICD-10-CM

## 2024-05-13 PROCEDURE — 74177 CT ABD & PELVIS W/CONTRAST: CPT | Mod: TC

## 2024-05-13 PROCEDURE — 25500020 PHARM REV CODE 255: Performed by: COLON & RECTAL SURGERY

## 2024-05-13 PROCEDURE — A9698 NON-RAD CONTRAST MATERIALNOC: HCPCS | Performed by: COLON & RECTAL SURGERY

## 2024-05-13 PROCEDURE — 71260 CT THORAX DX C+: CPT | Mod: 26,,, | Performed by: RADIOLOGY

## 2024-05-13 PROCEDURE — 74177 CT ABD & PELVIS W/CONTRAST: CPT | Mod: 26,,, | Performed by: RADIOLOGY

## 2024-05-13 RX ADMIN — IOHEXOL 100 ML: 350 INJECTION, SOLUTION INTRAVENOUS at 12:05

## 2024-05-13 RX ADMIN — IOHEXOL 1000 ML: 12 SOLUTION ORAL at 10:05

## 2024-05-17 ENCOUNTER — HOSPITAL ENCOUNTER (OUTPATIENT)
Dept: RADIOLOGY | Facility: HOSPITAL | Age: 74
Discharge: HOME | End: 2024-05-17
Attending: NURSE PRACTITIONER
Payer: MEDICARE

## 2024-05-17 ENCOUNTER — OFFICE VISIT (OUTPATIENT)
Dept: FAMILY MEDICINE | Facility: CLINIC | Age: 74
End: 2024-05-17
Payer: MEDICARE

## 2024-05-17 VITALS
TEMPERATURE: 99 F | HEIGHT: 70 IN | BODY MASS INDEX: 31.78 KG/M2 | DIASTOLIC BLOOD PRESSURE: 70 MMHG | SYSTOLIC BLOOD PRESSURE: 136 MMHG | WEIGHT: 222 LBS | OXYGEN SATURATION: 98 % | HEART RATE: 89 BPM

## 2024-05-17 DIAGNOSIS — M79.671 PAIN IN BOTH FEET: ICD-10-CM

## 2024-05-17 DIAGNOSIS — M25.552 BILATERAL HIP PAIN: ICD-10-CM
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
## 2024-05-17 DIAGNOSIS — M79.672 PAIN IN BOTH FEET: ICD-10-CM

## 2024-05-17 DIAGNOSIS — M25.511 ACUTE PAIN OF BOTH SHOULDERS: ICD-10-CM

## 2024-05-17 DIAGNOSIS — M25.512 ACUTE PAIN OF BOTH SHOULDERS: ICD-10-CM

## 2024-05-17 DIAGNOSIS — M25.551 BILATERAL HIP PAIN: ICD-10-CM

## 2024-05-17 PROCEDURE — 73630 X-RAY EXAM OF FOOT: CPT | Mod: TC,FY,PO,RT

## 2024-05-17 PROCEDURE — 73630 X-RAY EXAM OF FOOT: CPT | Mod: 26,LT,, | Performed by: RADIOLOGY

## 2024-05-17 PROCEDURE — 3044F HG A1C LEVEL LT 7.0%: CPT | Mod: CPTII,S$GLB,, | Performed by: NURSE PRACTITIONER

## 2024-05-17 PROCEDURE — 3288F FALL RISK ASSESSMENT DOCD: CPT | Mod: CPTII,S$GLB,, | Performed by: NURSE PRACTITIONER

## 2024-05-17 PROCEDURE — 1126F AMNT PAIN NOTED NONE PRSNT: CPT | Mod: CPTII,S$GLB,, | Performed by: NURSE PRACTITIONER

## 2024-05-17 PROCEDURE — 3008F BODY MASS INDEX DOCD: CPT | Mod: CPTII,S$GLB,, | Performed by: NURSE PRACTITIONER

## 2024-05-17 PROCEDURE — 73630 X-RAY EXAM OF FOOT: CPT | Mod: 26,RT,, | Performed by: RADIOLOGY

## 2024-05-17 PROCEDURE — 3075F SYST BP GE 130 - 139MM HG: CPT | Mod: CPTII,S$GLB,, | Performed by: NURSE PRACTITIONER

## 2024-05-17 PROCEDURE — 96372 THER/PROPH/DIAG INJ SC/IM: CPT | Mod: S$GLB,,, | Performed by: NURSE PRACTITIONER

## 2024-05-17 PROCEDURE — 99999 PR PBB SHADOW E&M-EST. PATIENT-LVL V: CPT | Mod: PBBFAC,,, | Performed by: NURSE PRACTITIONER

## 2024-05-17 PROCEDURE — 73630 X-RAY EXAM OF FOOT: CPT | Mod: TC,FY,PO,LT

## 2024-05-17 PROCEDURE — 99214 OFFICE O/P EST MOD 30 MIN: CPT | Mod: 25,S$GLB,, | Performed by: NURSE PRACTITIONER

## 2024-05-17 PROCEDURE — 1101F PT FALLS ASSESS-DOCD LE1/YR: CPT | Mod: CPTII,S$GLB,, | Performed by: NURSE PRACTITIONER

## 2024-05-17 PROCEDURE — 3078F DIAST BP <80 MM HG: CPT | Mod: CPTII,S$GLB,, | Performed by: NURSE PRACTITIONER

## 2024-05-17 RX ORDER — KETOROLAC TROMETHAMINE 30 MG/ML
60 INJECTION, SOLUTION INTRAMUSCULAR; INTRAVENOUS
Status: COMPLETED | OUTPATIENT
Start: 2024-05-17 | End: 2024-05-17

## 2024-05-17 RX ORDER — CYCLOBENZAPRINE HCL 10 MG
10 TABLET ORAL NIGHTLY
Qty: 10 TABLET | Refills: 0 | Status: SHIPPED | OUTPATIENT
Start: 2024-05-17 | End: 2024-05-27

## 2024-05-17 RX ADMIN — KETOROLAC TROMETHAMINE 60 MG: 30 INJECTION, SOLUTION INTRAMUSCULAR; INTRAVENOUS at 03:05

## 2024-05-17 NOTE — PROGRESS NOTES
Ambar Duenas  05/28/2024  [id number]    Lilian Murrieta MD  Patient Care Team:  Lilian Murrieta MD as PCP - General (Family Medicine)  Farooq Limon MD as Consulting Physician (Hematology and Oncology)  Sweta Jaimes NP as Nurse Practitioner (Family Medicine)          Visit Type:an urgent visit for a new problem    Chief Complaint:  Chief Complaint   Patient presents with    Fall       History of Present Illness:    74 yo female presents today with co ground level fall after accidentally missing a step while walking down her stairs. Pt reports shoulder pain that has subsided.  Pt is requesting bilateral feet and hip xrays. Reports she is following up with her ortho and he is requesting xrays   Pt is requesting to follow up with podiatry   History:  Past Medical History:   Diagnosis Date    Anemia, unspecified     Fibroids     GERD (gastroesophageal reflux disease)     History of colon cancer     Hyperlipidemia     Hypertension     Postmenopausal      Past Surgical History:   Procedure Laterality Date    COLONOSCOPY N/A 3/28/2022    Procedure: COLONOSCOPY;  Surgeon: Melanie Ballard MD;  Location: [facility] ENDO;  Service: Endoscopy;  Laterality: N/A;    COLONOSCOPY N/A 6/28/2023    Procedure: COLONOSCOPY;  Surgeon: Maury Liu MD;  Location: [facility] ENDO;  Service: General;  Laterality: N/A;    ESOPHAGOGASTRODUODENOSCOPY N/A 3/28/2022    Procedure: ESOPHAGOGASTRODUODENOSCOPY (EGD);  Surgeon: Melanie Ballard MD;  Location: [facility];  Service: Endoscopy;  Laterality: N/A;    HEMORRHOID SURGERY      HYSTEROSCOPY WITH DILATION AND CURETTAGE OF UTERUS N/A 5/10/2022    Procedure: HYSTEROSCOPY, WITH DILATION AND CURETTAGE OF UTERUS;  Surgeon: Madelyn West MD;  Location: [facility] OR;  Service: OB/GYN;  Laterality: N/A;    INJECTION OF ANESTHETIC AGENT INTO TISSUE PLANE DEFINED BY TRANSVERSUS ABDOMINIS MUSCLE N/A 5/10/2022    Procedure: BLOCK, TRANSVERSUS ABDOMINIS PLANE;  Surgeon: Maury MENCHACA  MD Noe;  Location: [facility];  Service: General;  Laterality: N/A;    LIVER BIOPSY  5/10/2022    Procedure: BIOPSY, LIVER;  Surgeon: Maury Liu MD;  Location: [facility] [place] OR;  Service: General;;    OMENTECTOMY  5/10/2022    Procedure: OMENTECTOMY;  Surgeon: Maury Liu MD;  Location: [facility] [place] [facility];  Service: General;;  partial    PARTIAL GASTRECTOMY N/A 5/10/2022    Procedure: GASTRECTOMY, PARTIAL;  Surgeon: Maury Liu MD;  Location: [facility] [place] [facility];  Service: General;  Laterality: N/A;    SUBTOTAL COLECTOMY Left 5/10/2022    Procedure: COLECTOMY, PARTIAL;  Surgeon: Maury Liu MD;  Location: [facility];  Service: General;  Laterality: Left;  LEFT VS SUBTOTAl vs EXTENDED RIGHT     Family History   Problem Relation Name Age of Onset    No Known Problems Mother      No Known Problems Father       Social History     Socioeconomic History    Marital status:    Tobacco Use    Smoking status: Never     Passive exposure: Never    Smokeless tobacco: Never   Substance and Sexual Activity    Alcohol use: Yes     Alcohol/week: 2.0 standard drinks of alcohol     Types: 2 Glasses of wine per week     Comment: occ    Drug use: Never    Sexual activity: Not Currently     Partners: Male     Birth control/protection: Post-menopausal     Social Determinants of Health     Physical Activity: Sufficiently Active (11/7/2023)    Exercise Vital Sign     Days of Exercise per Week: 5 days     Minutes of Exercise per Session: 30 min     Patient Active Problem List   Diagnosis    Microcytic anemia    Thrombocytosis    Malignant neoplasm of sigmoid colon    Primary hypertension    Gross hematuria    Obesity (BMI 30.0-34.9)    Postmenopausal     Review of patient's allergies indicates:  No Known Allergies    The following were reviewed at this visit: active problem list, medication list, allergies, family history, social history, and health maintenance.    Medications:  Current Outpatient Medications on File Prior to Visit    Medication Sig Dispense Refill    amLODIPine (NORVASC) 5 MG tablet Take 1 tablet (5 mg total) by mouth once daily. 90 tablet 1    cyanocobalamin (VITAMIN B-12) 250 MCG tablet Take 250 mcg by mouth once daily.      gabapentin (NEURONTIN) 100 MG capsule Take 1 capsule (100 mg total) by mouth every evening. 30 capsule 1    montelukast (SINGULAIR) 10 mg tablet Take 1 tablet (10 mg total) by mouth every evening. 90 tablet 1    pantoprazole (PROTONIX) 40 MG tablet Take 1 tablet (40 mg total) by mouth 2 (two) times daily. 180 tablet 1     No current facility-administered medications on file prior to visit.       Medications have been reviewed and reconciled with patient at this visit.  Barriers to medications reviewed with patient.    Adverse reactions to current medications reviewed with patient..    Over the counter medications reviewed and reconciled with patient.    Exam:  Wt Readings from Last 3 Encounters:   05/23/24 100.7 kg (222 lb 0.1 oz)   05/17/24 100.7 kg (222 lb 0.1 oz)   05/08/24 102.2 kg (225 lb 5 oz)     Temp Readings from Last 3 Encounters:   05/17/24 98.9 °F (37.2 °C) (Tympanic)   05/08/24 99.2 °F (37.3 °C) (Tympanic)   04/19/24 98 °F (36.7 °C) (Tympanic)     BP Readings from Last 3 Encounters:   05/17/24 136/70   05/08/24 (!) 138/90   04/19/24 132/80     Pulse Readings from Last 3 Encounters:   05/17/24 89   05/08/24 96   04/19/24 80     Body mass index is 31.85 kg/m².      Review of Systems   Constitutional:  Negative for fever.   Respiratory:  Negative for cough, shortness of breath and wheezing.    Cardiovascular:  Negative for chest pain and palpitations.   Gastrointestinal:  Negative for nausea.   Neurological:  Negative for speech change, weakness and headaches.   All other systems reviewed and are negative.    Physical Exam  Vitals and nursing note reviewed.   Constitutional:       Appearance: Normal appearance. She is obese.   HENT:      Head: Normocephalic and atraumatic.      Right Ear:  Tympanic membrane, ear canal and external ear normal.      Left Ear: Tympanic membrane, ear canal and external ear normal.      Nose: Nose normal.      Mouth/Throat:      Mouth: Mucous membranes are moist.      Pharynx: Oropharynx is clear.   Eyes:      Extraocular Movements: Extraocular movements intact.      Conjunctiva/sclera: Conjunctivae normal.      Pupils: Pupils are equal, round, and reactive to light.   Cardiovascular:      Rate and Rhythm: Normal rate and regular rhythm.      Pulses: Normal pulses.      Heart sounds: Normal heart sounds.   Pulmonary:      Effort: Pulmonary effort is normal.      Breath sounds: Normal breath sounds.   Abdominal:      General: Bowel sounds are normal.      Palpations: Abdomen is soft.   Musculoskeletal:         General: Normal range of motion.      Cervical back: Normal range of motion and neck supple.   Feet:      Right foot:      Protective Sensation: 10 sites tested.  10 sites sensed.      Left foot:      Protective Sensation: 10 sites tested.  10 sites sensed.   Skin:     General: Skin is warm and dry.      Capillary Refill: Capillary refill takes less than 2 seconds.   Neurological:      General: No focal deficit present.      Mental Status: She is alert and oriented to person, place, and time.   Psychiatric:         Mood and Affect: Mood normal.         Behavior: Behavior normal.         Thought Content: Thought content normal.         Judgment: Judgment normal.         Laboratory Reviewed ({Yes)  Lab Results   Component Value Date    WBC 4.80 05/23/2024    HGB 12.9 05/23/2024    HCT 40.3 05/23/2024     05/23/2024    CHOL 253 (H) 05/23/2024    TRIG 45 05/23/2024    HDL 63 05/23/2024    ALT 11 05/23/2024    AST 17 05/23/2024     05/23/2024    K 3.6 05/23/2024     05/23/2024    CREATININE 0.8 05/23/2024    BUN 17 05/23/2024    CO2 25 05/23/2024    TSH 0.579 05/23/2024    HGBA1C 5.7 (H) 05/23/2024       Ambar was seen today for fall.    Diagnoses and  all orders for this visit:    Fall, initial encounter    Bilateral hip pain  -     ketorolac injection 60 mg  -     X-Ray Hips Bilateral 2 View Incl AP Pelvis; Future    Acute pain of both shoulders  -     cyclobenzaprine (FLEXERIL) 10 MG tablet; Take 1 tablet (10 mg total) by mouth every evening. for 10 days    Pain in both feet  -     Ambulatory referral/consult to Podiatry; Future  -     X-Ray Foot Complete Right; Future  -     X-Ray Foot Complete Left; Future        Plan   Xrays  F/u podiatry   IM ketorlac   Start medications prescribed today         Care Plan/Goals: Reviewed    Goals    None       Ambar was seen today for fall.    Diagnoses and all orders for this visit:    Fall, initial encounter    Bilateral hip pain  -     ketorolac injection 60 mg  -     X-Ray Hips Bilateral 2 View Incl AP Pelvis; Future    Acute pain of both shoulders  -     cyclobenzaprine (FLEXERIL) 10 MG tablet; Take 1 tablet (10 mg total) by mouth every evening. for 10 days    Pain in both feet  -     Ambulatory referral/consult to Podiatry; Future  -     X-Ray Foot Complete Right; Future  -     X-Ray Foot Complete Left; Future       Follow up: Follow up for with  for 6 month follow up.    After visit summary was printed and given to patient upon discharge today.  Patient goals and care plan are included in After Visit Summary.

## 2024-05-23 ENCOUNTER — OFFICE VISIT (OUTPATIENT)
Dept: PODIATRY | Facility: CLINIC | Age: 74
End: 2024-05-23
Payer: MEDICARE

## 2024-05-23 ENCOUNTER — HOSPITAL ENCOUNTER (OUTPATIENT)
Dept: RADIOLOGY | Facility: HOSPITAL | Age: 74
Discharge: HOME | End: 2024-05-23
Attending: NURSE PRACTITIONER
Payer: MEDICARE

## 2024-05-23 VITALS — HEIGHT: 70 IN | BODY MASS INDEX: 31.78 KG/M2 | WEIGHT: 222 LBS

## 2024-05-23 DIAGNOSIS — M25.552 BILATERAL HIP PAIN: ICD-10-CM

## 2024-05-23 DIAGNOSIS — M79.671 PAIN IN BOTH FEET: ICD-10-CM

## 2024-05-23 DIAGNOSIS — M25.551 BILATERAL HIP PAIN: ICD-10-CM

## 2024-05-23 DIAGNOSIS — G60.9 IDIOPATHIC PERIPHERAL NEUROPATHY: Primary | ICD-10-CM

## 2024-05-23 DIAGNOSIS — M79.672 PAIN IN BOTH FEET: ICD-10-CM

## 2024-05-23 PROCEDURE — 73521 X-RAY EXAM HIPS BI 2 VIEWS: CPT | Mod: TC

## 2024-05-23 PROCEDURE — 3008F BODY MASS INDEX DOCD: CPT | Mod: CPTII,S$GLB,, | Performed by: PODIATRIST

## 2024-05-23 PROCEDURE — 73521 X-RAY EXAM HIPS BI 2 VIEWS: CPT | Mod: 26,,, | Performed by: RADIOLOGY

## 2024-05-23 PROCEDURE — 1101F PT FALLS ASSESS-DOCD LE1/YR: CPT | Mod: CPTII,S$GLB,, | Performed by: PODIATRIST

## 2024-05-23 PROCEDURE — 3044F HG A1C LEVEL LT 7.0%: CPT | Mod: CPTII,S$GLB,, | Performed by: PODIATRIST

## 2024-05-23 PROCEDURE — 1159F MED LIST DOCD IN RCRD: CPT | Mod: CPTII,S$GLB,, | Performed by: PODIATRIST

## 2024-05-23 PROCEDURE — 99203 OFFICE O/P NEW LOW 30 MIN: CPT | Mod: S$GLB,,, | Performed by: PODIATRIST

## 2024-05-23 PROCEDURE — 3288F FALL RISK ASSESSMENT DOCD: CPT | Mod: CPTII,S$GLB,, | Performed by: PODIATRIST

## 2024-05-23 PROCEDURE — 99999 PR PBB SHADOW E&M-EST. PATIENT-LVL III: CPT | Mod: PBBFAC,,, | Performed by: PODIATRIST

## 2024-05-23 PROCEDURE — 1125F AMNT PAIN NOTED PAIN PRSNT: CPT | Mod: CPTII,S$GLB,, | Performed by: PODIATRIST

## 2024-05-23 NOTE — PROGRESS NOTES
Podiatry Department    Patient ID: Ambar Duenas is a 73 y.o. female.    Chief Complaint: Foot Pain (C/o nerve pain in both feet, plantar aspect, rates pain 2/10, x-rays on 05/17, prescribed gabapentin hasn't started it yet, non-diabetic, wears casual shoes)    History of Present Illness    CHIEF COMPLAINT:  Patient presents today for nerve pains in her feet.    PERIPHERAL NEUROPATHY:  Patient reports sporadic episodes of burning sensation in her feet during the daytime. Physical exam reveals palpable pedal pulses and the patient is able to feel all 10 sites as tested with a [employer] monofilament. No open skin lesions are noted and the webspace is clean, dry, and intact. Pronated foot type is noted bilaterally and muscle strength is 5/5 for all muscle groups tested. She has been prescribed gabapentin for symptoms but has not started the medication yet.    HIP PROBLEM:  The patient also mentions having a hip problem.            Physical Exam    Cardiovascular: Palpable pedal pulses.  Neurological: Able to feel all 10 sites as tested with a [employer] monofilament.  Skin: No open skin lesions. Webspace is clean, dry, and intact.  Musculoskeletal: Pronated foot type noted bilateral. Muscle strength 5/5 for all muscle groups tested.           Diagnoses:  1. Idiopathic peripheral neuropathy    2. Pain in both feet  -     Ambulatory referral/consult to Podiatry        Assessment & Plan    NEUROPATHY OR NERVE PARESTHESIAS:  - Explained to the patient that symptoms could indicate neuropathy or nerve paresthesias, potentially resulting from lower back or hip issues.  - Highlighted that numbness, burning, and tingling sensations in the feet are common signs of these conditions.  - Prescribed gabapentin for the patient to alleviate these symptoms as they occur.              Future Appointments   Date Time Provider Department Center   7/17/2024  1:20 PM LIANA JACOBS [facility]   7/17/2024  1:40  PM Maury Liu MD [facility]   10/16/2024  2:00 PM LIANA JACOBS [facility]        This note was generated with the assistance of ambient listening technology. Verbal consent was obtained by the patient and accompanying visitor(s) for the recording of patient appointment to facilitate this note. I attest to having reviewed and edited the generated note for accuracy, though some syntax or spelling errors may persist. Please contact the author of this note for any clarification.      Report Electronically Signed By:     Rylee Kilgore DPM   Podiatry  [facility]  5/24/2024

## 2024-06-24 ENCOUNTER — TELEPHONE (OUTPATIENT)
Dept: DERMATOLOGY | Facility: CLINIC | Age: 74
End: 2024-06-24
Payer: MEDICARE

## 2024-06-24 NOTE — TELEPHONE ENCOUNTER
Called pt regarding scheduling. No answer.LVM     ----- Message from Sarkis Shipman sent at 6/24/2024 11:20 AM CDT -----  Contact: patient  Type:  Sooner Apoointment Request    Caller is requesting a sooner appointment.  Caller declined first available appointment listed below.  Caller will not accept being placed on the waitlist and is requesting a message be sent to doctor.  Name of Caller:Ambar Duenas   When is the first available appointment? 8/28/2024   Symptoms: Dye not coming off of her face   Would the patient rather a call back or a response via [facility]?  call  Best Call Back Number: [phone]   Additional Information:

## 2024-07-17 ENCOUNTER — LAB VISIT (OUTPATIENT)
Dept: LAB | Facility: HOSPITAL | Age: 74
End: 2024-07-17
Attending: COLON & RECTAL SURGERY
Payer: MEDICARE

## 2024-07-17 ENCOUNTER — OFFICE VISIT (OUTPATIENT)
Dept: SURGERY | Facility: CLINIC | Age: 74
End: 2024-07-17
Payer: MEDICARE

## 2024-07-17 VITALS
TEMPERATURE: 98 F | BODY MASS INDEX: 31.24 KG/M2 | WEIGHT: 217.69 LBS | DIASTOLIC BLOOD PRESSURE: 88 MMHG | SYSTOLIC BLOOD PRESSURE: 158 MMHG | HEART RATE: 90 BPM

## 2024-07-17 DIAGNOSIS — Z85.038 PERSONAL HISTORY OF COLON CANCER: Primary | ICD-10-CM

## 2024-07-17 DIAGNOSIS — Z85.038 PERSONAL HISTORY OF COLON CANCER: ICD-10-CM

## 2024-07-17 PROCEDURE — 36415 COLL VENOUS BLD VENIPUNCTURE: CPT | Performed by: COLON & RECTAL SURGERY

## 2024-07-17 PROCEDURE — 3079F DIAST BP 80-89 MM HG: CPT | Mod: CPTII,S$GLB,, | Performed by: COLON & RECTAL SURGERY

## 2024-07-17 PROCEDURE — 1126F AMNT PAIN NOTED NONE PRSNT: CPT | Mod: CPTII,S$GLB,, | Performed by: COLON & RECTAL SURGERY

## 2024-07-17 PROCEDURE — 3044F HG A1C LEVEL LT 7.0%: CPT | Mod: CPTII,S$GLB,, | Performed by: COLON & RECTAL SURGERY

## 2024-07-17 PROCEDURE — 99999 PR PBB SHADOW E&M-EST. PATIENT-LVL III: CPT | Mod: PBBFAC,,, | Performed by: COLON & RECTAL SURGERY

## 2024-07-17 PROCEDURE — 99214 OFFICE O/P EST MOD 30 MIN: CPT | Mod: S$GLB,,, | Performed by: COLON & RECTAL SURGERY

## 2024-07-17 PROCEDURE — 3077F SYST BP >= 140 MM HG: CPT | Mod: CPTII,S$GLB,, | Performed by: COLON & RECTAL SURGERY

## 2024-07-17 PROCEDURE — 1159F MED LIST DOCD IN RCRD: CPT | Mod: CPTII,S$GLB,, | Performed by: COLON & RECTAL SURGERY

## 2024-07-17 PROCEDURE — 3008F BODY MASS INDEX DOCD: CPT | Mod: CPTII,S$GLB,, | Performed by: COLON & RECTAL SURGERY

## 2024-07-17 PROCEDURE — 82378 CARCINOEMBRYONIC ANTIGEN: CPT | Performed by: COLON & RECTAL SURGERY

## 2024-07-17 NOTE — PROGRESS NOTES
History & Physical    SUBJECTIVE:     Chief Complaint   Patient presents with    Follow-up     2 month follow up    Colon Cancer     Ref: Melanie Ballard MD    History of Present Illness:  Patient is a 73 y.o. female presents forEvaluation of colonic adenocarcinoma.  Patient was undergoing surveillance colonoscopy in 2022 where she was found to have a almost completely obstructing colon mass that was biopsied and resulted as adenocarcinoma.  This was initially thought to be at the sigmoid colon upon imaging with CT scan of the chest abdomen pelvis, no metastatic disease was found but she was found to have a large fungating mass near the splenic flexure with a normal appearing sigmoid.  She has been asymptomatic but before the colonoscopy in since that time.  She denies any fever, chills, nausea, vomiting, hematochezia, melena or abdominal pain.  She only reports a minor amount of reflux that has since resolved with the addition of pantoprazole.  Her only significant past surgical history is  section via lower midline incision.  She denies any family history of colorectal cancer or IBD.  She has had previous colonic polyps on a colonoscopy around 7 years ago.  She reports she is still having normal bowel movements.    5/10/2022: open subtotal colectomy, partial gastrectomy (final path: Stage II T4N0 adenocarcinoma    Interval history:  Since last clinic visit, the patient continues to do well.  Denies any hematochezia or melena.  Tolerating regular diet without nausea or vomiting.  No abdominal pain.    Review of patient's allergies indicates:  No Known Allergies    Current Outpatient Medications   Medication Sig Dispense Refill    amLODIPine (NORVASC) 5 MG tablet Take 1 tablet (5 mg total) by mouth once daily. 90 tablet 1    cyanocobalamin (VITAMIN B-12) 250 MCG tablet Take 250 mcg by mouth once daily.      gabapentin (NEURONTIN) 100 MG capsule Take 1 capsule (100 mg total) by mouth every evening. 30  capsule 1    montelukast (SINGULAIR) 10 mg tablet Take 1 tablet (10 mg total) by mouth every evening. 90 tablet 1    pantoprazole (PROTONIX) 40 MG tablet Take 1 tablet (40 mg total) by mouth 2 (two) times daily. 180 tablet 1     No current facility-administered medications for this visit.       Past Medical History:   Diagnosis Date    Anemia, unspecified     Fibroids     GERD (gastroesophageal reflux disease)     History of colon cancer     Hyperlipidemia     Hypertension     Postmenopausal      Past Surgical History:   Procedure Laterality Date    COLONOSCOPY N/A 3/28/2022    Procedure: COLONOSCOPY;  Surgeon: Melanie Ballard MD;  Location: [facility];  Service: Endoscopy;  Laterality: N/A;    COLONOSCOPY N/A 6/28/2023    Procedure: COLONOSCOPY;  Surgeon: Maury Liu MD;  Location: [facility];  Service: General;  Laterality: N/A;    ESOPHAGOGASTRODUODENOSCOPY N/A 3/28/2022    Procedure: ESOPHAGOGASTRODUODENOSCOPY (EGD);  Surgeon: Melanie Ballard MD;  Location: [facility];  Service: Endoscopy;  Laterality: N/A;    HEMORRHOID SURGERY      HYSTEROSCOPY WITH DILATION AND CURETTAGE OF UTERUS N/A 5/10/2022    Procedure: HYSTEROSCOPY, WITH DILATION AND CURETTAGE OF UTERUS;  Surgeon: Madelyn West MD;  Location: [facility];  Service: OB/GYN;  Laterality: N/A;    INJECTION OF ANESTHETIC AGENT INTO TISSUE PLANE DEFINED BY TRANSVERSUS ABDOMINIS MUSCLE N/A 5/10/2022    Procedure: BLOCK, TRANSVERSUS ABDOMINIS PLANE;  Surgeon: Maury Liu MD;  Location: [facility];  Service: General;  Laterality: N/A;    LIVER BIOPSY  5/10/2022    Procedure: BIOPSY, LIVER;  Surgeon: Maury Liu MD;  Location: [facility] OR;  Service: General;;    OMENTECTOMY  5/10/2022    Procedure: OMENTECTOMY;  Surgeon: Maury Liu MD;  Location: [facility] OR;  Service: General;;  partial    PARTIAL GASTRECTOMY N/A 5/10/2022    Procedure: GASTRECTOMY, PARTIAL;  Surgeon: Maury Liu MD;  Location: [facility] OR;  Service: General;   Laterality: N/A;    SUBTOTAL COLECTOMY Left 5/10/2022    Procedure: COLECTOMY, PARTIAL;  Surgeon: Maury Liu MD;  Location: [facility];  Service: General;  Laterality: Left;  LEFT VS SUBTOTAl vs EXTENDED RIGHT     Family History   Problem Relation Name Age of Onset    No Known Problems Mother      No Known Problems Father       Social History     Tobacco Use    Smoking status: Never     Passive exposure: Never    Smokeless tobacco: Never   Substance Use Topics    Alcohol use: Yes     Alcohol/week: 2.0 standard drinks of alcohol     Types: 2 Glasses of wine per week     Comment: occ    Drug use: Never        Review of Systems:  Review of Systems   Constitutional:  Negative for activity change, appetite change, chills, fatigue, fever and unexpected weight change.   HENT:  Negative for congestion, ear pain, sore throat and trouble swallowing.    Eyes:  Negative for pain, redness and itching.   Respiratory:  Negative for cough, shortness of breath and wheezing.    Cardiovascular:  Negative for chest pain, palpitations and leg swelling.   Gastrointestinal:  Negative for abdominal distention, abdominal pain, anal bleeding, blood in stool, constipation, diarrhea, nausea, rectal pain and vomiting.   Endocrine: Negative for cold intolerance, heat intolerance and polyuria.   Genitourinary:  Negative for dysuria, flank pain, frequency and hematuria.   Musculoskeletal:  Negative for gait problem, joint swelling and neck pain.   Skin:  Negative for color change, rash and wound.   Allergic/Immunologic: Negative for environmental allergies and immunocompromised state.   Neurological:  Negative for dizziness, speech difficulty, weakness and numbness.   Psychiatric/Behavioral:  Negative for agitation, confusion and hallucinations.        OBJECTIVE:     Vital Signs (Most Recent)  Temp: 98.3 °F (36.8 °C) (07/17/24 1357)  Pulse: 90 (07/17/24 1357)  BP: (!) 158/88 (07/17/24 1357)     98.8 kg (217 lb 11.3 oz)     Physical  Exam:  Physical Exam  Constitutional:       Appearance: She is well-developed.   HENT:      Head: Normocephalic and atraumatic.   Eyes:      Conjunctiva/sclera: Conjunctivae normal.   Neck:      Thyroid: No thyromegaly.   Cardiovascular:      Rate and Rhythm: Normal rate and regular rhythm.   Pulmonary:      Effort: Pulmonary effort is normal. No respiratory distress.   Abdominal:      Comments: Soft, nondistended, nontender; well-healed midline incision   Musculoskeletal:         General: No tenderness. Normal range of motion.      Cervical back: Normal range of motion.   Skin:     General: Skin is warm and dry.      Capillary Refill: Capillary refill takes less than 2 seconds.      Findings: No rash.   Neurological:      Mental Status: She is alert and oriented to person, place, and time.         Laboratory  Lab Results   Component Value Date    WBC 4.80 05/23/2024    HGB 12.9 05/23/2024    HCT 40.3 05/23/2024     05/23/2024    CHOL 253 (H) 05/23/2024    TRIG 45 05/23/2024    HDL 63 05/23/2024    ALT 11 05/23/2024    AST 17 05/23/2024     05/23/2024    K 3.6 05/23/2024     05/23/2024    CREATININE 0.8 05/23/2024    BUN 17 05/23/2024    CO2 25 05/23/2024    TSH 0.579 05/23/2024    HGBA1C 5.7 (H) 05/23/2024                  Component Ref Range & Units 3 mo ago  (4/17/24) 5 mo ago  (1/22/24) 9 mo ago  (10/11/23) 1 yr ago  (5/22/23) 1 yr ago  (2/13/23) 1 yr ago  (9/12/22) 2 yr ago  (4/20/22)   CEA 0.0 - 5.0 ng/mL 2.4 2.4 CM 2.8 CM 1.9 CM 2.2 CM 2.1 CM 3.2 CM          Diagnostic Results:  CT: Reviewed  Colonoscopy: reviewed    CT May 2024:  FINDINGS:  CT chest:  Heart size normal.  No pericardial effusions.  Normal vasculature.     Negative for mediastinal adenopathy.  Lungs are clear.  No nodules.  No effusions.     No suspicious osseous findings.  Chest wall unremarkable.     CT abdomen and pelvis:  Normal liver, spleen, pancreas, gallbladder, adrenals.  Again seen are several tiny nonobstructing  renal calculi.     Bowel nonobstructed.  No adenopathy.  No ascites.  Diastases between the rectus musculature again noted with some bowel bulging into this region at the level of the umbilicus with additional supraumbilical fat-containing areas of ventral hernia.  Normal bladder, uterus, adnexa.  No pelvic adenopathy.     Stable degenerative changes lumbar spine.  No acute osseous findings.  Mild scattered calcified arterial plaque.        Impression:     Stable exam without evidence of recurrent or metastatic disease in the chest, abdomen, or pelvis.    ASSESSMENT/PLAN:     74yo F with colonic adenocarcinoma now s/p subtotal colectomy with partial gastrectomy on 5/10/22 with final path showing Stage II T4bN0 adenocarcinoma with negative margins    - CEA level pending today and q3 months  - CT q6-12 months, due Nov 2024  - Cscope due in 2026  - RTC 3 months for surveillance    Maury Liu MD  Colon and Rectal Surgery  [facility]

## 2024-07-18 LAB — CEA SERPL-MCNC: 1.9 NG/ML (ref 0–5)

## 2024-08-28 ENCOUNTER — OFFICE VISIT (OUTPATIENT)
Dept: DERMATOLOGY | Facility: CLINIC | Age: 74
End: 2024-08-28
Payer: MEDICARE

## 2024-08-28 VITALS — BODY MASS INDEX: 31.18 KG/M2 | WEIGHT: 217.81 LBS | HEIGHT: 70 IN

## 2024-08-28 DIAGNOSIS — L81.1 MELASMA: Primary | ICD-10-CM

## 2024-08-28 DIAGNOSIS — L81.9 DYSPIGMENTATION: ICD-10-CM

## 2024-08-28 PROCEDURE — 99999 PR PBB SHADOW E&M-EST. PATIENT-LVL III: CPT | Mod: PBBFAC,,, | Performed by: STUDENT IN AN ORGANIZED HEALTH CARE EDUCATION/TRAINING PROGRAM

## 2024-08-28 PROCEDURE — 3044F HG A1C LEVEL LT 7.0%: CPT | Mod: CPTII,S$GLB,, | Performed by: STUDENT IN AN ORGANIZED HEALTH CARE EDUCATION/TRAINING PROGRAM

## 2024-08-28 PROCEDURE — G2211 COMPLEX E/M VISIT ADD ON: HCPCS | Mod: S$GLB,,, | Performed by: STUDENT IN AN ORGANIZED HEALTH CARE EDUCATION/TRAINING PROGRAM

## 2024-08-28 PROCEDURE — 3288F FALL RISK ASSESSMENT DOCD: CPT | Mod: CPTII,S$GLB,, | Performed by: STUDENT IN AN ORGANIZED HEALTH CARE EDUCATION/TRAINING PROGRAM

## 2024-08-28 PROCEDURE — 1160F RVW MEDS BY RX/DR IN RCRD: CPT | Mod: CPTII,S$GLB,, | Performed by: STUDENT IN AN ORGANIZED HEALTH CARE EDUCATION/TRAINING PROGRAM

## 2024-08-28 PROCEDURE — 3008F BODY MASS INDEX DOCD: CPT | Mod: CPTII,S$GLB,, | Performed by: STUDENT IN AN ORGANIZED HEALTH CARE EDUCATION/TRAINING PROGRAM

## 2024-08-28 PROCEDURE — 1126F AMNT PAIN NOTED NONE PRSNT: CPT | Mod: CPTII,S$GLB,, | Performed by: STUDENT IN AN ORGANIZED HEALTH CARE EDUCATION/TRAINING PROGRAM

## 2024-08-28 PROCEDURE — 1101F PT FALLS ASSESS-DOCD LE1/YR: CPT | Mod: CPTII,S$GLB,, | Performed by: STUDENT IN AN ORGANIZED HEALTH CARE EDUCATION/TRAINING PROGRAM

## 2024-08-28 PROCEDURE — 1159F MED LIST DOCD IN RCRD: CPT | Mod: CPTII,S$GLB,, | Performed by: STUDENT IN AN ORGANIZED HEALTH CARE EDUCATION/TRAINING PROGRAM

## 2024-08-28 PROCEDURE — 99203 OFFICE O/P NEW LOW 30 MIN: CPT | Mod: S$GLB,,, | Performed by: STUDENT IN AN ORGANIZED HEALTH CARE EDUCATION/TRAINING PROGRAM

## 2024-08-28 NOTE — PROGRESS NOTES
Subjective:       Patient ID:  Ambar Duenas is a 74 y.o. female who presents for   Chief Complaint   Patient presents with    Skin Discoloration     History of Present Illness: The patient presents with chief complaint of hair dye leading to discoloration on the face.  Patient reports over a month ago, having had dark, thick hair dye that spilled on the the face during application by the ears and has left behind darkened discoloration. Has been using several cleansing products to remove the discoloration, which has helped some. Patient inquiring if there's anything else to help speed up the process. Patient also reports having prior to this splotchy discoloration under the eyes and cheeks.         Review of Systems   Constitutional:  Negative for fever and chills.   Skin:  Positive for rash.        Objective:    Physical Exam   Constitutional: She appears well-developed and well-nourished. No distress.   Neurological: She is alert and oriented to person, place, and time. She is not disoriented.   Psychiatric: She has a normal mood and affect.   Skin:   Areas Examined (abnormalities noted in diagram):   Head / Face Inspection Performed  Neck Inspection Performed              Diagram Legend     Erythematous scaling macule/papule c/w actinic keratosis       Vascular papule c/w angioma      Pigmented verrucoid papule/plaque c/w seborrheic keratosis      Yellow umbilicated papule c/w sebaceous hyperplasia      Irregularly shaped tan macule c/w lentigo     1-2 mm smooth white papules consistent with Milia      Movable subcutaneous cyst with punctum c/w epidermal inclusion cyst      Subcutaneous movable cyst c/w pilar cyst      Firm pink to brown papule c/w dermatofibroma      Pedunculated fleshy papule(s) c/w skin tag(s)      Evenly pigmented macule c/w junctional nevus     Mildly variegated pigmented, slightly irregular-bordered macule c/w mildly atypical nevus      Flesh colored to evenly pigmented papule c/w  intradermal nevus       Pink pearly papule/plaque c/w basal cell carcinoma      Erythematous hyperkeratotic cursted plaque c/w SCC      Surgical scar with no sign of skin cancer recurrence      Open and closed comedones      Inflammatory papules and pustules      Verrucoid papule consistent consistent with wart     Erythematous eczematous patches and plaques     Dystrophic onycholytic nail with subungual debris c/w onychomycosis     Umbilicated papule    Erythematous-base heme-crusted tan verrucoid plaque consistent with inflamed seborrheic keratosis     Erythematous Silvery Scaling Plaque c/w Psoriasis     See annotation      Assessment / Plan:      Dyspigmentation - hair dye that came into contact with the face, some improvement over time. Discussed with patient to continue to cleanse the area daily with gentle soaps/moisturizers as dye continues to wear off the skin.     Melasma  Discussed topical retinoid creams and daily sun protection, SPF 30+ daily.                Follow up in about 6 months (around 2/28/2025).

## 2024-09-05 ENCOUNTER — TELEPHONE (OUTPATIENT)
Dept: SPORTS MEDICINE | Facility: CLINIC | Age: 74
End: 2024-09-05
Payer: MEDICARE

## 2024-09-05 ENCOUNTER — PATIENT MESSAGE (OUTPATIENT)
Dept: SPORTS MEDICINE | Facility: CLINIC | Age: 74
End: 2024-09-05
Payer: MEDICARE

## 2024-09-05 NOTE — TELEPHONE ENCOUNTER
----- Message from Nik Huber sent at 9/5/2024  9:21 AM CDT -----  Contact: Ambar  Type:  Sooner Apoointment Request    Caller is requesting a sooner appointment.  Caller declined first available appointment listed below.  Caller will not accept being placed on the waitlist and is requesting a message be sent to doctor.  Name of Caller:Ambar  When is the first available appointment?10/08  Symptoms:bilat knee pain  Would the patient rather a call back or a response via [facility]? call  Best Call Back Number:[phone]   Additional Information:

## 2024-09-06 DIAGNOSIS — M79.652 PAIN IN BOTH THIGHS: Primary | ICD-10-CM

## 2024-09-06 DIAGNOSIS — M79.651 PAIN IN BOTH THIGHS: Primary | ICD-10-CM

## 2024-09-10 ENCOUNTER — OFFICE VISIT (OUTPATIENT)
Dept: SPORTS MEDICINE | Facility: CLINIC | Age: 74
End: 2024-09-10
Payer: MEDICARE

## 2024-09-10 ENCOUNTER — HOSPITAL ENCOUNTER (OUTPATIENT)
Dept: RADIOLOGY | Facility: HOSPITAL | Age: 74
Discharge: HOME | End: 2024-09-10
Attending: STUDENT IN AN ORGANIZED HEALTH CARE EDUCATION/TRAINING PROGRAM
Payer: MEDICARE

## 2024-09-10 DIAGNOSIS — M79.652 PAIN IN BOTH THIGHS: ICD-10-CM

## 2024-09-10 DIAGNOSIS — T14.8XXA BONE BRUISE: Primary | ICD-10-CM

## 2024-09-10 DIAGNOSIS — M79.651 PAIN IN BOTH THIGHS: ICD-10-CM

## 2024-09-10 DIAGNOSIS — M62.89 MUSCLE STIFFNESS: ICD-10-CM

## 2024-09-10 PROCEDURE — 3288F FALL RISK ASSESSMENT DOCD: CPT | Mod: CPTII,S$GLB,, | Performed by: STUDENT IN AN ORGANIZED HEALTH CARE EDUCATION/TRAINING PROGRAM

## 2024-09-10 PROCEDURE — 3044F HG A1C LEVEL LT 7.0%: CPT | Mod: CPTII,S$GLB,, | Performed by: STUDENT IN AN ORGANIZED HEALTH CARE EDUCATION/TRAINING PROGRAM

## 2024-09-10 PROCEDURE — 1159F MED LIST DOCD IN RCRD: CPT | Mod: CPTII,S$GLB,, | Performed by: STUDENT IN AN ORGANIZED HEALTH CARE EDUCATION/TRAINING PROGRAM

## 2024-09-10 PROCEDURE — 99999 PR PBB SHADOW E&M-EST. PATIENT-LVL II: CPT | Mod: PBBFAC,,, | Performed by: STUDENT IN AN ORGANIZED HEALTH CARE EDUCATION/TRAINING PROGRAM

## 2024-09-10 PROCEDURE — 1101F PT FALLS ASSESS-DOCD LE1/YR: CPT | Mod: CPTII,S$GLB,, | Performed by: STUDENT IN AN ORGANIZED HEALTH CARE EDUCATION/TRAINING PROGRAM

## 2024-09-10 PROCEDURE — 99214 OFFICE O/P EST MOD 30 MIN: CPT | Mod: S$GLB,,, | Performed by: STUDENT IN AN ORGANIZED HEALTH CARE EDUCATION/TRAINING PROGRAM

## 2024-09-10 PROCEDURE — 73564 X-RAY EXAM KNEE 4 OR MORE: CPT | Mod: TC,50,PN

## 2024-09-10 PROCEDURE — 1125F AMNT PAIN NOTED PAIN PRSNT: CPT | Mod: CPTII,S$GLB,, | Performed by: STUDENT IN AN ORGANIZED HEALTH CARE EDUCATION/TRAINING PROGRAM

## 2024-09-10 NOTE — PROGRESS NOTES
Patient ID: Ambar Duenas  YOB: 1950  MRN: [id number]    Chief Complaint: Pain of the Left Knee and Pain of the Right Knee      History of Present Illness: Ambar Duenas is a 74-year-old female presenting today for right medial knee pain after banging and into a car door about 2 weeks ago.  She is a well-known patient of mine that we have seen in the past for some muscle tightness and weakness.  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  She notes about 2 weeks ago being left knee on the car door when getting out and has some pain over the medial aspect of the knee tender to touch no bruising associated.  Worse with increased standing walking and activity extending down the medial tibia.    Past Medical History:   Past Medical History:   Diagnosis Date    Anemia, unspecified     Fibroids     GERD (gastroesophageal reflux disease)     History of colon cancer     Hyperlipidemia     Hypertension     Postmenopausal      Past Surgical History:   Procedure Laterality Date    COLONOSCOPY N/A 3/28/2022    Procedure: COLONOSCOPY;  Surgeon: Melanie Ballard MD;  Location: [facility] END[facility];  Service: Endoscopy;  Laterality: N/A;    COLONOSCOPY N/A 6/28/2023    Procedure: COLONOSCOPY;  Surgeon: Maury Liu MD;  Location: [facility] ENDO;  Service: General;  Laterality: N/A;    ESOPHAGOGASTRODUODENOSCOPY N/A 3/28/2022    Procedure: ESOPHAGOGASTRODUODENOSCOPY (EGD);  Surgeon: Melanie Ballard MD;  Location: [facility];  Service: Endoscopy;  Laterality: N/A;    HEMORRHOID SURGERY      HYSTEROSCOPY WITH DILATION AND CURETTAGE OF UTERUS N/A 5/10/2022    Procedure: HYSTEROSCOPY, WITH DILATION AND CURETTAGE OF UTERUS;  Surgeon: Madelyn West MD;  Location: [facility] OR;  Service: OB/GYN;  Laterality: N/A;    INJECTION OF ANESTHETIC AGENT INTO TISSUE PLANE DEFINED BY TRANSVERSUS ABDOMINIS MUSCLE N/A 5/10/2022    Procedure: BLOCK, TRANSVERSUS ABDOMINIS PLANE;   Surgeon: Maury Liu MD;  Location: [facility];  Service: General;  Laterality: N/A;    LIVER BIOPSY  5/10/2022    Procedure: BIOPSY, LIVER;  Surgeon: Maury Liu MD;  Location: [facility] OR;  Service: General;;    OMENTECTOMY  5/10/2022    Procedure: OMENTECTOMY;  Surgeon: Maury Liu MD;  Location: [facility] OR;  Service: General;;  partial    PARTIAL GASTRECTOMY N/A 5/10/2022    Procedure: GASTRECTOMY, PARTIAL;  Surgeon: Maury Liu MD;  Location: [facility] OR;  Service: General;  Laterality: N/A;    SUBTOTAL COLECTOMY Left 5/10/2022    Procedure: COLECTOMY, PARTIAL;  Surgeon: Maury Liu MD;  Location: [facility];  Service: General;  Laterality: Left;  LEFT VS SUBTOTAl vs EXTENDED RIGHT     Family History   Problem Relation Name Age of Onset    No Known Problems Mother      No Known Problems Father       Social History     Socioeconomic History    Marital status:    Tobacco Use    Smoking status: Never     Passive exposure: Never    Smokeless tobacco: Never   Substance and Sexual Activity    Alcohol use: Yes     Alcohol/week: 2.0 standard drinks of alcohol     Types: 2 Glasses of wine per week     Comment: occ    Drug use: Never    Sexual activity: Not Currently     Partners: Male     Birth control/protection: Post-menopausal     Social Determinants of Health     Physical Activity: Sufficiently Active (11/7/2023)    Exercise Vital Sign     Days of Exercise per Week: 5 days     Minutes of Exercise per Session: 30 min     Medication List with Changes/Refills   Current Medications    AMLODIPINE (NORVASC) 5 MG TABLET    Take 1 tablet (5 mg total) by mouth once daily.    CYANOCOBALAMIN (VITAMIN B-12) 250 MCG TABLET    Take 250 mcg by mouth once daily.    GABAPENTIN (NEURONTIN) 100 MG CAPSULE    Take 1 capsule (100 mg total) by mouth every evening.    MONTELUKAST (SINGULAIR) 10 MG TABLET    Take 1 tablet (10 mg total) by mouth every evening.    PANTOPRAZOLE (PROTONIX) 40 MG TABLET    Take 1 tablet  (40 mg total) by mouth 2 (two) times daily.     Review of patient's allergies indicates:  No Known Allergies    Physical Exam:   There is no height or weight on file to calculate BMI.    GENERAL: Well appearing, in no acute distress.  HEAD: Normocephalic and atraumatic.  ENT: External ears and nose grossly normal.  EYES: EOMI bilaterally  PULMONARY: Respirations are grossly even and non-labored.  NEURO: Awake, alert, and oriented x 3.  SKIN: No obvious rashes appreciated.  PSYCH: Mood & affect are appropriate.    Detailed MSK exam:     Tenderness over the medial tibial plateau mild tenderness in the joint line medially no tenderness of the femoral condyle medially no large effusion good patellar mobility full extension mechanism intact negative Merry's ligaments intact as well no tenderness over lateral joint line.    Imaging:    Per my review early degenerative changes of the patellofemoral joint noted without any significant narrowing of either joint line.  No large effusion appreciated    Relevant imaging results were reviewed and interpreted by me and per my read as above.  This was discussed with the patient and / or family today.     Assessment:  Ambar Duenas is a 74 y.o. female presents today for left medial knee pain consistent with a bone bruise/contusion to the medial knee.  She is tender over this area no significant bony changes appreciated and has some mild arthritic changes the patellofemoral joint.  I discussed at this time watchful waiting topicals Tylenol and ice as needed.  If not seeing improvement in 3-4 weeks could consider advanced imaging or further evaluation.  Does not have any mechanical symptoms or concerning signs for intra-articular pathology.    Bone bruise    Muscle stiffness           Mumtaz Darling MD    Disclaimer: This note was prepared using a voice recognition system and is likely to have sound alike errors within the text.

## 2024-09-18 DIAGNOSIS — I10 ESSENTIAL HYPERTENSION: ICD-10-CM

## 2024-09-18 NOTE — TELEPHONE ENCOUNTER
No care due was identified.  [facility] Embedded Care Due Messages. Reference number: [id number].   9/18/2024 10:11:00 AM CDT

## 2024-09-19 NOTE — TELEPHONE ENCOUNTER
Refill Routing Note   Medication(s) are not appropriate for processing by [facility] for the following reason(s):        Required vitals abnormal: BP (!) 158/88     [facility] action(s):  Defer      Medication Therapy Plan:         Appointments  past 12m or future 3m with PCP    Date Provider   Last Visit   4/19/2024 Lilian Murrieta MD   Next Visit   Visit date not found Lilian Murrieta MD   ED visits in past 90 days: 0        Note composed:10:06 AM 09/19/2024

## 2024-09-20 RX ORDER — AMLODIPINE BESYLATE 5 MG/1
5 TABLET ORAL DAILY
Qty: 90 TABLET | Refills: 0 | Status: SHIPPED | OUTPATIENT
Start: 2024-09-20

## 2024-09-24 ENCOUNTER — TELEPHONE (OUTPATIENT)
Dept: FAMILY MEDICINE | Facility: CLINIC | Age: 74
End: 2024-09-24
Payer: MEDICARE

## 2024-09-30 ENCOUNTER — NURSE TRIAGE (OUTPATIENT)
Dept: ADMINISTRATIVE | Facility: CLINIC | Age: 74
End: 2024-09-30
Payer: MEDICARE

## 2024-09-30 ENCOUNTER — TELEPHONE (OUTPATIENT)
Dept: SPORTS MEDICINE | Facility: CLINIC | Age: 74
End: 2024-09-30
Payer: MEDICARE

## 2024-09-30 NOTE — TELEPHONE ENCOUNTER
Spoke with pt who reports welling to left knee. Also reports stiffness to hip as well. Pt advised to be seen today in office. Pt states she lives in [place] and would not be able to come in today. Asking for appointment tomorrow. Advised will send message to office.        Reason for Disposition   SEVERE swelling (e.g., can't move swollen knee at all)    Additional Information   Negative: Knee pain and fever   Negative: Knee redness and fever   Negative: Patient sounds very sick or weak to the triager   Negative: SEVERE pain (e.g., excruciating, unable to walk) and not improved after 2 hours of pain medicine   Negative: Redness and painful when touched and no fever   Negative: Red area or streak > 2 inches (or 5 cm)   Negative: Thigh or calf pain and only 1 side and present > 1 hour   Negative: Thigh or calf swelling and only 1 side    Protocols used: Knee Swelling-A-[place]

## 2024-09-30 NOTE — TELEPHONE ENCOUNTER
----- Message from Greyson sent at 9/27/2024  9:45 AM CDT -----  Regarding: appt  Name of Who is Calling:Pt        What is the request in detail: Requesting for sooner appt date if possible            Can the clinic reply by [facility]: yes but call         What Number to Call Back [facility]:Telephone Information:  Mobile          [phone]

## 2024-10-02 ENCOUNTER — TELEPHONE (OUTPATIENT)
Dept: SPORTS MEDICINE | Facility: CLINIC | Age: 74
End: 2024-10-02
Payer: MEDICARE

## 2024-10-02 NOTE — TELEPHONE ENCOUNTER
----- Message from Nik sent at 10/2/2024 12:45 PM CDT -----  Contact: garrison Villalta is needing a call back in regards to r/s her appt. She stated that she had to leave due to hear house alarm going off. Attempt to schedule patient for first avail of 11/05, patient declined. Please give her a call back at [phone]

## 2024-10-03 ENCOUNTER — TELEPHONE (OUTPATIENT)
Dept: FAMILY MEDICINE | Facility: CLINIC | Age: 74
End: 2024-10-03
Payer: MEDICARE

## 2024-10-08 ENCOUNTER — OFFICE VISIT (OUTPATIENT)
Dept: SPORTS MEDICINE | Facility: CLINIC | Age: 74
End: 2024-10-08
Payer: MEDICARE

## 2024-10-08 DIAGNOSIS — T14.8XXA BONE BRUISE: Primary | ICD-10-CM

## 2024-10-08 DIAGNOSIS — M25.562 LEFT MEDIAL KNEE PAIN: ICD-10-CM

## 2024-10-08 PROCEDURE — 1125F AMNT PAIN NOTED PAIN PRSNT: CPT | Mod: CPTII,S$GLB,, | Performed by: STUDENT IN AN ORGANIZED HEALTH CARE EDUCATION/TRAINING PROGRAM

## 2024-10-08 PROCEDURE — 1159F MED LIST DOCD IN RCRD: CPT | Mod: CPTII,S$GLB,, | Performed by: STUDENT IN AN ORGANIZED HEALTH CARE EDUCATION/TRAINING PROGRAM

## 2024-10-08 PROCEDURE — 1101F PT FALLS ASSESS-DOCD LE1/YR: CPT | Mod: CPTII,S$GLB,, | Performed by: STUDENT IN AN ORGANIZED HEALTH CARE EDUCATION/TRAINING PROGRAM

## 2024-10-08 PROCEDURE — 3288F FALL RISK ASSESSMENT DOCD: CPT | Mod: CPTII,S$GLB,, | Performed by: STUDENT IN AN ORGANIZED HEALTH CARE EDUCATION/TRAINING PROGRAM

## 2024-10-08 PROCEDURE — 3044F HG A1C LEVEL LT 7.0%: CPT | Mod: CPTII,S$GLB,, | Performed by: STUDENT IN AN ORGANIZED HEALTH CARE EDUCATION/TRAINING PROGRAM

## 2024-10-08 PROCEDURE — 99213 OFFICE O/P EST LOW 20 MIN: CPT | Mod: S$GLB,,, | Performed by: STUDENT IN AN ORGANIZED HEALTH CARE EDUCATION/TRAINING PROGRAM

## 2024-10-08 PROCEDURE — 99999 PR PBB SHADOW E&M-EST. PATIENT-LVL II: CPT | Mod: PBBFAC,,, | Performed by: STUDENT IN AN ORGANIZED HEALTH CARE EDUCATION/TRAINING PROGRAM

## 2024-10-08 NOTE — PROGRESS NOTES
Patient ID: Ambar Duenas  YOB: 1950  MRN: [id number]    Chief Complaint: Pain of the Left Knee      History of Present Illness: Ambar Duenas is a 74-year-old female presenting today for follow-up for left anterior medial knee pain.  Pain is improved dramatically of the left knee but still has some tenderness over the medial femoral condyle.  Overall has seen improvements with relative rest.  Has been doing some herbal supplements and warm all of oil topical that has been very beneficial to her knee.  Denies any new injuries falls traumas.  Is interested in continuing physical activity and exercise.    Past Medical History:   Past Medical History:   Diagnosis Date    Anemia, unspecified     Fibroids     GERD (gastroesophageal reflux disease)     History of colon cancer     Hyperlipidemia     Hypertension     Postmenopausal      Past Surgical History:   Procedure Laterality Date    COLONOSCOPY N/A 3/28/2022    Procedure: COLONOSCOPY;  Surgeon: Melanie Ballard MD;  Location: [facility];  Service: Endoscopy;  Laterality: N/A;    COLONOSCOPY N/A 6/28/2023    Procedure: COLONOSCOPY;  Surgeon: Maury Liu MD;  Location: [facility];  Service: General;  Laterality: N/A;    ESOPHAGOGASTRODUODENOSCOPY N/A 3/28/2022    Procedure: ESOPHAGOGASTRODUODENOSCOPY (EGD);  Surgeon: Melanie Ballard MD;  Location: [facility];  Service: Endoscopy;  Laterality: N/A;    HEMORRHOID SURGERY      HYSTEROSCOPY WITH DILATION AND CURETTAGE OF UTERUS N/A 5/10/2022    Procedure: HYSTEROSCOPY, WITH DILATION AND CURETTAGE OF UTERUS;  Surgeon: Madelyn West MD;  Location: [facility];  Service: OB/GYN;  Laterality: N/A;    INJECTION OF ANESTHETIC AGENT INTO TISSUE PLANE DEFINED BY TRANSVERSUS ABDOMINIS MUSCLE N/A 5/10/2022    Procedure: BLOCK, TRANSVERSUS ABDOMINIS PLANE;  Surgeon: Maury Liu MD;  Location: [facility];  Service: General;  Laterality: N/A;    LIVER BIOPSY  5/10/2022    Procedure: BIOPSY,  LIVER;  Surgeon: Maury Liu MD;  Location: [facility];  Service: General;;    OMENTECTOMY  5/10/2022    Procedure: OMENTECTOMY;  Surgeon: Maury Liu MD;  Location: [facility];  Service: General;;  partial    PARTIAL GASTRECTOMY N/A 5/10/2022    Procedure: GASTRECTOMY, PARTIAL;  Surgeon: Maury Liu MD;  Location: [facility];  Service: General;  Laterality: N/A;    SUBTOTAL COLECTOMY Left 5/10/2022    Procedure: COLECTOMY, PARTIAL;  Surgeon: Maury Liu MD;  Location: [facility];  Service: General;  Laterality: Left;  LEFT VS SUBTOTAl vs EXTENDED RIGHT     Family History   Problem Relation Name Age of Onset    No Known Problems Mother      No Known Problems Father       Social History     Socioeconomic History    Marital status:    Tobacco Use    Smoking status: Never     Passive exposure: Never    Smokeless tobacco: Never   Substance and Sexual Activity    Alcohol use: Yes     Alcohol/week: 2.0 standard drinks of alcohol     Types: 2 Glasses of wine per week     Comment: occ    Drug use: Never    Sexual activity: Not Currently     Partners: Male     Birth control/protection: Post-menopausal     Social Drivers of Health     Physical Activity: Sufficiently Active (11/7/2023)    Exercise Vital Sign     Days of Exercise per Week: 5 days     Minutes of Exercise per Session: 30 min     Medication List with Changes/Refills   Current Medications    AMLODIPINE (NORVASC) 5 MG TABLET    Take 1 tablet (5 mg total) by mouth once daily.    CYANOCOBALAMIN (VITAMIN B-12) 250 MCG TABLET    Take 250 mcg by mouth once daily.    GABAPENTIN (NEURONTIN) 100 MG CAPSULE    Take 1 capsule (100 mg total) by mouth every evening.    MONTELUKAST (SINGULAIR) 10 MG TABLET    Take 1 tablet (10 mg total) by mouth every evening.    PANTOPRAZOLE (PROTONIX) 40 MG TABLET    Take 1 tablet (40 mg total) by mouth 2 (two) times daily.     Review of patient's allergies indicates:  No Known Allergies    Physical Exam:   There is no  height or weight on file to calculate BMI.    GENERAL: Well appearing, in no acute distress.  HEAD: Normocephalic and atraumatic.  ENT: External ears and nose grossly normal.  EYES: EOMI bilaterally  PULMONARY: Respirations are grossly even and non-labored.  NEURO: Awake, alert, and oriented x 3.  SKIN: No obvious rashes appreciated.  PSYCH: Mood & affect are appropriate.    Detailed MSK exam:     Tenderness over the left medial femoral condyle no tenderness over the joint line or tibial plateau.  Full range of motion no large effusion appreciated normal gait    Imaging:  X-ray Knee Ortho Bilateral with Flexion  Narrative: EXAMINATION:  XR KNEE ORTHO BILAT WITH FLEXION    CLINICAL HISTORY:  Pain in right thigh    TECHNIQUE:  XR KNEE ORTHO BILAT WITH FLEXION    COMPARISON:  Using the knees from 10/07/2021.    FINDINGS:  Tricompartmental joint space narrowing worst in the medial compartment bilaterally.  Bilateral chondrocalcinosis.  Chondrocalcinosis slightly out of proportion to degenerative change may suggest alternative etiologies such as CPPD.  Impression: As above.    Electronically signed by: Jarrell Gill  Date:    09/10/2024  Time:    16:02      Relevant imaging results were reviewed and interpreted by me and per my read as above.  This was discussed with the patient and / or family today.     Assessment:  Ambar Duenas is a 74 y.o. female presents today for follow-up for left medial knee pain.  Still has some tenderness over the medial femoral condyle possible healing insufficiency injury but otherwise has seen dramatic improvements.  Discussed continuing with return to exercise activity and if worsening or flaring over time could always consider an MRI for further evaluation but clinically she seems to be improving appropriately.  Follow-up p.r.n.    Bone bruise    Left medial knee pain           Mumtaz Darling MD    Disclaimer: This note was prepared using a voice recognition system and is likely to have  sound alike errors within the text.

## 2024-10-10 ENCOUNTER — TELEPHONE (OUTPATIENT)
Dept: FAMILY MEDICINE | Facility: CLINIC | Age: 74
End: 2024-10-10
Payer: MEDICARE

## 2024-10-10 NOTE — TELEPHONE ENCOUNTER
Tried reaching patient to schedule her physical. Unable to reach her or leave a voicemail for her.

## 2024-10-16 ENCOUNTER — OFFICE VISIT (OUTPATIENT)
Dept: SURGERY | Facility: CLINIC | Age: 74
End: 2024-10-16
Payer: MEDICARE

## 2024-10-16 ENCOUNTER — LAB VISIT (OUTPATIENT)
Dept: LAB | Facility: HOSPITAL | Age: 74
End: 2024-10-16
Attending: COLON & RECTAL SURGERY
Payer: MEDICARE

## 2024-10-16 VITALS
OXYGEN SATURATION: 97 % | BODY MASS INDEX: 31.62 KG/M2 | HEART RATE: 89 BPM | WEIGHT: 220.88 LBS | TEMPERATURE: 99 F | DIASTOLIC BLOOD PRESSURE: 81 MMHG | HEIGHT: 70 IN | SYSTOLIC BLOOD PRESSURE: 162 MMHG

## 2024-10-16 DIAGNOSIS — Z85.038 PERSONAL HISTORY OF COLON CANCER: ICD-10-CM

## 2024-10-16 DIAGNOSIS — C18.9 MALIGNANT NEOPLASM OF COLON, UNSPECIFIED PART OF COLON: ICD-10-CM

## 2024-10-16 DIAGNOSIS — Z85.038 PERSONAL HISTORY OF COLON CANCER: Primary | ICD-10-CM

## 2024-10-16 LAB — CEA SERPL-MCNC: 2.5 NG/ML (ref 0–5)

## 2024-10-16 PROCEDURE — 99999 PR PBB SHADOW E&M-EST. PATIENT-LVL III: CPT | Mod: PBBFAC,,, | Performed by: COLON & RECTAL SURGERY

## 2024-10-16 PROCEDURE — 82378 CARCINOEMBRYONIC ANTIGEN: CPT | Performed by: COLON & RECTAL SURGERY

## 2024-10-16 PROCEDURE — 36415 COLL VENOUS BLD VENIPUNCTURE: CPT | Performed by: COLON & RECTAL SURGERY

## 2024-10-16 NOTE — PROGRESS NOTES
History & Physical    SUBJECTIVE:     Ref: Melanie Ballard MD    History of Present Illness:  Patient is a 74 y.o. female presents forEvaluation of colonic adenocarcinoma.  Patient was undergoing surveillance colonoscopy in 2022 where she was found to have a almost completely obstructing colon mass that was biopsied and resulted as adenocarcinoma.  This was initially thought to be at the sigmoid colon upon imaging with CT scan of the chest abdomen pelvis, no metastatic disease was found but she was found to have a large fungating mass near the splenic flexure with a normal appearing sigmoid.  She has been asymptomatic but before the colonoscopy in since that time.  She denies any fever, chills, nausea, vomiting, hematochezia, melena or abdominal pain.  She only reports a minor amount of reflux that has since resolved with the addition of pantoprazole.  Her only significant past surgical history is  section via lower midline incision.  She denies any family history of colorectal cancer or IBD.  She has had previous colonic polyps on a colonoscopy around 7 years ago.  She reports she is still having normal bowel movements.    5/10/2022: open subtotal colectomy, partial gastrectomy (final path: Stage II T4N0 adenocarcinoma    Interval history:  Since last clinic visit, the patient has done well.  Denies any hematochezia or melena. Tolerating regular diet without nausea or vomiting. No abdominal pain. No complaints.    Review of patient's allergies indicates:  No Known Allergies    Current Outpatient Medications   Medication Sig Dispense Refill    amLODIPine (NORVASC) 5 MG tablet Take 1 tablet (5 mg total) by mouth once daily. 90 tablet 0    cyanocobalamin (VITAMIN B-12) 250 MCG tablet Take 250 mcg by mouth once daily.      gabapentin (NEURONTIN) 100 MG capsule Take 1 capsule (100 mg total) by mouth every evening. 30 capsule 1    montelukast (SINGULAIR) 10 mg tablet Take 1 tablet (10 mg total) by mouth  every evening. 90 tablet 1    pantoprazole (PROTONIX) 40 MG tablet Take 1 tablet (40 mg total) by mouth 2 (two) times daily. 180 tablet 1     No current facility-administered medications for this visit.       Past Medical History:   Diagnosis Date    Anemia, unspecified     Fibroids     GERD (gastroesophageal reflux disease)     History of colon cancer     Hyperlipidemia     Hypertension     Postmenopausal      Past Surgical History:   Procedure Laterality Date    COLONOSCOPY N/A 3/28/2022    Procedure: COLONOSCOPY;  Surgeon: Melanie Ballard MD;  Location: [facility];  Service: Endoscopy;  Laterality: N/A;    COLONOSCOPY N/A 6/28/2023    Procedure: COLONOSCOPY;  Surgeon: Maury Liu MD;  Location: [facility];  Service: General;  Laterality: N/A;    ESOPHAGOGASTRODUODENOSCOPY N/A 3/28/2022    Procedure: ESOPHAGOGASTRODUODENOSCOPY (EGD);  Surgeon: Melanie Ballard MD;  Location: [facility];  Service: Endoscopy;  Laterality: N/A;    HEMORRHOID SURGERY      HYSTEROSCOPY WITH DILATION AND CURETTAGE OF UTERUS N/A 5/10/2022    Procedure: HYSTEROSCOPY, WITH DILATION AND CURETTAGE OF UTERUS;  Surgeon: Madelyn West MD;  Location: [facility];  Service: OB/GYN;  Laterality: N/A;    INJECTION OF ANESTHETIC AGENT INTO TISSUE PLANE DEFINED BY TRANSVERSUS ABDOMINIS MUSCLE N/A 5/10/2022    Procedure: BLOCK, TRANSVERSUS ABDOMINIS PLANE;  Surgeon: Maury Liu MD;  Location: [facility];  Service: General;  Laterality: N/A;    LIVER BIOPSY  5/10/2022    Procedure: BIOPSY, LIVER;  Surgeon: Maury Liu MD;  Location: [facility];  Service: General;;    OMENTECTOMY  5/10/2022    Procedure: OMENTECTOMY;  Surgeon: Maury Liu MD;  Location: [facility];  Service: General;;  partial    PARTIAL GASTRECTOMY N/A 5/10/2022    Procedure: GASTRECTOMY, PARTIAL;  Surgeon: Maury Liu MD;  Location: [facility];  Service: General;  Laterality: N/A;    SUBTOTAL COLECTOMY Left 5/10/2022    Procedure: COLECTOMY, PARTIAL;  Surgeon:  "Maury Liu MD;  Location: [facility];  Service: General;  Laterality: Left;  LEFT VS SUBTOTAl vs EXTENDED RIGHT     Family History   Problem Relation Name Age of Onset    No Known Problems Mother      No Known Problems Father       Social History     Tobacco Use    Smoking status: Never     Passive exposure: Never    Smokeless tobacco: Never   Substance Use Topics    Alcohol use: Yes     Alcohol/week: 2.0 standard drinks of alcohol     Types: 2 Glasses of wine per week     Comment: occ    Drug use: Never        Review of Systems:  Review of Systems   Constitutional:  Negative for activity change, appetite change, chills, fatigue, fever and unexpected weight change.   HENT:  Negative for congestion, ear pain, sore throat and trouble swallowing.    Eyes:  Negative for pain, redness and itching.   Respiratory:  Negative for cough, shortness of breath and wheezing.    Cardiovascular:  Negative for chest pain, palpitations and leg swelling.   Gastrointestinal:  Negative for abdominal distention, abdominal pain, anal bleeding, blood in stool, constipation, diarrhea, nausea, rectal pain and vomiting.   Endocrine: Negative for cold intolerance, heat intolerance and polyuria.   Genitourinary:  Negative for dysuria, flank pain, frequency and hematuria.   Musculoskeletal:  Negative for gait problem, joint swelling and neck pain.   Skin:  Negative for color change, rash and wound.   Allergic/Immunologic: Negative for environmental allergies and immunocompromised state.   Neurological:  Negative for dizziness, speech difficulty, weakness and numbness.   Psychiatric/Behavioral:  Negative for agitation, confusion and hallucinations.        OBJECTIVE:     Vital Signs (Most Recent)  Temp: 98.6 °F (37 °C) (10/16/24 1501)  Pulse: 89 (10/16/24 1501)  BP: (!) 162/81 (10/16/24 1501)  SpO2: 97 % (10/16/24 1501)  5' 10" (1.778 m)  100.2 kg (220 lb 14.4 oz)     Physical Exam:  Physical Exam  Constitutional:       Appearance: She is " well-developed.   HENT:      Head: Normocephalic and atraumatic.   Eyes:      Conjunctiva/sclera: Conjunctivae normal.   Neck:      Thyroid: No thyromegaly.   Cardiovascular:      Rate and Rhythm: Normal rate and regular rhythm.   Pulmonary:      Effort: Pulmonary effort is normal. No respiratory distress.   Abdominal:      Comments: Soft, nondistended, nontender; well-healed midline incision   Musculoskeletal:         General: No tenderness. Normal range of motion.      Cervical back: Normal range of motion.   Skin:     General: Skin is warm and dry.      Capillary Refill: Capillary refill takes less than 2 seconds.      Findings: No rash.   Neurological:      Mental Status: She is alert and oriented to person, place, and time.         Laboratory  Lab Results   Component Value Date    WBC 4.80 05/23/2024    HGB 12.9 05/23/2024    HCT 40.3 05/23/2024     05/23/2024    CHOL 253 (H) 05/23/2024    TRIG 45 05/23/2024    HDL 63 05/23/2024    ALT 11 05/23/2024    AST 17 05/23/2024     05/23/2024    K 3.6 05/23/2024     05/23/2024    CREATININE 0.8 05/23/2024    BUN 17 05/23/2024    CO2 25 05/23/2024    TSH 0.579 05/23/2024    HGBA1C 5.7 (H) 05/23/2024       Component Ref Range & Units 3 mo ago  (7/17/24) 6 mo ago  (4/17/24) 8 mo ago  (1/22/24) 1 yr ago  (10/11/23) 1 yr ago  (5/22/23) 1 yr ago  (2/13/23) 2 yr ago  (9/12/22)   CEA 0.0 - 5.0 ng/mL 1.9 2.4 CM 2.4 CM 2.8 CM 1.9 CM 2.2 CM 2.1 CM       Diagnostic Results:  CT: Reviewed  Colonoscopy: reviewed    CT May 2024:  FINDINGS:  CT chest:  Heart size normal.  No pericardial effusions.  Normal vasculature.     Negative for mediastinal adenopathy.  Lungs are clear.  No nodules.  No effusions.     No suspicious osseous findings.  Chest wall unremarkable.     CT abdomen and pelvis:  Normal liver, spleen, pancreas, gallbladder, adrenals.  Again seen are several tiny nonobstructing renal calculi.     Bowel nonobstructed.  No adenopathy.  No ascites.   Diastases between the rectus musculature again noted with some bowel bulging into this region at the level of the umbilicus with additional supraumbilical fat-containing areas of ventral hernia.  Normal bladder, uterus, adnexa.  No pelvic adenopathy.     Stable degenerative changes lumbar spine.  No acute osseous findings.  Mild scattered calcified arterial plaque.        Impression:     Stable exam without evidence of recurrent or metastatic disease in the chest, abdomen, or pelvis.    ASSESSMENT/PLAN:     75yo F with colonic adenocarcinoma now s/p subtotal colectomy with partial gastrectomy on 5/10/22 with final path showing Stage II T4bN0 adenocarcinoma with negative margins    - CEA level pending today and q3 months  - CT q6-12 months, due Nov 2024, ordered  - Cscope due in 2026  - RTC 6 months for surveillance    Maury Liu MD  Colon and Rectal Surgery  [facility]

## 2024-10-24 NOTE — TELEPHONE ENCOUNTER
----- Message from Pricila Delvalle sent at 2/24/2022  4:00 PM CST -----  Pt is returning a call to Hilaria from earlier. She have questions about the problems she is having with her stomach. Call back number is [phone]. Thx. El       Treatment Goal Met?: yes Treatment Goal Explanation (Does Not Render In The Note): Stable for the purposes of categorizing medical decision making is defined by the specific treatment goals for an individual patient. A patient that is not at their treatment goal is not stable, even if the condition has not changed and there is no short- term threat to life or function.

## 2024-12-03 ENCOUNTER — LAB VISIT (OUTPATIENT)
Dept: LAB | Facility: HOSPITAL | Age: 74
End: 2024-12-03
Attending: NURSE PRACTITIONER
Payer: MEDICARE

## 2024-12-03 DIAGNOSIS — Z85.038 PERSONAL HISTORY OF COLON CANCER: ICD-10-CM

## 2024-12-03 LAB
CREAT SERPL-MCNC: 0.8 MG/DL (ref 0.5–1.4)
EST. GFR  (NO RACE VARIABLE): >60 ML/MIN/1.73 M^2

## 2024-12-03 PROCEDURE — 82565 ASSAY OF CREATININE: CPT

## 2024-12-03 PROCEDURE — 36415 COLL VENOUS BLD VENIPUNCTURE: CPT

## 2024-12-17 ENCOUNTER — HOSPITAL ENCOUNTER (OUTPATIENT)
Dept: RADIOLOGY | Facility: HOSPITAL | Age: 74
Discharge: HOME | End: 2024-12-17
Payer: MEDICARE

## 2024-12-17 DIAGNOSIS — Z85.038 PERSONAL HISTORY OF COLON CANCER: ICD-10-CM

## 2024-12-17 PROCEDURE — 25500020 PHARM REV CODE 255

## 2024-12-17 PROCEDURE — 71260 CT THORAX DX C+: CPT | Mod: TC

## 2024-12-17 PROCEDURE — A9698 NON-RAD CONTRAST MATERIALNOC: HCPCS

## 2024-12-17 PROCEDURE — 71260 CT THORAX DX C+: CPT | Mod: 26,,, | Performed by: STUDENT IN AN ORGANIZED HEALTH CARE EDUCATION/TRAINING PROGRAM

## 2024-12-17 PROCEDURE — 74177 CT ABD & PELVIS W/CONTRAST: CPT | Mod: 26,,, | Performed by: STUDENT IN AN ORGANIZED HEALTH CARE EDUCATION/TRAINING PROGRAM

## 2024-12-17 RX ADMIN — IOHEXOL 1000 ML: 12 SOLUTION ORAL at 08:12

## 2024-12-17 RX ADMIN — IOHEXOL 100 ML: 350 INJECTION, SOLUTION INTRAVENOUS at 09:12

## 2025-01-13 ENCOUNTER — LAB VISIT (OUTPATIENT)
Dept: LAB | Facility: HOSPITAL | Age: 75
End: 2025-01-13
Attending: FAMILY MEDICINE
Payer: MEDICARE

## 2025-01-13 ENCOUNTER — OFFICE VISIT (OUTPATIENT)
Dept: FAMILY MEDICINE | Facility: CLINIC | Age: 75
End: 2025-01-13
Attending: FAMILY MEDICINE
Payer: MEDICARE

## 2025-01-13 VITALS
BODY MASS INDEX: 31.82 KG/M2 | OXYGEN SATURATION: 95 % | HEIGHT: 70 IN | WEIGHT: 222.25 LBS | RESPIRATION RATE: 18 BRPM | SYSTOLIC BLOOD PRESSURE: 144 MMHG | DIASTOLIC BLOOD PRESSURE: 88 MMHG | HEART RATE: 101 BPM | TEMPERATURE: 99 F

## 2025-01-13 DIAGNOSIS — Z78.0 POSTMENOPAUSAL: ICD-10-CM

## 2025-01-13 DIAGNOSIS — D50.9 MICROCYTIC ANEMIA: ICD-10-CM

## 2025-01-13 DIAGNOSIS — R79.89 ELEVATED VITAMIN B12 LEVEL: ICD-10-CM

## 2025-01-13 DIAGNOSIS — I10 ESSENTIAL HYPERTENSION: ICD-10-CM

## 2025-01-13 DIAGNOSIS — R73.03 PREDIABETES: ICD-10-CM

## 2025-01-13 DIAGNOSIS — E66.811 OBESITY (BMI 30.0-34.9): ICD-10-CM

## 2025-01-13 DIAGNOSIS — Z00.00 ANNUAL PHYSICAL EXAM: Primary | ICD-10-CM

## 2025-01-13 DIAGNOSIS — C18.9 MALIGNANT NEOPLASM OF COLON, UNSPECIFIED PART OF COLON: ICD-10-CM

## 2025-01-13 LAB
ALBUMIN SERPL BCP-MCNC: 3.7 G/DL (ref 3.5–5.2)
ALP SERPL-CCNC: 70 U/L (ref 40–150)
ALT SERPL W/O P-5'-P-CCNC: 12 U/L (ref 10–44)
ANION GAP SERPL CALC-SCNC: 9 MMOL/L (ref 8–16)
AST SERPL-CCNC: 17 U/L (ref 10–40)
BASOPHILS # BLD AUTO: 0.02 K/UL (ref 0–0.2)
BASOPHILS NFR BLD: 0.4 % (ref 0–1.9)
BILIRUB SERPL-MCNC: 0.5 MG/DL (ref 0.1–1)
BUN SERPL-MCNC: 16 MG/DL (ref 8–23)
CALCIUM SERPL-MCNC: 9.2 MG/DL (ref 8.7–10.5)
CHLORIDE SERPL-SCNC: 105 MMOL/L (ref 95–110)
CHOLEST SERPL-MCNC: 252 MG/DL (ref 120–199)
CHOLEST/HDLC SERPL: 4.3 {RATIO} (ref 2–5)
CO2 SERPL-SCNC: 25 MMOL/L (ref 23–29)
CREAT SERPL-MCNC: 0.7 MG/DL (ref 0.5–1.4)
DIFFERENTIAL METHOD BLD: NORMAL
EOSINOPHIL # BLD AUTO: 0 K/UL (ref 0–0.5)
EOSINOPHIL NFR BLD: 0.9 % (ref 0–8)
ERYTHROCYTE [DISTWIDTH] IN BLOOD BY AUTOMATED COUNT: 14 % (ref 11.5–14.5)
EST. GFR  (NO RACE VARIABLE): >60 ML/MIN/1.73 M^2
ESTIMATED AVG GLUCOSE: 114 MG/DL (ref 68–131)
FERRITIN SERPL-MCNC: 44 NG/ML (ref 20–300)
GLUCOSE SERPL-MCNC: 88 MG/DL (ref 70–110)
HBA1C MFR BLD: 5.6 % (ref 4–5.6)
HCT VFR BLD AUTO: 39.6 % (ref 37–48.5)
HDLC SERPL-MCNC: 58 MG/DL (ref 40–75)
HDLC SERPL: 23 % (ref 20–50)
HGB BLD-MCNC: 12.7 G/DL (ref 12–16)
IMM GRANULOCYTES # BLD AUTO: 0.01 K/UL (ref 0–0.04)
IMM GRANULOCYTES NFR BLD AUTO: 0.2 % (ref 0–0.5)
LDLC SERPL CALC-MCNC: 182.2 MG/DL (ref 63–159)
LYMPHOCYTES # BLD AUTO: 1.9 K/UL (ref 1–4.8)
LYMPHOCYTES NFR BLD: 40.7 % (ref 18–48)
MCH RBC QN AUTO: 29.5 PG (ref 27–31)
MCHC RBC AUTO-ENTMCNC: 32.1 G/DL (ref 32–36)
MCV RBC AUTO: 92 FL (ref 82–98)
MONOCYTES # BLD AUTO: 0.3 K/UL (ref 0.3–1)
MONOCYTES NFR BLD: 7.4 % (ref 4–15)
NEUTROPHILS # BLD AUTO: 2.3 K/UL (ref 1.8–7.7)
NEUTROPHILS NFR BLD: 50.4 % (ref 38–73)
NONHDLC SERPL-MCNC: 194 MG/DL
NRBC BLD-RTO: 0 /100 WBC
PLATELET # BLD AUTO: 195 K/UL (ref 150–450)
PMV BLD AUTO: 10.8 FL (ref 9.2–12.9)
POTASSIUM SERPL-SCNC: 3.8 MMOL/L (ref 3.5–5.1)
PROT SERPL-MCNC: 7.9 G/DL (ref 6–8.4)
RBC # BLD AUTO: 4.31 M/UL (ref 4–5.4)
SODIUM SERPL-SCNC: 139 MMOL/L (ref 136–145)
TRIGL SERPL-MCNC: 59 MG/DL (ref 30–150)
TSH SERPL DL<=0.005 MIU/L-ACNC: 0.92 UIU/ML (ref 0.4–4)
VIT B12 SERPL-MCNC: 742 PG/ML (ref 210–950)
WBC # BLD AUTO: 4.6 K/UL (ref 3.9–12.7)

## 2025-01-13 PROCEDURE — 80061 LIPID PANEL: CPT | Performed by: FAMILY MEDICINE

## 2025-01-13 PROCEDURE — 1159F MED LIST DOCD IN RCRD: CPT | Mod: CPTII,S$GLB,, | Performed by: FAMILY MEDICINE

## 2025-01-13 PROCEDURE — 1101F PT FALLS ASSESS-DOCD LE1/YR: CPT | Mod: CPTII,S$GLB,, | Performed by: FAMILY MEDICINE

## 2025-01-13 PROCEDURE — 3079F DIAST BP 80-89 MM HG: CPT | Mod: CPTII,S$GLB,, | Performed by: FAMILY MEDICINE

## 2025-01-13 PROCEDURE — 1126F AMNT PAIN NOTED NONE PRSNT: CPT | Mod: CPTII,S$GLB,, | Performed by: FAMILY MEDICINE

## 2025-01-13 PROCEDURE — 3077F SYST BP >= 140 MM HG: CPT | Mod: CPTII,S$GLB,, | Performed by: FAMILY MEDICINE

## 2025-01-13 PROCEDURE — 80053 COMPREHEN METABOLIC PANEL: CPT | Performed by: FAMILY MEDICINE

## 2025-01-13 PROCEDURE — 84443 ASSAY THYROID STIM HORMONE: CPT | Performed by: FAMILY MEDICINE

## 2025-01-13 PROCEDURE — 1160F RVW MEDS BY RX/DR IN RCRD: CPT | Mod: CPTII,S$GLB,, | Performed by: FAMILY MEDICINE

## 2025-01-13 PROCEDURE — 99397 PER PM REEVAL EST PAT 65+ YR: CPT | Mod: S$GLB,,, | Performed by: FAMILY MEDICINE

## 2025-01-13 PROCEDURE — 3288F FALL RISK ASSESSMENT DOCD: CPT | Mod: CPTII,S$GLB,, | Performed by: FAMILY MEDICINE

## 2025-01-13 PROCEDURE — 85025 COMPLETE CBC W/AUTO DIFF WBC: CPT | Performed by: FAMILY MEDICINE

## 2025-01-13 PROCEDURE — 82728 ASSAY OF FERRITIN: CPT | Performed by: FAMILY MEDICINE

## 2025-01-13 PROCEDURE — 3044F HG A1C LEVEL LT 7.0%: CPT | Mod: CPTII,S$GLB,, | Performed by: FAMILY MEDICINE

## 2025-01-13 PROCEDURE — 3008F BODY MASS INDEX DOCD: CPT | Mod: CPTII,S$GLB,, | Performed by: FAMILY MEDICINE

## 2025-01-13 PROCEDURE — 83036 HEMOGLOBIN GLYCOSYLATED A1C: CPT | Performed by: FAMILY MEDICINE

## 2025-01-13 PROCEDURE — 99999 PR PBB SHADOW E&M-EST. PATIENT-LVL IV: CPT | Mod: PBBFAC,,, | Performed by: FAMILY MEDICINE

## 2025-01-13 PROCEDURE — 82607 VITAMIN B-12: CPT | Mod: GA | Performed by: FAMILY MEDICINE

## 2025-01-13 NOTE — PROGRESS NOTES
Ambar Duenas    Chief Complaint   Patient presents with    Annual Exam       History of Present Illness:   HPI    HISTORY OF PRESENT ILLNESS: Ambar Duenas is a 74 y.o. female comes in today for annual wellness examination.  She states she is fasting and has not taken medications today.    She states she performs home blood pressure checks daily with levels ranging 127-130/80 (135/88 this morning).  She states she monitors her diet.  She states she exercises 2 times a week, 60 minutes each time with dancing.    She states she occasionally performs monthly breast self-examination.    She complains of having pain at her hips (right more than left) after sitting for awhile but states pain resolves with walking.    5/22/2024 hip x-ray Mild bilateral hip degenerative joint disease. See above.      END OF LIFE DECISION: She does have a living will. She does not desire life support.    SCREENINGS:  Cholesterol: 5/23/2024  FFS/Colonoscopy: 6/28/2023.  Mammogram: In the past per patient.  GYN Exam: 4/2024 with GYN Dr. CAROL West.  Dexa Scan: 5/17/2023 with GYN Dr. CAROL West.  Eye Exam: February 2024 with [facility]. Scheduled for January 21, 2025.  Dental Exam:  May 2024.  PPD: Negative in the past.  HCVAb: 3/8/2022.  HIVAb: Unsure.    Immunization History   Administered Date(s) Administered    COVID-19, MRNA, LN-S, PF (Pfizer) (Purple Cap) 03/20/2021, 04/10/2021, 01/11/2022   Tdap: > 10 years ago per patient.  Advised patient insurance-covered benefit only at pharmacy.  She declines.  Zostavax: Never. She declines.  Shingrix: Never. She declines.  Pneumovax: Never.  Prevnar: Never. She declines.  Seasonal Flu: She declines.  Hepatitis B vaccines: She declines.  RSV: Never. Advised patient available at local pharmacy.    Current Outpatient Medications   Medication Sig    amLODIPine (NORVASC) 5 MG tablet Take 1 tablet (5 mg total) by mouth once daily.    montelukast (SINGULAIR) 10 mg tablet Take 1 tablet (10  mg total) by mouth every evening.    pantoprazole (PROTONIX) 40 MG tablet Take 1 tablet (40 mg total) by mouth 2 (two) times daily. (Patient taking differently: Take 40 mg by mouth daily as needed.)       Review of Systems   Constitutional:  Negative for activity change, appetite change, chills, fatigue, fever and unexpected weight change.   HENT:  Negative for congestion, ear discharge, ear pain, hearing loss, mouth sores, nosebleeds, postnasal drip, rhinorrhea, sinus pressure, sneezing, sore throat, trouble swallowing and voice change.    Eyes:  Negative for photophobia, pain, discharge, redness, itching and visual disturbance.   Respiratory:  Negative for cough, chest tightness, shortness of breath and wheezing.    Cardiovascular:  Negative for chest pain, palpitations and leg swelling.   Gastrointestinal:  Negative for abdominal pain, blood in stool, constipation, diarrhea, nausea and vomiting.   Endocrine: Negative for cold intolerance, heat intolerance, polydipsia, polyphagia and polyuria.   Genitourinary:  Negative for difficulty urinating, dysuria, flank pain, frequency, hematuria, menstrual problem, urgency, vaginal bleeding and vaginal discharge.   Musculoskeletal:  Positive for myalgias. Negative for arthralgias, back pain, gait problem, joint swelling and neck pain.   Skin:  Negative for color change, pallor and rash.   Neurological:  Negative for dizziness, tremors, seizures, weakness, numbness and headaches.   Hematological:  Negative for adenopathy. Does not bruise/bleed easily.   Psychiatric/Behavioral:  Negative for dysphoric mood, sleep disturbance and suicidal ideas. The patient is not nervous/anxious.         Negative for homicidal ideas.       Objective:  Physical Exam  Vitals reviewed.   Constitutional:       General: She is not in acute distress.     Appearance: Normal appearance. She is well-developed. She is obese. She is not ill-appearing, toxic-appearing or diaphoretic.      Comments:  Pleasant.   HENT:      Head: Normocephalic and atraumatic.      Right Ear: Tympanic membrane, ear canal and external ear normal. There is no impacted cerumen.      Left Ear: Tympanic membrane, ear canal and external ear normal. There is no impacted cerumen.      Nose: Nose normal. No congestion or rhinorrhea.      Mouth/Throat:      Mouth: Mucous membranes are moist.      Pharynx: Oropharynx is clear. No oropharyngeal exudate or posterior oropharyngeal erythema.   Eyes:      General:         Right eye: No discharge.         Left eye: No discharge.      Extraocular Movements: Extraocular movements intact.      Conjunctiva/sclera: Conjunctivae normal.      Pupils: Pupils are equal, round, and reactive to light.   Neck:      Thyroid: No thyromegaly.      Vascular: No carotid bruit or JVD.   Cardiovascular:      Rate and Rhythm: Normal rate and regular rhythm.      Pulses:           Dorsalis pedis pulses are 3+ on the right side and 3+ on the left side.      Heart sounds: Normal heart sounds. No murmur heard.     No friction rub. No gallop.   Pulmonary:      Effort: Pulmonary effort is normal. No respiratory distress.      Breath sounds: Normal breath sounds. No wheezing.   Chest:      Comments: Not examined.  Abdominal:      General: Bowel sounds are normal. There is no distension.      Palpations: Abdomen is soft. There is no mass.      Tenderness: There is no abdominal tenderness. There is no guarding or rebound.   Genitourinary:     Comments: Not examined.  Musculoskeletal:         General: No swelling or tenderness. Normal range of motion.      Cervical back: Normal range of motion and neck supple. No rigidity or tenderness.      Comments: She is ambulatory without problems.   Feet:      Right foot:      Protective Sensation: 5 sites tested.  5 sites sensed.      Skin integrity: No ulcer or skin breakdown.      Left foot:      Protective Sensation: 5 sites tested.  5 sites sensed.      Skin integrity: No ulcer or  skin breakdown.   Lymphadenopathy:      Cervical: No cervical adenopathy.   Skin:     General: Skin is warm.      Findings: No rash.   Neurological:      General: No focal deficit present.      Mental Status: She is alert and oriented to person, place, and time.      Cranial Nerves: No cranial nerve deficit.      Deep Tendon Reflexes: Reflexes are normal and symmetric. Reflexes normal.   Psychiatric:         Mood and Affect: Mood normal.         Behavior: Behavior normal.         Thought Content: Thought content normal.         Judgment: Judgment normal.         ASSESSMENT:  1. Annual physical exam    2. Essential hypertension    3. Prediabetes    4. Microcytic anemia    5. Elevated vitamin B12 level    6. Malignant neoplasm of colon, unspecified part of colon    7. Obesity (BMI 30.0-34.9)    8. Postmenopausal        PLAN:  Ambar was seen today for annual exam.    Diagnoses and all orders for this visit:    Annual physical exam    Essential hypertension - on medication  -     Lipid Panel; Future  -     Comprehensive Metabolic Panel; Future  -     CBC Auto Differential; Future  -     TSH; Future    Prediabetes - managed with diet and exercise  -     Hemoglobin A1C; Future    Microcytic anemia  -     Ferritin; Future    Elevated vitamin B12 level  -     Vitamin B12; Future    Malignant neoplasm of colon, unspecified part of colon - stable without symptoms and reports cancer-free for 3 years    Obesity (BMI 30.0-34.9)    Postmenopausal      Patient advised to call for results.  Continue current medications, follow low sodium, low cholesterol, low carb diet, daily walks, monthly BSE.  Keep follow up with specialists.  Follow up in about 6 months (around 7/13/2025) for hypertension and prediabetes follow up.    40 minutes of total time spent on the encounter, which includes face to face time and non-face to face time preparing to see the patient (eg, review of tests), Obtaining and/or reviewing separately obtained  history, Documenting clinical information in the electronic or other health record, Independently interpreting results (not separately reported) and communicating results to the patient/family/caregiver, or Care coordination (not separately reported).

## 2025-01-16 ENCOUNTER — LAB VISIT (OUTPATIENT)
Dept: LAB | Facility: HOSPITAL | Age: 75
End: 2025-01-16
Attending: COLON & RECTAL SURGERY
Payer: MEDICARE

## 2025-01-16 DIAGNOSIS — Z85.038 PERSONAL HISTORY OF COLON CANCER: ICD-10-CM

## 2025-01-16 LAB — CEA SERPL-MCNC: 2.3 NG/ML (ref 0–5)

## 2025-01-16 PROCEDURE — 82378 CARCINOEMBRYONIC ANTIGEN: CPT | Performed by: COLON & RECTAL SURGERY

## 2025-01-16 PROCEDURE — 36415 COLL VENOUS BLD VENIPUNCTURE: CPT | Performed by: COLON & RECTAL SURGERY

## 2025-01-28 ENCOUNTER — TELEPHONE (OUTPATIENT)
Dept: SPORTS MEDICINE | Facility: CLINIC | Age: 75
End: 2025-01-28
Payer: MEDICARE

## 2025-01-28 NOTE — TELEPHONE ENCOUNTER
----- Message from Anand sent at 1/27/2025  3:47 PM CST -----  Contact: pt @ [phone]  Name of Who is Calling: pt        What is the request in detail: calling to schedule a f/u appt earlier than march if possible        Can the clinic reply by [facility]: no        What Number to Call Back if not in [place]: [phone]

## 2025-02-05 ENCOUNTER — TELEPHONE (OUTPATIENT)
Dept: ORTHOPEDICS | Facility: CLINIC | Age: 75
End: 2025-02-05
Payer: MEDICARE

## 2025-02-05 NOTE — TELEPHONE ENCOUNTER
----- Message from Amirahidaniaprimitivo sent at 2/4/2025  4:31 PM CST -----  Contact: [phone]  Type:  Sooner Apoointment Request    Caller is requesting a sooner appointment.  Caller declined first available appointment listed below.  Caller will not accept being placed on the waitlist and is requesting a message be sent to doctor.  Name of Caller:HERNAN LAWS [[id number]]  When is the first available appointment?as soon as possible   Symptoms:check up on her hip and knee  Would the patient rather a call back or a response via [facility]? CALL BACK or portal  Best Call Back Number: [phone]  Additional Information: MRN [id number]

## 2025-02-12 ENCOUNTER — TELEPHONE (OUTPATIENT)
Dept: FAMILY MEDICINE | Facility: CLINIC | Age: 75
End: 2025-02-12
Payer: MEDICARE

## 2025-02-12 DIAGNOSIS — E78.5 HYPERLIPIDEMIA, UNSPECIFIED HYPERLIPIDEMIA TYPE: Primary | ICD-10-CM

## 2025-02-12 DIAGNOSIS — T46.6X5A MYALGIA DUE TO STATIN: ICD-10-CM

## 2025-02-12 DIAGNOSIS — M79.10 MYALGIA DUE TO STATIN: ICD-10-CM

## 2025-02-12 RX ORDER — ATORVASTATIN CALCIUM 20 MG/1
20 TABLET, FILM COATED ORAL NIGHTLY
Qty: 30 TABLET | Refills: 2 | Status: SHIPPED | OUTPATIENT
Start: 2025-02-12 | End: 2025-02-12

## 2025-02-12 RX ORDER — EZETIMIBE 10 MG/1
10 TABLET ORAL DAILY
Qty: 30 TABLET | Refills: 2 | Status: SHIPPED | OUTPATIENT
Start: 2025-02-12

## 2025-02-12 NOTE — TELEPHONE ENCOUNTER
Called pt with lab results. She verbally understood. Said she took Lipitor years ago and was taken off bc of the pain it gave her she couldn't take it. Wants to know if there is something else she can take. Please advise.

## 2025-02-12 NOTE — TELEPHONE ENCOUNTER
Okay. I have put the following orders and/or medications to this note.  Please advise pt and assist.    No orders of the defined types were placed in this encounter.      Medications Ordered This Encounter   Medications    ezetimibe (ZETIA) 10 mg tablet     Sig: Take 1 tablet (10 mg total) by mouth once daily.     Dispense:  30 tablet     Refill:  2        Double Island Pedicle Flap Text: The defect edges were debeveled with a #15 scalpel blade.  Given the location of the defect, shape of the defect and the proximity to free margins a double island pedicle advancement flap was deemed most appropriate.  Using a sterile surgical marker, an appropriate advancement flap was drawn incorporating the defect, outlining the appropriate donor tissue and placing the expected incisions within the relaxed skin tension lines where possible.    The area thus outlined was incised deep to adipose tissue with a #15 scalpel blade.  The skin margins were undermined to an appropriate distance in all directions around the primary defect and laterally outward around the island pedicle utilizing iris scissors.  There was minimal undermining beneath the pedicle flap.

## 2025-02-12 NOTE — TELEPHONE ENCOUNTER
Advise pt lab results are within acceptable range except  high cholesterol levels and need medication for treatment. Please continue current medications but let's add Lipitor 20 mg nightly to lower cholesterol - works through liver and some times may cause muscle pain. Needs 3-month fasting f/u appt w/me for high cholesterol. Thanks.    I have put the following orders and/or medications to this note.  Please advise pt and assist.    No orders of the defined types were placed in this encounter.      Medications Ordered This Encounter   Medications    atorvastatin (LIPITOR) 20 MG tablet     Sig: Take 1 tablet (20 mg total) by mouth every evening.     Dispense:  30 tablet     Refill:  2

## 2025-02-12 NOTE — TELEPHONE ENCOUNTER
----- Message from Fatuma sent at 2/12/2025  2:42 PM CST -----  Contact: self  .Type:  Patient Returning Call    Who Called:.Ambar Duenas  Who Left Message for Patient:Jennifer  Does the patient know what this is regarding?:yes  Would the patient rather a call back or a response via [facility]? Call back  Best Call Back Number:.[phone]  Additional Information: pt is returning a phone call

## 2025-02-18 ENCOUNTER — OFFICE VISIT (OUTPATIENT)
Dept: SPORTS MEDICINE | Facility: CLINIC | Age: 75
End: 2025-02-18
Payer: MEDICARE

## 2025-02-18 ENCOUNTER — HOSPITAL ENCOUNTER (OUTPATIENT)
Dept: RADIOLOGY | Facility: HOSPITAL | Age: 75
Discharge: HOME | End: 2025-02-18
Attending: STUDENT IN AN ORGANIZED HEALTH CARE EDUCATION/TRAINING PROGRAM
Payer: MEDICARE

## 2025-02-18 VITALS — HEIGHT: 70 IN | BODY MASS INDEX: 31.82 KG/M2 | WEIGHT: 222.25 LBS

## 2025-02-18 DIAGNOSIS — M54.16 BILATERAL LUMBAR RADICULOPATHY: Primary | ICD-10-CM

## 2025-02-18 DIAGNOSIS — M54.50 LUMBAR PAIN: ICD-10-CM

## 2025-02-18 PROCEDURE — 72100 X-RAY EXAM L-S SPINE 2/3 VWS: CPT | Mod: TC,PN

## 2025-02-18 NOTE — PROGRESS NOTES
Patient ID: Ambar Duenas  YOB: 1950  MRN: [id number]    Chief Complaint: Pain of the Right Hip and Pain of the Left Knee      History of Present Illness: Ambar Duenas is a 74-year-old female presenting today for follow-up for bilateral hip muscle stiffness and pain.  She describes a radiating pain coming from right lower back down the lateral aspect of her leg and then across the anterior aspect of her thigh and quadriceps.  She also describes some pain on the right lateral shin that occurs at times when she leans forward.  She has had this complaint on and off for some time and has done quite a bit of physical therapy and stretching over the past few years and has not seen significant improvement.  She has some mild arthritis of the hips but has no significant groin pain associated.  She also has some mild arthritis in the knee which was left greater than right but has improved as well and is able to do stairs well without much issues.  She notes pain can occur on and on with activity or without.    Past Medical History:   Past Medical History:   Diagnosis Date    Anemia, unspecified     Fibroids     GERD (gastroesophageal reflux disease)     History of colon cancer     Hyperlipidemia     Hypertension     Postmenopausal      Past Surgical History:   Procedure Laterality Date    COLONOSCOPY N/A 3/28/2022    Procedure: COLONOSCOPY;  Surgeon: Melanie Ballard MD;  Location: [facility];  Service: Endoscopy;  Laterality: N/A;    COLONOSCOPY N/A 6/28/2023    Procedure: COLONOSCOPY;  Surgeon: Maury Liu MD;  Location: [facility];  Service: General;  Laterality: N/A;    ESOPHAGOGASTRODUODENOSCOPY N/A 3/28/2022    Procedure: ESOPHAGOGASTRODUODENOSCOPY (EGD);  Surgeon: Melanie Ballard MD;  Location: [facility];  Service: Endoscopy;  Laterality: N/A;    HEMORRHOID SURGERY      HYSTEROSCOPY WITH DILATION AND CURETTAGE OF UTERUS N/A 5/10/2022    Procedure: HYSTEROSCOPY, WITH DILATION  AND CURETTAGE OF UTERUS;  Surgeon: Madelyn West MD;  Location: [facility];  Service: OB/GYN;  Laterality: N/A;    INJECTION OF ANESTHETIC AGENT INTO TISSUE PLANE DEFINED BY TRANSVERSUS ABDOMINIS MUSCLE N/A 5/10/2022    Procedure: BLOCK, TRANSVERSUS ABDOMINIS PLANE;  Surgeon: Maury Liu MD;  Location: [facility];  Service: General;  Laterality: N/A;    LIVER BIOPSY  5/10/2022    Procedure: BIOPSY, LIVER;  Surgeon: Maury Liu MD;  Location: [facility];  Service: General;;    OMENTECTOMY  5/10/2022    Procedure: OMENTECTOMY;  Surgeon: Maury Liu MD;  Location: [facility] OR;  Service: General;;  partial    PARTIAL GASTRECTOMY N/A 5/10/2022    Procedure: GASTRECTOMY, PARTIAL;  Surgeon: Maury Liu MD;  Location: [facility];  Service: General;  Laterality: N/A;    SUBTOTAL COLECTOMY Left 5/10/2022    Procedure: COLECTOMY, PARTIAL;  Surgeon: Maury Liu MD;  Location: [facility];  Service: General;  Laterality: Left;  LEFT VS SUBTOTAl vs EXTENDED RIGHT     Family History   Problem Relation Name Age of Onset    No Known Problems Mother      No Known Problems Father       Social History[1]  Medication List with Changes/Refills   Current Medications    AMLODIPINE (NORVASC) 5 MG TABLET    Take 1 tablet (5 mg total) by mouth once daily.    EZETIMIBE (ZETIA) 10 MG TABLET    Take 1 tablet (10 mg total) by mouth once daily.    MONTELUKAST (SINGULAIR) 10 MG TABLET    Take 1 tablet (10 mg total) by mouth every evening.    PANTOPRAZOLE (PROTONIX) 40 MG TABLET    Take 1 tablet (40 mg total) by mouth 2 (two) times daily.     Review of patient's allergies indicates:  No Known Allergies    Physical Exam:   Body mass index is 31.89 kg/m².    GENERAL: Well appearing, in no acute distress.  HEAD: Normocephalic and atraumatic.  ENT: External ears and nose grossly normal.  EYES: EOMI bilaterally  PULMONARY: Respirations are grossly even and non-labored.  NEURO: Awake, alert, and oriented x 3.  SKIN: No obvious rashes  appreciated.  PSYCH: Mood & affect are appropriate.    Detailed MSK exam:     No significant tenderness in lumbar spine negative straight leg raise bilaterally mild tenderness of the trochanter good range of motion with internal rotation symmetric both hips.  Sensation grossly intact throughout lower extremities no red flags.    Imaging:  CT Chest Abdomen Pelvis With IV Contrast (XPD) Routine Oral Contrast  Narrative: EXAMINATION:  CT CHEST ABDOMEN PELVIS WITH IV CONTRAST (XPD)    CLINICAL HISTORY:  previous colon cancer; eval mets/recurrance;Personal history of other malignant neoplasm of large intestine    TECHNIQUE:  Images from the lung apices to the ischial tuberosities were acquired after administration of 100cc of Omnipaque 350 IV contrast material.  Sagittal and Coronal multiplanar reconstructions (MPR) were performed. Positive oral contrast (1000ml of Omnipaque 12 oral solution) was used for a better assessment of the gastrointestinal tract.    COMPARISON:  05/13/2024; 11/15/2023    FINDINGS:  CT Chest:    Lower neck and chest wall: No supraclavicular or axillary lymphadenopathy is seen.    Lungs and pleura: No new nodules or masses.  Otherwise grossly unchanged.  Right middle lobe granuloma.    Mediastinum and darrick: Unchanged    Heart and pericardium: Heart size is normal. No pericardial effusion.    CT abdomen and pelvis:    Liver: Normal.    Gallbladder and biliary tree: No high-density gallstones. Normal gallbladder wall thickness.No intra- or extrahepatic biliary ductal dilation.    Spleen: Normal.    Pancreas: Normal.    Adrenals: Normal.    Kidneys and ureters: Punctate right nephrolithiasis without evidence of hydronephrosis.    Bowel: No evidence of mechanical bowel obstruction.    Vessels: Mild-to-moderate atheromatous disease.    Lymph nodes: Unchanged    Peritoneum: No ascites or free air.    Bladder: Normal.    Reproductive organs: No pelvic masses.    Abdominal wall: There are 2 containing  hernia in the anterior abdominal wall near midline just below the liver edge.  Additionally, there is a bowel containing periumbilical hernia.  Contrast moves through the bowel without definitive evidence of strangulation.    Bones: Multilevel degenerative changes of the spine.  Degenerative changes of the bilateral SI joints.  Lumbar facet arthropathy.  Impression: 1. Grossly unchanged evaluation without evidence of recurrent or metastatic disease.  2. Multiple ventral hernias.  Bowel containing periumbilical hernia without definitive strangulation.    Electronically signed by: Jarrell Gill  Date:    12/17/2024  Time:    10:07      Relevant imaging results were reviewed and interpreted by me and per my read as above.  This was discussed with the patient and / or family today.     Assessment:  Ambar Duenas is a 74 y.o. female presents today for persistent complaint of hip and thigh stiffness.  On review of a previous CT abdomen and pelvis she does have significant lumbar spondylolysis and degenerative disc changes throughout L4-L5 and L5-S1.  With her bilateral symptoms and continued pain with physical therapy and other conservative treatments I would like to get a formal x-ray of her low back as well as a nerve conduction study test to rule out neuropathic type pain pattern.  I will defer any injections in her hip or her knee at this time follow-up after her EMG.    Bilateral lumbar radiculopathy  -     EMG W/ ULTRASOUND AND NERVE CONDUCTION TEST 2 Extremities; Future    Lumbar pain  -     X-Ray Lumbar Spine AP And Lateral; Future; Expected date: 02/18/2025  -     EMG W/ ULTRASOUND AND NERVE CONDUCTION TEST 2 Extremities; Future           Mumtaz Darling MD    Disclaimer: This note was prepared using a voice recognition system and is likely to have sound alike errors within the text.          [1]   Social History  Socioeconomic History    Marital status:    Tobacco Use    Smoking status: Never     Passive  exposure: Never    Smokeless tobacco: Never   Substance and Sexual Activity    Alcohol use: Yes     Alcohol/week: 2.0 standard drinks of alcohol     Types: 2 Glasses of wine per week     Comment: occ    Drug use: Never    Sexual activity: Not Currently     Partners: Male     Birth control/protection: Post-menopausal   Social History Narrative    She wears seatbelt.     Social Drivers of Health     Physical Activity: Insufficiently Active (1/13/2025)    Exercise Vital Sign     Days of Exercise per Week: 2 days     Minutes of Exercise per Session: 60 min

## 2025-02-19 ENCOUNTER — TELEPHONE (OUTPATIENT)
Dept: PHYSICAL MEDICINE AND REHAB | Facility: CLINIC | Age: 75
End: 2025-02-19
Payer: MEDICARE

## 2025-02-20 ENCOUNTER — TELEPHONE (OUTPATIENT)
Dept: SPORTS MEDICINE | Facility: CLINIC | Age: 75
End: 2025-02-20
Payer: MEDICARE

## 2025-02-20 ENCOUNTER — TELEPHONE (OUTPATIENT)
Dept: PHYSICAL MEDICINE AND REHAB | Facility: CLINIC | Age: 75
End: 2025-02-20
Payer: MEDICARE

## 2025-02-20 NOTE — TELEPHONE ENCOUNTER
Spoke to pt and advised that per Dr Oneill she should have EMG done rather than an xray on her leg for better results. Pt voiced verbal understanding.

## 2025-02-20 NOTE — TELEPHONE ENCOUNTER
----- Message from Tia sent at 2/20/2025 10:06 AM CST -----  Who Called: PtWhat is the request in detail: Requesting call back to discuss needing order for leg xray, pt was told she needed to do it after her appt Tuesday. Please adviseCan the clinic reply by [facility]? Philippe Call Back Number: [phone] Information:

## 2025-02-20 NOTE — TELEPHONE ENCOUNTER
Pt stated that an xray of her back and hip was done on Tuesday but she would like an order placed to have an xray done on her right leg because that's where most of her pain is. Should pt wait to have EMG done instead? Please advise thanks.

## 2025-03-03 ENCOUNTER — OFFICE VISIT (OUTPATIENT)
Dept: FAMILY MEDICINE | Facility: CLINIC | Age: 75
End: 2025-03-03
Attending: FAMILY MEDICINE
Payer: MEDICARE

## 2025-03-03 ENCOUNTER — HOSPITAL ENCOUNTER (OUTPATIENT)
Dept: RADIOLOGY | Facility: HOSPITAL | Age: 75
Discharge: HOME | End: 2025-03-03
Attending: FAMILY MEDICINE
Payer: MEDICARE

## 2025-03-03 VITALS
DIASTOLIC BLOOD PRESSURE: 88 MMHG | HEART RATE: 84 BPM | BODY MASS INDEX: 31.75 KG/M2 | SYSTOLIC BLOOD PRESSURE: 138 MMHG | OXYGEN SATURATION: 96 % | WEIGHT: 221.81 LBS | HEIGHT: 70 IN | TEMPERATURE: 99 F

## 2025-03-03 DIAGNOSIS — M79.661 PAIN IN RIGHT LOWER LEG: ICD-10-CM

## 2025-03-03 DIAGNOSIS — M25.652 HIP JOINT STIFFNESS, BILATERAL: ICD-10-CM

## 2025-03-03 DIAGNOSIS — M25.651 HIP JOINT STIFFNESS, BILATERAL: ICD-10-CM

## 2025-03-03 DIAGNOSIS — M25.652 HIP JOINT STIFFNESS, BILATERAL: Primary | ICD-10-CM

## 2025-03-03 DIAGNOSIS — M25.551 BILATERAL HIP PAIN: ICD-10-CM

## 2025-03-03 DIAGNOSIS — M25.552 BILATERAL HIP PAIN: ICD-10-CM

## 2025-03-03 DIAGNOSIS — M25.651 HIP JOINT STIFFNESS, BILATERAL: Primary | ICD-10-CM

## 2025-03-03 PROCEDURE — 73521 X-RAY EXAM HIPS BI 2 VIEWS: CPT | Mod: 26,,, | Performed by: RADIOLOGY

## 2025-03-03 PROCEDURE — 1160F RVW MEDS BY RX/DR IN RCRD: CPT | Mod: CPTII,S$GLB,, | Performed by: FAMILY MEDICINE

## 2025-03-03 PROCEDURE — 1159F MED LIST DOCD IN RCRD: CPT | Mod: CPTII,S$GLB,, | Performed by: FAMILY MEDICINE

## 2025-03-03 PROCEDURE — 73590 X-RAY EXAM OF LOWER LEG: CPT | Mod: TC,FY,PO,RT

## 2025-03-03 PROCEDURE — 1101F PT FALLS ASSESS-DOCD LE1/YR: CPT | Mod: CPTII,S$GLB,, | Performed by: FAMILY MEDICINE

## 2025-03-03 PROCEDURE — G2211 COMPLEX E/M VISIT ADD ON: HCPCS | Mod: S$GLB,,, | Performed by: FAMILY MEDICINE

## 2025-03-03 PROCEDURE — 1126F AMNT PAIN NOTED NONE PRSNT: CPT | Mod: CPTII,S$GLB,, | Performed by: FAMILY MEDICINE

## 2025-03-03 PROCEDURE — 73521 X-RAY EXAM HIPS BI 2 VIEWS: CPT | Mod: TC,FY,PO

## 2025-03-03 PROCEDURE — 3075F SYST BP GE 130 - 139MM HG: CPT | Mod: CPTII,S$GLB,, | Performed by: FAMILY MEDICINE

## 2025-03-03 PROCEDURE — 3008F BODY MASS INDEX DOCD: CPT | Mod: CPTII,S$GLB,, | Performed by: FAMILY MEDICINE

## 2025-03-03 PROCEDURE — 3079F DIAST BP 80-89 MM HG: CPT | Mod: CPTII,S$GLB,, | Performed by: FAMILY MEDICINE

## 2025-03-03 PROCEDURE — 3288F FALL RISK ASSESSMENT DOCD: CPT | Mod: CPTII,S$GLB,, | Performed by: FAMILY MEDICINE

## 2025-03-03 PROCEDURE — 99999 PR PBB SHADOW E&M-EST. PATIENT-LVL IV: CPT | Mod: PBBFAC,,, | Performed by: FAMILY MEDICINE

## 2025-03-03 PROCEDURE — 3044F HG A1C LEVEL LT 7.0%: CPT | Mod: CPTII,S$GLB,, | Performed by: FAMILY MEDICINE

## 2025-03-03 PROCEDURE — 99213 OFFICE O/P EST LOW 20 MIN: CPT | Mod: S$GLB,,, | Performed by: FAMILY MEDICINE

## 2025-03-03 PROCEDURE — 73590 X-RAY EXAM OF LOWER LEG: CPT | Mod: 26,RT,, | Performed by: RADIOLOGY

## 2025-03-03 NOTE — PROGRESS NOTES
Amabr Duenas    Chief Complaint   Patient presents with    leg Xray       History of Present Illness:   Ms. Duenas comes in today requesting x-rays.    She saw Dr. Mumtaz Darling, orthopedist, on February 18, 2025 for bilateral lumbar radiculopathy, lumbar pain with complaint of having bilateral hip muscle stiffness and pain. Per his note as he reviewed her previous CT abdomen and pelvis, he noticed she has significant lumbar spondylolysis and degenerative disc changes throughout L4-L5 and L5-S1 and as she has bilateral symptoms and continued pain with physical therapy and other conservative treatments, he ordered lumbar spine x-ray and nerve conduction study test to rule out neuropathic type pain pattern and deferred any injections in her hip or her knee at that time pending results.  She states she had low back x-ray performed but has not yet had nerve conduction study.  However, she desires to have x-rays of both of her hips.  She states she feels stiffness and pain at both of her hips and legs with getting up after sitting for 15 minutes or longer.    She also desires to have x-ray of her right lower leg as she states sometimes she has pain at her right lower leg with standing.  She states she walks a lot.  She denies having associated trauma.    She states she has not taken medications today.  She states she performs home blood pressure checks daily with levels ranging 120-128/80's.    Otherwise, she denies having fever, chills, fatigue, appetite changes; shortness of breath, cough, wheezing; chest pain, palpitations, leg swelling; abdominal pain, nausea, vomiting, diarrhea, constipation; unusual urinary symptoms; polydipsia, polyphagia, polyuria, hot or cold intolerance; back pain today; numbness, headache; anxiety, depression, homicidal or suicidal thoughts.     Labs:                    WBC                      4.60                01/13/2025                 HGB                      12.7                 01/13/2025                 HCT                      39.6                01/13/2025                 PLT                      195                 01/13/2025                 CHOL                     252 (H)             01/13/2025                 TRIG                     59                  01/13/2025                 HDL                      58                  01/13/2025                 ALT                      12                  01/13/2025                 AST                      17                  01/13/2025                 NA                       139                 01/13/2025                 K                        3.8                 01/13/2025                 CL                       105                 01/13/2025                 CREATININE               0.7                 01/13/2025                 BUN                      16                  01/13/2025                 CO2                      25                  01/13/2025                 TSH                      0.915               01/13/2025                 HGBA1C                   5.6                 01/13/2025               LDLCALC                  182.2 (H)           01/13/2025 02/18/2025 lumbar spine x-ray:  FINDINGS:  The vertebral bodies demonstrate a normal height.  There is grade 2 spondylolisthesis of L4 on L5. Mild disc space narrowing seen at the L2-3 through the L5-S1 levels.  Prominent bilateral facet arthropathy noted within the mid to lower lumbar spine.  There is mild levocurvature of the lumbar spine.  There is a calcification seen projecting over the transverse process of L4 on the left that measures approximately 6 mm in size and is most consistent with a phlebolith and is confirmed on a CT from 12/17/2024.        Current Outpatient Medications   Medication Sig    amLODIPine (NORVASC) 5 MG tablet Take 1 tablet (5 mg total) by mouth once daily.    ezetimibe (ZETIA) 10 mg tablet Take 1 tablet (10 mg total) by mouth once  daily.    montelukast (SINGULAIR) 10 mg tablet Take 1 tablet (10 mg total) by mouth every evening.    pantoprazole (PROTONIX) 40 MG tablet Take 1 tablet (40 mg total) by mouth 2 (two) times daily. (Patient taking differently: Take 40 mg by mouth daily as needed.)       Review of Systems   Constitutional:  Negative for activity change, appetite change, chills, fatigue and fever.   Respiratory:  Negative for cough, shortness of breath and wheezing.    Cardiovascular:  Negative for chest pain, palpitations and leg swelling.   Gastrointestinal:  Negative for abdominal pain, constipation, diarrhea, nausea and vomiting.   Endocrine: Negative for cold intolerance, heat intolerance, polydipsia, polyphagia and polyuria.   Genitourinary:  Negative for difficulty urinating.   Musculoskeletal:  Positive for arthralgias and myalgias. Negative for back pain.   Neurological:  Negative for numbness and headaches.   Psychiatric/Behavioral:  Negative for dysphoric mood and suicidal ideas. The patient is not nervous/anxious.         Negative for homicidal ideas.       Objective:  Physical Exam  Vitals reviewed.   Constitutional:       General: She is not in acute distress.     Appearance: Normal appearance. She is obese. She is not ill-appearing, toxic-appearing or diaphoretic.      Comments: Pleasant.   Cardiovascular:      Rate and Rhythm: Normal rate and regular rhythm.      Pulses:           Dorsalis pedis pulses are 3+ on the right side and 3+ on the left side.      Heart sounds: No murmur heard.  Pulmonary:      Effort: Pulmonary effort is normal. No respiratory distress.      Breath sounds: Normal breath sounds. No wheezing.   Abdominal:      General: Bowel sounds are normal. There is no distension.      Palpations: Abdomen is soft. There is no mass.      Tenderness: There is no abdominal tenderness. There is no guarding or rebound.   Musculoskeletal:         General: No swelling or tenderness. Normal range of motion.       Cervical back: Normal range of motion and neck supple. No tenderness.      Comments: She is ambulatory without problems. Non tender hips, legs with full range of motion noted.  Except slightly tender at right lateral lower leg with full range of motion noted.   Feet:      Right foot:      Protective Sensation: 5 sites tested.  5 sites sensed.      Skin integrity: No ulcer or skin breakdown.      Left foot:      Protective Sensation: 5 sites tested.  5 sites sensed.      Skin integrity: No ulcer or skin breakdown.   Lymphadenopathy:      Cervical: No cervical adenopathy.   Skin:     General: Skin is warm.   Neurological:      General: No focal deficit present.      Mental Status: She is alert and oriented to person, place, and time.   Psychiatric:         Mood and Affect: Mood normal.         Behavior: Behavior normal.         Thought Content: Thought content normal.         Judgment: Judgment normal.       ASSESSMENT:  1. Hip joint stiffness, bilateral    2. Bilateral hip pain    3. Pain in right lower leg        PLAN:  Ambar was seen today for leg xray.    Diagnoses and all orders for this visit:    Hip joint stiffness, bilateral  -     X-Ray Hips Bilateral 2 View Incl AP Pelvis; Future    Bilateral hip pain  -     X-Ray Hips Bilateral 2 View Incl AP Pelvis; Future    Pain in right lower leg  -     X-Ray Tibia Fibula 2 View Right; Future    Patient advised to call for results.  But, encouraged patient to follow up through with nerve conduction study and keep follow-up with specialist.  Continue current medications, follow low sodium, low cholesterol, low carb diet, daily walks.  Follow up if symptoms worsen or fail to improve.

## 2025-03-08 DIAGNOSIS — I10 ESSENTIAL HYPERTENSION: ICD-10-CM

## 2025-03-08 NOTE — TELEPHONE ENCOUNTER
No care due was identified.  [facility] Embedded Care Due Messages. Reference number: [id number].   3/08/2025 11:12:49 AM CST

## 2025-03-09 RX ORDER — AMLODIPINE BESYLATE 5 MG/1
5 TABLET ORAL
Qty: 90 TABLET | Refills: 3 | Status: SHIPPED | OUTPATIENT
Start: 2025-03-09

## 2025-03-10 ENCOUNTER — RESULTS FOLLOW-UP (OUTPATIENT)
Dept: FAMILY MEDICINE | Facility: CLINIC | Age: 75
End: 2025-03-10

## 2025-03-10 NOTE — TELEPHONE ENCOUNTER
Refill Decision Note   Ambar Duenas  is requesting a refill authorization.  Brief Assessment and Rationale for Refill:  Approve     Medication Therapy Plan:         Comments:     Note composed:8:24 PM 03/09/2025             Appointments     Last Visit   3/3/2025 Lilian Murrieta MD   Next Visit   5/12/2025 Lilian Murrieta MD

## 2025-04-16 ENCOUNTER — OFFICE VISIT (OUTPATIENT)
Dept: SURGERY | Facility: CLINIC | Age: 75
End: 2025-04-16
Payer: MEDICARE

## 2025-04-16 ENCOUNTER — LAB VISIT (OUTPATIENT)
Dept: LAB | Facility: HOSPITAL | Age: 75
End: 2025-04-16
Attending: INTERNAL MEDICINE
Payer: MEDICARE

## 2025-04-16 VITALS
OXYGEN SATURATION: 98 % | BODY MASS INDEX: 31.42 KG/M2 | HEIGHT: 70 IN | WEIGHT: 219.44 LBS | SYSTOLIC BLOOD PRESSURE: 165 MMHG | HEART RATE: 91 BPM | DIASTOLIC BLOOD PRESSURE: 93 MMHG

## 2025-04-16 DIAGNOSIS — Z85.038 PERSONAL HISTORY OF COLON CANCER: ICD-10-CM

## 2025-04-16 DIAGNOSIS — C18.9 MALIGNANT NEOPLASM OF COLON, UNSPECIFIED PART OF COLON: ICD-10-CM

## 2025-04-16 DIAGNOSIS — Z85.038 PERSONAL HISTORY OF COLON CANCER: Primary | ICD-10-CM

## 2025-04-16 LAB — CARCINOEMBRYONIC ANTIGEN (OHS): 2.2 NG/ML

## 2025-04-16 PROCEDURE — 82378 CARCINOEMBRYONIC ANTIGEN: CPT

## 2025-04-16 PROCEDURE — 36415 COLL VENOUS BLD VENIPUNCTURE: CPT

## 2025-04-16 PROCEDURE — 99999 PR PBB SHADOW E&M-EST. PATIENT-LVL III: CPT | Mod: PBBFAC,,, | Performed by: COLON & RECTAL SURGERY

## 2025-04-16 NOTE — PROGRESS NOTES
History & Physical    SUBJECTIVE:     Ref: Melanie Ballard MD    History of Present Illness:  Patient is a 74 y.o. female presents forEvaluation of colonic adenocarcinoma.  Patient was undergoing surveillance colonoscopy in 2022 where she was found to have a almost completely obstructing colon mass that was biopsied and resulted as adenocarcinoma.  This was initially thought to be at the sigmoid colon upon imaging with CT scan of the chest abdomen pelvis, no metastatic disease was found but she was found to have a large fungating mass near the splenic flexure with a normal appearing sigmoid.  She has been asymptomatic but before the colonoscopy in since that time.  She denies any fever, chills, nausea, vomiting, hematochezia, melena or abdominal pain.  She only reports a minor amount of reflux that has since resolved with the addition of pantoprazole.  Her only significant past surgical history is  section via lower midline incision.  She denies any family history of colorectal cancer or IBD.  She has had previous colonic polyps on a colonoscopy around 7 years ago.  She reports she is still having normal bowel movements.    5/10/2022: open subtotal colectomy, partial gastrectomy (final path: Stage II T4N0 adenocarcinoma    Interval history:  Since last clinic visit, the patient continues to do well.  Denies any hematochezia or melena.  Tolerating diet without nausea or vomiting.    Review of patient's allergies indicates:  No Known Allergies    Current Outpatient Medications   Medication Sig Dispense Refill    amLODIPine (NORVASC) 5 MG tablet TAKE 1 TABLET(5 MG) BY MOUTH DAILY 90 tablet 3    ezetimibe (ZETIA) 10 mg tablet Take 1 tablet (10 mg total) by mouth once daily. 30 tablet 2    montelukast (SINGULAIR) 10 mg tablet Take 1 tablet (10 mg total) by mouth every evening. 90 tablet 1    pantoprazole (PROTONIX) 40 MG tablet Take 1 tablet (40 mg total) by mouth 2 (two) times daily. (Patient taking  differently: Take 40 mg by mouth daily as needed.) 180 tablet 1     No current facility-administered medications for this visit.       Past Medical History:   Diagnosis Date    Anemia, unspecified     Fibroids     GERD (gastroesophageal reflux disease)     History of colon cancer     Hyperlipidemia     Hypertension     Postmenopausal      Past Surgical History:   Procedure Laterality Date    COLONOSCOPY N/A 3/28/2022    Procedure: COLONOSCOPY;  Surgeon: Melanie Ballard MD;  Location: [facility];  Service: Endoscopy;  Laterality: N/A;    COLONOSCOPY N/A 6/28/2023    Procedure: COLONOSCOPY;  Surgeon: Maury Liu MD;  Location: [facility];  Service: General;  Laterality: N/A;    ESOPHAGOGASTRODUODENOSCOPY N/A 3/28/2022    Procedure: ESOPHAGOGASTRODUODENOSCOPY (EGD);  Surgeon: Melanie Ballard MD;  Location: [facility];  Service: Endoscopy;  Laterality: N/A;    HEMORRHOID SURGERY      HYSTEROSCOPY WITH DILATION AND CURETTAGE OF UTERUS N/A 5/10/2022    Procedure: HYSTEROSCOPY, WITH DILATION AND CURETTAGE OF UTERUS;  Surgeon: Madelyn West MD;  Location: [facility];  Service: OB/GYN;  Laterality: N/A;    INJECTION OF ANESTHETIC AGENT INTO TISSUE PLANE DEFINED BY TRANSVERSUS ABDOMINIS MUSCLE N/A 5/10/2022    Procedure: BLOCK, TRANSVERSUS ABDOMINIS PLANE;  Surgeon: Maury Liu MD;  Location: [facility];  Service: General;  Laterality: N/A;    LIVER BIOPSY  5/10/2022    Procedure: BIOPSY, LIVER;  Surgeon: Maury Liu MD;  Location: [facility];  Service: General;;    OMENTECTOMY  5/10/2022    Procedure: OMENTECTOMY;  Surgeon: Maury Liu MD;  Location: [facility] OR;  Service: General;;  partial    PARTIAL GASTRECTOMY N/A 5/10/2022    Procedure: GASTRECTOMY, PARTIAL;  Surgeon: Maury Liu MD;  Location: [facility] OR;  Service: General;  Laterality: N/A;    SUBTOTAL COLECTOMY Left 5/10/2022    Procedure: COLECTOMY, PARTIAL;  Surgeon: Maury Liu MD;  Location: [facility] OR;  Service: General;   "Laterality: Left;  LEFT VS SUBTOTAl vs EXTENDED RIGHT     Family History   Problem Relation Name Age of Onset    No Known Problems Mother      No Known Problems Father       Social History     Tobacco Use    Smoking status: Never     Passive exposure: Never    Smokeless tobacco: Never   Substance Use Topics    Alcohol use: Yes     Alcohol/week: 2.0 standard drinks of alcohol     Types: 2 Glasses of wine per week     Comment: occ    Drug use: Never        Review of Systems:  Review of Systems   Constitutional:  Negative for activity change, appetite change, chills, fatigue, fever and unexpected weight change.   HENT:  Negative for congestion, ear pain, sore throat and trouble swallowing.    Eyes:  Negative for pain, redness and itching.   Respiratory:  Negative for cough, shortness of breath and wheezing.    Cardiovascular:  Negative for chest pain, palpitations and leg swelling.   Gastrointestinal:  Negative for abdominal distention, abdominal pain, anal bleeding, blood in stool, constipation, diarrhea, nausea, rectal pain and vomiting.   Endocrine: Negative for cold intolerance, heat intolerance and polyuria.   Genitourinary:  Negative for dysuria, flank pain, frequency and hematuria.   Musculoskeletal:  Negative for gait problem, joint swelling and neck pain.   Skin:  Negative for color change, rash and wound.   Allergic/Immunologic: Negative for environmental allergies and immunocompromised state.   Neurological:  Negative for dizziness, speech difficulty, weakness and numbness.   Psychiatric/Behavioral:  Negative for agitation, confusion and hallucinations.        OBJECTIVE:     Vital Signs (Most Recent)  Pulse: 91 (04/16/25 1404)  BP: (!) 165/93 (04/16/25 1404)  SpO2: 98 % (04/16/25 1404)  5' 10" (1.778 m)  99.6 kg (219 lb 7.5 oz)     Physical Exam:  Physical Exam  Constitutional:       Appearance: She is well-developed.   HENT:      Head: Normocephalic and atraumatic.   Eyes:      Conjunctiva/sclera: " Conjunctivae normal.   Neck:      Thyroid: No thyromegaly.   Cardiovascular:      Rate and Rhythm: Normal rate and regular rhythm.   Pulmonary:      Effort: Pulmonary effort is normal. No respiratory distress.   Abdominal:      Comments: Soft, nondistended, nontender; well-healed midline incision   Musculoskeletal:         General: No tenderness. Normal range of motion.      Cervical back: Normal range of motion.   Skin:     General: Skin is warm and dry.      Capillary Refill: Capillary refill takes less than 2 seconds.      Findings: No rash.   Neurological:      Mental Status: She is alert and oriented to person, place, and time.         Laboratory  Lab Results   Component Value Date    WBC 4.60 01/13/2025    HGB 12.7 01/13/2025    HCT 39.6 01/13/2025     01/13/2025    CHOL 252 (H) 01/13/2025    TRIG 59 01/13/2025    HDL 58 01/13/2025    ALT 12 01/13/2025    AST 17 01/13/2025     01/13/2025    K 3.8 01/13/2025     01/13/2025    CREATININE 0.7 01/13/2025    BUN 16 01/13/2025    CO2 25 01/13/2025    TSH 0.915 01/13/2025    HGBA1C 5.6 01/13/2025                 Component  Ref Range & Units (hover) 3 mo ago  (1/16/25) 6 mo ago  (10/16/24) 9 mo ago  (7/17/24) 12 mo ago  (4/17/24) 1 yr ago  (1/22/24) 1 yr ago  (10/11/23) 1 yr ago  (5/22/23)   CEA 2.3 2.5 CM 1.9 CM 2.4 CM 2.4 CM 2.8 CM 1.9 CM          Diagnostic Results:  CT: Reviewed  Colonoscopy: reviewed    CT Dec 2024:  FINDINGS:  CT Chest:     Lower neck and chest wall: No supraclavicular or axillary lymphadenopathy is seen.     Lungs and pleura: No new nodules or masses.  Otherwise grossly unchanged.  Right middle lobe granuloma.     Mediastinum and darrick: Unchanged     Heart and pericardium: Heart size is normal. No pericardial effusion.     CT abdomen and pelvis:     Liver: Normal.     Gallbladder and biliary tree: No high-density gallstones. Normal gallbladder wall thickness.No intra- or extrahepatic biliary ductal dilation.     Spleen:  Normal.     Pancreas: Normal.     Adrenals: Normal.     Kidneys and ureters: Punctate right nephrolithiasis without evidence of hydronephrosis.     Bowel: No evidence of mechanical bowel obstruction.     Vessels: Mild-to-moderate atheromatous disease.     Lymph nodes: Unchanged     Peritoneum: No ascites or free air.     Bladder: Normal.     Reproductive organs: No pelvic masses.     Abdominal wall: There are 2 containing hernia in the anterior abdominal wall near midline just below the liver edge.  Additionally, there is a bowel containing periumbilical hernia.  Contrast moves through the bowel without definitive evidence of strangulation.     Bones: Multilevel degenerative changes of the spine.  Degenerative changes of the bilateral SI joints.  Lumbar facet arthropathy.     Impression:     1. Grossly unchanged evaluation without evidence of recurrent or metastatic disease.  2. Multiple ventral hernias.  Bowel containing periumbilical hernia without definitive strangulation.    ASSESSMENT/PLAN:     75yo F with colonic adenocarcinoma now s/p subtotal colectomy with partial gastrectomy on 5/10/22 with final path showing Stage II T4bN0 adenocarcinoma with negative margins    - CEA level pending today and q3 months  - CT q6-12 months, due June 2025, ordered  - Cscope due in 2026  - RTC 6 months for surveillance    Maury Liu MD  Colon and Rectal Surgery  [facility]

## 2025-04-17 ENCOUNTER — RESULTS FOLLOW-UP (OUTPATIENT)
Dept: SURGERY | Facility: CLINIC | Age: 75
End: 2025-04-17

## 2025-04-21 ENCOUNTER — TELEPHONE (OUTPATIENT)
Dept: SPORTS MEDICINE | Facility: CLINIC | Age: 75
End: 2025-04-21
Payer: MEDICARE

## 2025-04-21 NOTE — TELEPHONE ENCOUNTER
----- Message from [employer] sent at 4/21/2025  9:29 AM CDT -----  Contact: Ambar  .TYPE: Patient Call BackWho called:patientWhat is the request in detail:  patient request for call back from Nurse.  Can the clinic reply by [facility]?   Would the patient rather a call back or a response via [facility] call back number:.[phone]

## 2025-04-21 NOTE — TELEPHONE ENCOUNTER
Pt called to schedule hip injection. Per last note, pt needs EMG prior to consideration of hip or knee injection. Pt refused EMG. Informed her that Dr Darling would not be moving froward with any injection prior to an EMG based on her hip tightness symptoms and low back DDD changes on xrays. She stated she did not have pain but rather stiffness at her hip. Discussed with her that an injection would not help stiffness. Offered her PT but pt refused

## 2025-05-12 ENCOUNTER — TELEPHONE (OUTPATIENT)
Dept: FAMILY MEDICINE | Facility: CLINIC | Age: 75
End: 2025-05-12
Payer: MEDICARE

## 2025-05-12 NOTE — TELEPHONE ENCOUNTER
Pt call returned. Pt advised that missed call was to reschedule Cholesterol f/u with Dr. Murrieta. Pt states she will attend 7/14 f/u appt (previously scheduled).

## 2025-05-12 NOTE — TELEPHONE ENCOUNTER
----- Message from [employer] sent at 5/12/2025 12:32 PM CDT -----  Contact: self  Type:  Sooner Apoointment RequestCaller is requesting a sooner appointment.  Caller declined first available appointment listed below.  Caller will not accept being placed on the waitlist and is requesting a message be sent to doctor.Name of Caller:Ambar Nieves is the first available appointment? Today 2:40Symptoms:annual, chWould the patient rather a call back or a response via [employer]? Call [facility] Call Back Number:[phone] Information: She was out of town today and will be back later tonight.

## 2025-05-12 NOTE — TELEPHONE ENCOUNTER
Left message to call clinic back.  Edith Starks sent to OLE[facility] Bullard Staff  Caller: self (Today, 12:32 PM)  Type:  Sooner Apoointment Request    Caller is requesting a sooner appointment.  Caller declined first available appointment listed below.  Caller will not accept being placed on the waitlist and is requesting a message be sent to doctor.  Name of Caller:Ambar Duenas  When is the first available appointment? Today 2:40  Symptoms:annual, ch  Would the patient rather a call back or a response [facility] Call back  Best Call Back Number:[phone]  Additional Information: She was out of town today and will be back later tonight.

## 2025-06-10 ENCOUNTER — OFFICE VISIT (OUTPATIENT)
Dept: SPORTS MEDICINE | Facility: CLINIC | Age: 75
End: 2025-06-10
Payer: MEDICARE

## 2025-06-10 DIAGNOSIS — M54.16 BILATERAL LUMBAR RADICULOPATHY: Primary | ICD-10-CM

## 2025-06-10 DIAGNOSIS — Z85.038 PERSONAL HISTORY OF COLON CANCER: Primary | ICD-10-CM

## 2025-06-10 PROCEDURE — 99213 OFFICE O/P EST LOW 20 MIN: CPT | Mod: S$GLB,,, | Performed by: STUDENT IN AN ORGANIZED HEALTH CARE EDUCATION/TRAINING PROGRAM

## 2025-06-10 PROCEDURE — 1159F MED LIST DOCD IN RCRD: CPT | Mod: CPTII,S$GLB,, | Performed by: STUDENT IN AN ORGANIZED HEALTH CARE EDUCATION/TRAINING PROGRAM

## 2025-06-10 PROCEDURE — 3288F FALL RISK ASSESSMENT DOCD: CPT | Mod: CPTII,S$GLB,, | Performed by: STUDENT IN AN ORGANIZED HEALTH CARE EDUCATION/TRAINING PROGRAM

## 2025-06-10 PROCEDURE — 3044F HG A1C LEVEL LT 7.0%: CPT | Mod: CPTII,S$GLB,, | Performed by: STUDENT IN AN ORGANIZED HEALTH CARE EDUCATION/TRAINING PROGRAM

## 2025-06-10 PROCEDURE — 1126F AMNT PAIN NOTED NONE PRSNT: CPT | Mod: CPTII,S$GLB,, | Performed by: STUDENT IN AN ORGANIZED HEALTH CARE EDUCATION/TRAINING PROGRAM

## 2025-06-10 PROCEDURE — 99999 PR PBB SHADOW E&M-EST. PATIENT-LVL II: CPT | Mod: PBBFAC,,, | Performed by: STUDENT IN AN ORGANIZED HEALTH CARE EDUCATION/TRAINING PROGRAM

## 2025-06-10 PROCEDURE — 1101F PT FALLS ASSESS-DOCD LE1/YR: CPT | Mod: CPTII,S$GLB,, | Performed by: STUDENT IN AN ORGANIZED HEALTH CARE EDUCATION/TRAINING PROGRAM

## 2025-06-10 NOTE — PROGRESS NOTES
Patient ID: Ambar Duenas  YOB: 1950  MRN: [id number]    Chief Complaint: Follow-up of the Left Hip and Follow-up of the Right Hip      History of Present Illness: Ambar Duenas is a 74-year-old female following up today for tightness bilateral anterior thighs mostly with sit-to-stand.  Last visit in February we discussed following up with an EMG but was unable to complete in the interim follow-up.  No new injuries falls traumas.  Symptoms same as last visit.    Past Medical History:   Past Medical History:   Diagnosis Date    Anemia, unspecified     Fibroids     GERD (gastroesophageal reflux disease)     History of colon cancer     Hyperlipidemia     Hypertension     Postmenopausal      Past Surgical History:   Procedure Laterality Date    COLONOSCOPY N/A 3/28/2022    Procedure: COLONOSCOPY;  Surgeon: Melanie Ballard MD;  Location: [facility];  Service: Endoscopy;  Laterality: N/A;    COLONOSCOPY N/A 6/28/2023    Procedure: COLONOSCOPY;  Surgeon: Maury Liu MD;  Location: [facility];  Service: General;  Laterality: N/A;    ESOPHAGOGASTRODUODENOSCOPY N/A 3/28/2022    Procedure: ESOPHAGOGASTRODUODENOSCOPY (EGD);  Surgeon: Melanie Ballard MD;  Location: [facility];  Service: Endoscopy;  Laterality: N/A;    HEMORRHOID SURGERY      HYSTEROSCOPY WITH DILATION AND CURETTAGE OF UTERUS N/A 5/10/2022    Procedure: HYSTEROSCOPY, WITH DILATION AND CURETTAGE OF UTERUS;  Surgeon: Madelyn West MD;  Location: [facility];  Service: OB/GYN;  Laterality: N/A;    INJECTION OF ANESTHETIC AGENT INTO TISSUE PLANE DEFINED BY TRANSVERSUS ABDOMINIS MUSCLE N/A 5/10/2022    Procedure: BLOCK, TRANSVERSUS ABDOMINIS PLANE;  Surgeon: Maury Liu MD;  Location: [facility] OR;  Service: General;  Laterality: N/A;    LIVER BIOPSY  5/10/2022    Procedure: BIOPSY, LIVER;  Surgeon: Maury Liu MD;  Location: [facility] OR;  Service: General;;    OMENTECTOMY  5/10/2022    Procedure: OMENTECTOMY;  Surgeon:  Maury Liu MD;  Location: [facility] OR;  Service: General;;  partial    PARTIAL GASTRECTOMY N/A 5/10/2022    Procedure: GASTRECTOMY, PARTIAL;  Surgeon: Maury Liu MD;  Location: [facility] OR;  Service: General;  Laterality: N/A;    SUBTOTAL COLECTOMY Left 5/10/2022    Procedure: COLECTOMY, PARTIAL;  Surgeon: Maury Liu MD;  Location: [facility] OR;  Service: General;  Laterality: Left;  LEFT VS SUBTOTAl vs EXTENDED RIGHT     Family History   Problem Relation Name Age of Onset    No Known Problems Mother      No Known Problems Father       Social History[1]  Medication List with Changes/Refills   Current Medications    AMLODIPINE (NORVASC) 5 MG TABLET    TAKE 1 TABLET(5 MG) BY MOUTH DAILY    EZETIMIBE (ZETIA) 10 MG TABLET    Take 1 tablet (10 mg total) by mouth once daily.    MONTELUKAST (SINGULAIR) 10 MG TABLET    Take 1 tablet (10 mg total) by mouth every evening.    PANTOPRAZOLE (PROTONIX) 40 MG TABLET    Take 1 tablet (40 mg total) by mouth 2 (two) times daily.     Review of patient's allergies indicates:  No Known Allergies    Physical Exam:   There is no height or weight on file to calculate BMI.    GENERAL: Well appearing, in no acute distress.  HEAD: Normocephalic and atraumatic.  ENT: External ears and nose grossly normal.  EYES: EOMI bilaterally  PULMONARY: Respirations are grossly even and non-labored.  NEURO: Awake, alert, and oriented x 3.  SKIN: No obvious rashes appreciated.  PSYCH: Mood & affect are appropriate.    Detailed MSK exam:     Deferred    Imaging:  X-Ray Hips Bilateral 2 View Incl AP Pelvis  Narrative: EXAM: XR HIPS BILATERAL 2 VIEW INCL AP PELVIS    CLINICAL HISTORY:  Bilateral hip pain    FINDINGS:   No fracture, suspicious bone marrow lesion or other acute disease is seen in the pelvis or either hip.  Joint alignment is anatomic.  There is no radiographic evidence of femoral head osteonecrosis.  Mild degenerative changes bilateral superior acetabulum.  Impression:   No acute  abnormality identified.    Finalized on: 3/3/2025 6:09 PM By:  Alivn Contreras MD  [facility]# [id number]      2025-03-03 18:11:39.247     [facility]  X-Ray Tibia Fibula 2 View Right  Narrative: EXAM: XR TIBIA FIBULA 2 VIEW RIGHT    CLINICAL HISTORY: Right foreleg pain.    FINDINGS: Mild degenerative changes of the knee and ankle joint.  No fracture or other acute abnormality is identified.  Joint alignment is anatomic.  Impression:  No acute radiographic abnormality of the right tib-fib.    Finalized on: 3/3/2025 5:08 PM By:  Alvin Contreras MD  [facility]# [id number]      2025-03-03 17:10:15.825     [facility]      Relevant imaging results were reviewed and interpreted by me and per my read as above.  This was discussed with the patient and / or family today.     Assessment:  Ambar Duenas is a 74 y.o. female presents today for continued symptoms of tightness bilateral lower legs mostly sit to stand with increased activity as well.  Discussed need to move forward with an EMG as previously discussed she is interested.  We will reach out to PM&R and have him schedule her EMG bilateral lower extremity.  Follow-up after that is completed.    Bilateral lumbar radiculopathy           Mumtaz Darling MD    Disclaimer: This note was prepared using a voice recognition system and is likely to have sound alike errors within the text.          [1]   Social History  Socioeconomic History    Marital status:    Tobacco Use    Smoking status: Never     Passive exposure: Never    Smokeless tobacco: Never   Substance and Sexual Activity    Alcohol use: Yes     Alcohol/week: 2.0 standard drinks of alcohol     Types: 2 Glasses of wine per week     Comment: occ    Drug use: Never    Sexual activity: Not Currently     Partners: Male     Birth control/protection: Post-menopausal   Social History Narrative    She wears seatbelt.     Social Drivers of Health     Physical Activity: Insufficiently Active (1/13/2025)    Exercise Vital Sign     Days of  Exercise per Week: 2 days     Minutes of Exercise per Session: 60 min

## 2025-06-11 ENCOUNTER — TELEPHONE (OUTPATIENT)
Dept: PHYSICAL MEDICINE AND REHAB | Facility: CLINIC | Age: 75
End: 2025-06-11
Payer: MEDICARE

## 2025-06-12 ENCOUNTER — HOSPITAL ENCOUNTER (OUTPATIENT)
Dept: RADIOLOGY | Facility: HOSPITAL | Age: 75
Discharge: HOME | End: 2025-06-12
Attending: COLON & RECTAL SURGERY
Payer: MEDICARE

## 2025-06-12 DIAGNOSIS — Z85.038 PERSONAL HISTORY OF COLON CANCER: ICD-10-CM

## 2025-06-12 DIAGNOSIS — C18.9 MALIGNANT NEOPLASM OF COLON, UNSPECIFIED PART OF COLON: ICD-10-CM

## 2025-06-12 PROCEDURE — A9698 NON-RAD CONTRAST MATERIALNOC: HCPCS | Performed by: COLON & RECTAL SURGERY

## 2025-06-12 PROCEDURE — 74177 CT ABD & PELVIS W/CONTRAST: CPT | Mod: TC

## 2025-06-12 PROCEDURE — 25500020 PHARM REV CODE 255: Performed by: COLON & RECTAL SURGERY

## 2025-06-12 RX ADMIN — IOHEXOL 100 ML: 350 INJECTION, SOLUTION INTRAVENOUS at 10:06

## 2025-06-12 RX ADMIN — IOHEXOL 1000 ML: 12 SOLUTION ORAL at 09:06

## 2025-06-17 ENCOUNTER — OFFICE VISIT (OUTPATIENT)
Dept: PHYSICAL MEDICINE AND REHAB | Facility: CLINIC | Age: 75
End: 2025-06-17
Payer: MEDICARE

## 2025-06-17 VITALS — HEIGHT: 70 IN | BODY MASS INDEX: 31.43 KG/M2 | RESPIRATION RATE: 13 BRPM | WEIGHT: 219.56 LBS

## 2025-06-17 DIAGNOSIS — M54.16 BILATERAL LUMBAR RADICULOPATHY: ICD-10-CM

## 2025-06-17 PROCEDURE — 99999 PR PBB SHADOW E&M-EST. PATIENT-LVL III: CPT | Mod: PBBFAC,,, | Performed by: PHYSICAL MEDICINE & REHABILITATION

## 2025-06-17 NOTE — PROGRESS NOTES
[facility]  Department of Physiatry-EMG  [facility]   [address] [place] [address]               Full Name: Ambar Duenas YOB: 1950  Patient ID: [id number]      Visit Date: 6/17/2025 2:02 PM  Age: 74 Years  Examining Physician: Anitra Wood M.D.  Referring Physician: Dr Darling  Patient History: lower ext      Chief Complaint   Patient presents with    Back Pain     Into leg       HPI: This is a 74 y.o.  female being seen in clinic today for evaluation of chronic low back achy pain with tightness and pain radiating into her legs-worse on the left. Her symptoms are worse with sit to stand     History obtained from patient    Past family, medical, social, and surgical history reviewed in chart    Review of Systems:     General- denies lethargy, weight change, fever, chills  Head/neck- denies swallowing difficulties  ENT- denies hearing changes  Cardiovascular-denies chest pain  Pulmonary- denies shortness of breath  GI- denies constipation or bowel incontinence  - denies bladder incontinence  Skin- denies wounds or rashes  Musculoskeletal- denies weakness, + pain  Neurologic- denies numbness and tingling  Psychiatric- denies depressive or psychotic features, denies anxiety  Lymphatic-denies swelling  Endocrine- denies hypoglycemic symptoms/DM history  All other pertinent systems negative     Physical Examination:  General: Well developed, well nourished female, NAD  HEENT:NCAT EOMI bilaterally   Pulmonary:Normal respirations    Spinal Examination: CERVICAL  Active ROM is within normal limits.  Inspection: No deformity of spinal alignment.    Spinal Examination: LUMBAR or THORACIC  Active ROM is within normal limits.  Inspection: No deformity of spinal alignment.    Slr neg bilaterally    Bilateral Upper and Lower Extremities:  Pulses are 2+ at radial bilaterally.  Shoulder/Elbow/Wrist/Hand ROM   Hip/Knee/Ankle ROM wnl  Bilateral Extremities show  normal capillary refill.  No signs of cyanosis, rubor, edema, skin changes, or dysvascular changes of appendages.  Nails appear intact.    Neurological Exam:  Cranial Nerves:  II-XII grossly intact    Manual Muscle Testing: (Motor 5=normal)  5/5 strength bilateral lower extremities    No focal atrophy is noted of either upper or lower extremity.    Bilateral Reflexes:  Louise's response is absent bilaterally.  No clonus at knee or ankle.    Sensation: tested to light touch  - intact in legs    Gait: Narrow base and good arm swing.      Entire procedure explained to patient prior to proceeding.  Verbal consent obtained      Sensory NCS      Nerve / Sites Rec. Site Onset Lat Peak Lat Amp Distance Onset Dif Peak Diff Onset Alfred     ms ms µV mm ms ms m/s   L Sural - (Antidromic)      Calf Ankle 3.6 4.1 9.0 140 3.6 4.1 39.0   L Superficial peroneal - (Antidromic)      Lat leg Ankle 2.1 2.8 11.6 140 2.1 2.8 67.9       Motor NCS      Nerve / Sites Muscle Latency Amplitude Segments Dist. Lat Diff Velocity     ms mV  mm ms m/s   L Peroneal - EDB      Ankle EDB 4.40 5.8 Ankle - EDB 80        B. Fib Head EDB 10.60 5.3 B. Fib Head - Ankle 320 6.21 51.5      A. Fib Head EDB 11.90 5.2 A. Fib Head - B. Fib Head 70 1.29 54.2   L Tibial - AH      Ankle AH 4.73 4.0 Ankle - AH 80        Knee AH 13.88 2.9 Knee - Ankle 410 9.15 44.8       Needle EMG      EMG Summary Table    Spontaneous MUP Recruitment   Muscle IA Fib PSW Fasc H.F. Amp Dur. PPP Pattern   L. Rectus femoris N None None None None N N N N   L. Extensor digitorum brevis 1+ 1+ 1+ None None N N 1+ 1+   L. Abductor hallucis N None None None None N N N 1+   R. Extensor digitorum brevis N None None None None N N 1+ 1+   R. Abductor hallucis 1+ 1+ 1+ None None N N 1+ Reduced                               INTERPRETATION  -Left superficial peroneal sensory nerve conduction study showed normal peak latency and amplitude  -Left sural sensory nerve conduction study showed normal peak  latency and amplitude  -Left peroneal motor nerve conduction study showed normal latency, amplitude, and conduction velocity  -Left tibial motor nerve conduction study showed normal latency, amplitude, and conduction velocity  -Needle EMG examination performed to above mentioned muscles       IMPRESSION  ABNORMAL study  2.  There is electrodiagnostic evidence of an acute on chronic radiculopathy of the left L5 and right S1 nerve roots and a chronic radiculopathy of the left S1 and right L5 nerve roots    PLAN  Discussed in detail for greater than 30 minutes about diagnosis and treatment plan    1. Follow up with referring provider: Dr. Mumtaz Darling  2. Handouts on lumbar radic, back care provided. Rec referral to PT  3. This study is good for one year. If symptoms worsen or do not improve, please re-consult.    Anitra Wood M.D.  Physical Medicine and Rehab

## 2025-06-20 ENCOUNTER — TELEPHONE (OUTPATIENT)
Dept: ADMINISTRATIVE | Facility: OTHER | Age: 75
End: 2025-06-20
Payer: MEDICARE

## 2025-06-20 ENCOUNTER — PATIENT MESSAGE (OUTPATIENT)
Dept: SPORTS MEDICINE | Facility: CLINIC | Age: 75
End: 2025-06-20
Payer: MEDICARE

## 2025-06-20 DIAGNOSIS — M54.16 LUMBAR RADICULOPATHY: Primary | ICD-10-CM

## 2025-06-23 ENCOUNTER — TELEPHONE (OUTPATIENT)
Dept: PAIN MEDICINE | Facility: CLINIC | Age: 75
End: 2025-06-23
Payer: MEDICARE

## 2025-06-23 NOTE — TELEPHONE ENCOUNTER
Called patient for scheduling of B&S referral. Patient declined appt at this time. Canceled appt in WQ.  RD

## 2025-06-25 ENCOUNTER — RESULTS FOLLOW-UP (OUTPATIENT)
Dept: SURGERY | Facility: HOSPITAL | Age: 75
End: 2025-06-25

## 2025-06-27 ENCOUNTER — TELEPHONE (OUTPATIENT)
Dept: PAIN MEDICINE | Facility: CLINIC | Age: 75
End: 2025-06-27
Payer: MEDICARE

## 2025-06-27 NOTE — TELEPHONE ENCOUNTER
Called patient, no answer. LVM with detailed message and my contact information for her to call back if desired.  Diana Neves, RN    Copied from CRM #[id number]. Topic: General Inquiry - Return Call  >> Jun 27, 2025  8:09 AM Miriam wrote:  .Type:  Patient Requesting Call    Who Called:Ambar  Does the patient know what this is regarding?:pt called to get a nurse to reach back out to her in regards to scheduling an appointment from a referral that was sent over from Dr. Darling  Would the patient rather a call back or a response via [facility]? Call back  Best Call Back Number:.[phone]   Additional Information: pt stated that she been calling to try to schedule the appointment twice

## 2025-06-30 ENCOUNTER — TELEPHONE (OUTPATIENT)
Dept: PAIN MEDICINE | Facility: CLINIC | Age: 75
End: 2025-06-30
Payer: MEDICARE

## 2025-06-30 NOTE — TELEPHONE ENCOUNTER
Copied from CRM #[id number]. Topic: General Inquiry - Patient Advice  >> Jun 30, 2025  3:57 PM Alicia wrote:  Type:  Patient Returning Call    Who Called:HERNAN  Who Left Message for Patient:Diana Neves RN  Does the patient know what this is regarding? PATIENT NEED TO SEE PAIN MANAGEMENT DUE TO HER HIP AND LOWER BODY.  Would the patient rather a call back or a response via [facility]? PLEASE CALL BACK  Best Call Back Number:[phone]  Additional Information:

## 2025-06-30 NOTE — TELEPHONE ENCOUNTER
Patient called and confirmed time and location for appointment. Patient given instructions about how our Interventional Pain Department practices. Patient advised to bring any prior images to the appointment.

## 2025-07-14 ENCOUNTER — OFFICE VISIT (OUTPATIENT)
Dept: FAMILY MEDICINE | Facility: CLINIC | Age: 75
End: 2025-07-14
Attending: FAMILY MEDICINE
Payer: MEDICARE

## 2025-07-14 ENCOUNTER — LAB VISIT (OUTPATIENT)
Dept: LAB | Facility: HOSPITAL | Age: 75
End: 2025-07-14
Attending: FAMILY MEDICINE
Payer: MEDICARE

## 2025-07-14 VITALS
WEIGHT: 218.69 LBS | HEIGHT: 70 IN | RESPIRATION RATE: 18 BRPM | DIASTOLIC BLOOD PRESSURE: 80 MMHG | OXYGEN SATURATION: 97 % | TEMPERATURE: 99 F | BODY MASS INDEX: 31.31 KG/M2 | HEART RATE: 102 BPM | SYSTOLIC BLOOD PRESSURE: 136 MMHG

## 2025-07-14 DIAGNOSIS — R73.03 PREDIABETES: ICD-10-CM

## 2025-07-14 DIAGNOSIS — I10 ESSENTIAL HYPERTENSION: Primary | ICD-10-CM

## 2025-07-14 DIAGNOSIS — E66.811 OBESITY (BMI 30.0-34.9): ICD-10-CM

## 2025-07-14 DIAGNOSIS — E78.5 HYPERLIPIDEMIA, UNSPECIFIED HYPERLIPIDEMIA TYPE: ICD-10-CM

## 2025-07-14 LAB
CHOLEST SERPL-MCNC: 252 MG/DL (ref 120–199)
CHOLEST/HDLC SERPL: 3.8 {RATIO} (ref 2–5)
HDLC SERPL-MCNC: 67 MG/DL (ref 40–75)
HDLC SERPL: 26.6 % (ref 20–50)
LDLC SERPL CALC-MCNC: 173.2 MG/DL (ref 63–159)
NONHDLC SERPL-MCNC: 185 MG/DL
TRIGL SERPL-MCNC: 59 MG/DL (ref 30–150)

## 2025-07-14 PROCEDURE — 83036 HEMOGLOBIN GLYCOSYLATED A1C: CPT

## 2025-07-14 PROCEDURE — 99214 OFFICE O/P EST MOD 30 MIN: CPT | Mod: S$GLB,,, | Performed by: FAMILY MEDICINE

## 2025-07-14 PROCEDURE — 1126F AMNT PAIN NOTED NONE PRSNT: CPT | Mod: CPTII,S$GLB,, | Performed by: FAMILY MEDICINE

## 2025-07-14 PROCEDURE — 80061 LIPID PANEL: CPT

## 2025-07-14 PROCEDURE — 3044F HG A1C LEVEL LT 7.0%: CPT | Mod: CPTII,S$GLB,, | Performed by: FAMILY MEDICINE

## 2025-07-14 PROCEDURE — 1101F PT FALLS ASSESS-DOCD LE1/YR: CPT | Mod: CPTII,S$GLB,, | Performed by: FAMILY MEDICINE

## 2025-07-14 PROCEDURE — 3288F FALL RISK ASSESSMENT DOCD: CPT | Mod: CPTII,S$GLB,, | Performed by: FAMILY MEDICINE

## 2025-07-14 PROCEDURE — 3008F BODY MASS INDEX DOCD: CPT | Mod: CPTII,S$GLB,, | Performed by: FAMILY MEDICINE

## 2025-07-14 PROCEDURE — 3079F DIAST BP 80-89 MM HG: CPT | Mod: CPTII,S$GLB,, | Performed by: FAMILY MEDICINE

## 2025-07-14 PROCEDURE — 1159F MED LIST DOCD IN RCRD: CPT | Mod: CPTII,S$GLB,, | Performed by: FAMILY MEDICINE

## 2025-07-14 PROCEDURE — 1160F RVW MEDS BY RX/DR IN RCRD: CPT | Mod: CPTII,S$GLB,, | Performed by: FAMILY MEDICINE

## 2025-07-14 PROCEDURE — 36415 COLL VENOUS BLD VENIPUNCTURE: CPT | Mod: PO

## 2025-07-14 PROCEDURE — G2211 COMPLEX E/M VISIT ADD ON: HCPCS | Mod: S$GLB,,, | Performed by: FAMILY MEDICINE

## 2025-07-14 PROCEDURE — 99999 PR PBB SHADOW E&M-EST. PATIENT-LVL IV: CPT | Mod: PBBFAC,,, | Performed by: FAMILY MEDICINE

## 2025-07-14 PROCEDURE — 3075F SYST BP GE 130 - 139MM HG: CPT | Mod: CPTII,S$GLB,, | Performed by: FAMILY MEDICINE

## 2025-07-14 NOTE — PROGRESS NOTES
Ambar Duenas    Chief Complaint   Patient presents with    Hypertension    Pre-diabetes    Follow-up       History of Present Illness:   Ms. Duenas comes in today for hypertension and prediabetes follow-up.  She states she is fasting and has taken medication today.  She states she continues to take amlodipine 5 mg daily for blood pressure control.  She states she controls prediabetes with lifestyle changes.  She states she exercises 5 times a week, 60 minutes each time with walking 2 miles.  She states she monitors her diet.  She states she did not start taking Zetia 10 mg daily as prescribed on February 12, 2025 for I cholesterol.  Instead, she states she takes Durable herb supplement to help lower her cholesterol.    States she performs home blood pressure checks every other day with levels ranging 122-132/70-80 (122/78 this morning).    She states she continues to have stiffness or back upon getting up states stiffness resolves as she it is around.  She denies having pain to she states she will start physical therapy for low back pain in August 2025. She saw Dr. Wood, physiatrist, on June 17, 2025 for bilateral lumbar radiculopathy.     She saw Dr. Liu, colorectal surgeon, on April 16, 2025 for personal history of colon cancer, malignant neoplasm of colon, unspecified part of colon    Otherwise, she denies having fever, chills, fatigue, appetite changes; shortness of breath, cough, wheezing; chest pain, palpitations, leg swelling; abdominal pain, nausea, vomiting, diarrhea, constipation; unusual urinary symptoms; polydipsia, polyphagia, polyuria, hot or cold intolerance; acute visual changes, numbness, headache; anxiety, depression, homicidal or suicidal thoughts.       Labs:                     WBC                      4.60                01/13/2025                 HGB                      12.7                01/13/2025                 HCT                      39.6                01/13/2025                  PLT                      195                 01/13/2025                 CHOL                     252 (H)             01/13/2025                 TRIG                     59                  01/13/2025                 HDL                      58                  01/13/2025                 ALT                      12                  01/13/2025                 AST                      17                  01/13/2025                 NA                       139                 01/13/2025                 K                        3.8                 01/13/2025                 CL                       105                 01/13/2025                 CREATININE               0.8                 06/12/2025                 BUN                      16                  01/13/2025                 CO2                      25                  01/13/2025                 TSH                      0.915               01/13/2025                 HGBA1C                   5.6                 01/13/2025                CALCIUM                  9.2                 01/13/2025                 ANIONGAP                 9                   01/13/2025                 EGFRNORACEVR             >60                 06/12/2025                 Current Outpatient Medications   Medication Sig    amLODIPine (NORVASC) 5 MG tablet TAKE 1 TABLET(5 MG) BY MOUTH DAILY    montelukast (SINGULAIR) 10 mg tablet Take 1 tablet (10 mg total) by mouth every evening.    pantoprazole (PROTONIX) 40 MG tablet Take 1 tablet (40 mg total) by mouth 2 (two) times daily. (Patient taking differently: Take 40 mg by mouth daily as needed.)    ezetimibe (ZETIA) 10 mg tablet Take 1 tablet (10 mg total) by mouth once daily. (Patient not taking: Reported on 7/14/2025)       Review of Systems   Constitutional:  Negative for activity change, appetite change, chills, fatigue and fever.        Wt 100.6 kg (221 lb 12.5 oz) at March 3, 2025 visit.   Eyes:  Negative for visual  disturbance.   Respiratory:  Negative for cough, shortness of breath and wheezing.    Cardiovascular:  Negative for chest pain, palpitations and leg swelling.        See history of present illness.   Gastrointestinal:  Negative for abdominal pain, constipation, diarrhea, nausea and vomiting.        See history of present illness.   Endocrine: Negative for cold intolerance, heat intolerance, polydipsia, polyphagia and polyuria.        See history of present illness.   Genitourinary:  Negative for difficulty urinating.   Musculoskeletal:  Positive for back pain. Negative for arthralgias and myalgias.        See history of present illness.   Neurological:  Negative for numbness and headaches.   Psychiatric/Behavioral:  Negative for dysphoric mood and suicidal ideas. The patient is not nervous/anxious.         Negative for homicidal ideas.       Objective:  Physical Exam  Vitals reviewed.   Constitutional:       General: She is not in acute distress.     Appearance: Normal appearance. She is obese. She is not ill-appearing, toxic-appearing or diaphoretic.      Comments: Pleasant.   Cardiovascular:      Rate and Rhythm: Normal rate and regular rhythm.      Pulses:           Dorsalis pedis pulses are 3+ on the right side and 3+ on the left side.      Heart sounds: No murmur heard.  Pulmonary:      Effort: Pulmonary effort is normal. No respiratory distress.      Breath sounds: Normal breath sounds. No wheezing.   Abdominal:      General: Bowel sounds are normal. There is no distension.      Palpations: Abdomen is soft. There is no mass.      Tenderness: There is no abdominal tenderness. There is no guarding or rebound.   Musculoskeletal:         General: No swelling or tenderness. Normal range of motion.      Cervical back: Normal range of motion and neck supple. No tenderness.      Comments: She is ambulatory without problems.    Feet:      Right foot:      Protective Sensation: 5 sites tested.  5 sites sensed.      Skin  integrity: No ulcer or skin breakdown.      Left foot:      Protective Sensation: 5 sites tested.  5 sites sensed.      Skin integrity: No ulcer or skin breakdown.   Lymphadenopathy:      Cervical: No cervical adenopathy.   Skin:     General: Skin is warm.   Neurological:      General: No focal deficit present.      Mental Status: She is alert and oriented to person, place, and time.   Psychiatric:         Mood and Affect: Mood normal.         Behavior: Behavior normal.         Thought Content: Thought content normal.         Judgment: Judgment normal.         ASSESSMENT:  1. Essential hypertension    2. Prediabetes    3. Hyperlipidemia, unspecified hyperlipidemia type    4. Obesity (BMI 30.0-34.9)        PLAN:  Ambar was seen today for hypertension, pre-diabetes and follow-up.    Diagnoses and all orders for this visit:    Essential hypertension - stable on medication    Prediabetes - stable with diet, exercise  -     Hemoglobin A1C; Future    Hyperlipidemia, unspecified hyperlipidemia type - on herbal supplement  -     Lipid Panel; Future    Obesity (BMI 30.0-34.9) - managed with diet, exercise      Patient advised to call for results.  Continue current medications, follow low sodium, low cholesterol, low carb diet, daily walks.  Keep follow up with specialists.  Flu shot this fall.  Follow up in about 6 months (around 1/13/2026) for physical.

## 2025-07-15 LAB
EAG (OHS): 123 MG/DL (ref 68–131)
HBA1C MFR BLD: 5.9 % (ref 4–5.6)

## 2025-07-16 ENCOUNTER — LAB VISIT (OUTPATIENT)
Dept: LAB | Facility: HOSPITAL | Age: 75
End: 2025-07-16
Attending: COLON & RECTAL SURGERY
Payer: MEDICARE

## 2025-07-16 DIAGNOSIS — Z85.038 PERSONAL HISTORY OF COLON CANCER: ICD-10-CM

## 2025-07-16 DIAGNOSIS — C18.9 MALIGNANT NEOPLASM OF COLON, UNSPECIFIED PART OF COLON: ICD-10-CM

## 2025-07-16 PROCEDURE — 82378 CARCINOEMBRYONIC ANTIGEN: CPT

## 2025-07-16 PROCEDURE — 36415 COLL VENOUS BLD VENIPUNCTURE: CPT

## 2025-07-17 LAB — CARCINOEMBRYONIC ANTIGEN (OHS): 2.4 NG/ML

## (undated) DEVICE — SEE MEDLINE ITEM 157117

## (undated) DEVICE — GLOVE SURG BIOGEL LATEX SZ 7.5

## (undated) DEVICE — APPLICATOR CHLORAPREP ORN 26ML

## (undated) DEVICE — CONTAINER SPECIMEN OR STER 4OZ

## (undated) DEVICE — ELECTRODE REM PLYHSV RETURN 9

## (undated) DEVICE — SET CYSTO IRRIGATION UNIV SPIK

## (undated) DEVICE — SOL NS 1000CC

## (undated) DEVICE — SUT 2/0 30IN SILK BLK BRAI

## (undated) DEVICE — NDL SAFETY 22G X 1.5 ECLIPSE

## (undated) DEVICE — Device

## (undated) DEVICE — STAPLER INT PROX TX 60X3.5MM

## (undated) DEVICE — EVACUATOR WOUND BULB 100CC

## (undated) DEVICE — PACK DRAPE PERI/GYN TIBURON

## (undated) DEVICE — SEE MEDLINE ITEM 157181

## (undated) DEVICE — TOWEL OR DISP STRL BLUE 4/PK

## (undated) DEVICE — COVER LIGHT HANDLE 80/CA

## (undated) DEVICE — CUTTER PROXIMATE BLUE 75MM

## (undated) DEVICE — RELOAD PROXIMATE GREEN 75MM

## (undated) DEVICE — DRESSING GAUZE XEROFORM 5X9

## (undated) DEVICE — SPONGE LAP 18X18 PREWASHED

## (undated) DEVICE — SEE MEDLINE ITEM 157027

## (undated) DEVICE — RELOAD PROXIMATE CUT BLUE 75MM

## (undated) DEVICE — SUT VICRYL PLUS 3-0 SH 18IN

## (undated) DEVICE — SEE MEDLINE ITEM 154981

## (undated) DEVICE — MANIFOLD 4 PORT

## (undated) DEVICE — SUT SILK 2-0 STRANDS 30IN

## (undated) DEVICE — SUT SILK 0 SH 30IN BLK BR

## (undated) DEVICE — UNDERGLOVES BIOGEL PI SIZE 7.5

## (undated) DEVICE — DRESSING TELFA N ADH 3X8

## (undated) DEVICE — SUT 1 48IN PDS II VIO MONO

## (undated) DEVICE — STAPLER SKIN PROXIMATE WIDE

## (undated) DEVICE — JELLY SURGILUBE LUBE PKT 3GM

## (undated) DEVICE — SEAL LENS SCOPE MYOSURE

## (undated) DEVICE — SUT SILK 0 SUTUPAK SA86H

## (undated) DEVICE — DEVICE ENSEAL X1 LARGE JAW

## (undated) DEVICE — GAUZE SPONGE 4X4 12PLY

## (undated) DEVICE — SUT SILK 2-0 SH 18IN BLACK

## (undated) DEVICE — ELECTRODE BLD EXT 6.50 ST DISP

## (undated) DEVICE — GLOVE SURGICAL LATEX SZ 7

## (undated) DEVICE — UNDERGLOVES BIOGEL PI SZ 7 LF

## (undated) DEVICE — SUT VICRYL CTD 2-0 GI 27 SH

## (undated) DEVICE — CATH URETHRAL RED RUBBER 18FR

## (undated) DEVICE — SEE MEDLINE ITEM 157148

## (undated) DEVICE — SOL IRR NACL .9% 3000ML

## (undated) DEVICE — TRAY CATH FOL SIL URIMTR 16FR

## (undated) DEVICE — TAPE SURG MEDIPORE 6X72IN